# Patient Record
Sex: FEMALE | Race: WHITE | NOT HISPANIC OR LATINO | Employment: FULL TIME | URBAN - METROPOLITAN AREA
[De-identification: names, ages, dates, MRNs, and addresses within clinical notes are randomized per-mention and may not be internally consistent; named-entity substitution may affect disease eponyms.]

---

## 2017-03-01 ENCOUNTER — ALLSCRIPTS OFFICE VISIT (OUTPATIENT)
Dept: OTHER | Facility: OTHER | Age: 47
End: 2017-03-01

## 2017-03-01 DIAGNOSIS — Z12.31 ENCOUNTER FOR SCREENING MAMMOGRAM FOR MALIGNANT NEOPLASM OF BREAST: ICD-10-CM

## 2017-03-08 ENCOUNTER — LAB CONVERSION - ENCOUNTER (OUTPATIENT)
Dept: OTHER | Facility: OTHER | Age: 47
End: 2017-03-08

## 2017-03-08 LAB
ADDITIONAL INFORMATION (HISTORICAL): ABNORMAL
ADEQUACY: (HISTORICAL): ABNORMAL
CYTOTECHNOLOGIST: (HISTORICAL): ABNORMAL
GENERAL CATEGORIZATION (HISTORICAL): ABNORMAL
HPV MRNA E6/E7 (HISTORICAL): DETECTED
INTERPRETATION (HISTORICAL): ABNORMAL
LMP (HISTORICAL): ABNORMAL
PATHOLOGIST (HISTORICAL): ABNORMAL
PREV. BX: (HISTORICAL): ABNORMAL
PREV. PAP (HISTORICAL): ABNORMAL
SOURCE (HISTORICAL): ABNORMAL

## 2017-04-11 ENCOUNTER — ALLSCRIPTS OFFICE VISIT (OUTPATIENT)
Dept: OTHER | Facility: OTHER | Age: 47
End: 2017-04-11

## 2017-04-14 ENCOUNTER — LAB CONVERSION - ENCOUNTER (OUTPATIENT)
Dept: OTHER | Facility: OTHER | Age: 47
End: 2017-04-14

## 2017-04-14 LAB
ADDITIONAL INFORMATION (HISTORICAL): NORMAL
ADDITIONAL INFORMATION (HISTORICAL): NORMAL
DIAGNOSIS (HISTORICAL): NORMAL
GROSS DESCRIPTION (HISTORICAL): NORMAL
GROSS DESCRIPTION (HISTORICAL): NORMAL
PATHOLOGIST (HISTORICAL): NORMAL
PATHOLOGIST (HISTORICAL): NORMAL
PROCEDURE (HISTORICAL): NORMAL
SITE/SOURCE (HISTORICAL): NORMAL
SITE/SOURCE (HISTORICAL): NORMAL
SOURCE (HISTORICAL): NORMAL

## 2017-05-30 DIAGNOSIS — Z01.818 ENCOUNTER FOR OTHER PREPROCEDURAL EXAMINATION: ICD-10-CM

## 2017-06-01 RX ORDER — ZOLPIDEM TARTRATE 6.25 MG/1
6.25 TABLET, FILM COATED, EXTENDED RELEASE ORAL
COMMUNITY
End: 2021-07-06 | Stop reason: ALTCHOICE

## 2017-06-01 RX ORDER — CLONAZEPAM 2 MG/1
2 TABLET ORAL 2 TIMES DAILY PRN
COMMUNITY
End: 2021-10-01 | Stop reason: ALTCHOICE

## 2017-06-12 ENCOUNTER — ALLSCRIPTS OFFICE VISIT (OUTPATIENT)
Dept: OTHER | Facility: OTHER | Age: 47
End: 2017-06-12

## 2017-06-15 ENCOUNTER — ANESTHESIA EVENT (OUTPATIENT)
Dept: PERIOP | Facility: HOSPITAL | Age: 47
End: 2017-06-15
Payer: COMMERCIAL

## 2017-06-16 ENCOUNTER — ANESTHESIA (OUTPATIENT)
Dept: PERIOP | Facility: HOSPITAL | Age: 47
End: 2017-06-16
Payer: COMMERCIAL

## 2017-06-16 ENCOUNTER — HOSPITAL ENCOUNTER (OUTPATIENT)
Facility: HOSPITAL | Age: 47
Setting detail: OUTPATIENT SURGERY
Discharge: HOME | End: 2017-06-16
Attending: OBSTETRICS & GYNECOLOGY | Admitting: OBSTETRICS & GYNECOLOGY
Payer: COMMERCIAL

## 2017-06-16 VITALS
HEIGHT: 66 IN | OXYGEN SATURATION: 99 % | SYSTOLIC BLOOD PRESSURE: 107 MMHG | RESPIRATION RATE: 16 BRPM | BODY MASS INDEX: 27 KG/M2 | DIASTOLIC BLOOD PRESSURE: 66 MMHG | TEMPERATURE: 99 F | WEIGHT: 168 LBS | HEART RATE: 68 BPM

## 2017-06-16 DIAGNOSIS — D06.9 SEVERE CERVICAL DYSPLASIA: ICD-10-CM

## 2017-06-16 LAB
ABO GROUP BLD: NORMAL
APTT PPP: 30 SECONDS (ref 23–35)
BASOPHILS # BLD AUTO: 0.01 THOUSANDS/ΜL (ref 0–0.1)
BASOPHILS NFR BLD AUTO: 0 % (ref 0–1)
BLD GP AB SCN SERPL QL: NEGATIVE
EOSINOPHIL # BLD AUTO: 0.17 THOUSAND/ΜL (ref 0–0.61)
EOSINOPHIL NFR BLD AUTO: 3 % (ref 0–6)
ERYTHROCYTE [DISTWIDTH] IN BLOOD BY AUTOMATED COUNT: 13.5 % (ref 11.6–15.1)
EXT PREGNANCY TEST URINE: NEGATIVE
HCT VFR BLD AUTO: 40 % (ref 34.8–46.1)
HGB BLD-MCNC: 13.6 G/DL (ref 11.5–15.4)
INR PPP: 0.95 (ref 0.86–1.16)
LYMPHOCYTES # BLD AUTO: 1.55 THOUSANDS/ΜL (ref 0.6–4.47)
LYMPHOCYTES NFR BLD AUTO: 26 % (ref 14–44)
MCH RBC QN AUTO: 32 PG (ref 26.8–34.3)
MCHC RBC AUTO-ENTMCNC: 34 G/DL (ref 31.4–37.4)
MCV RBC AUTO: 94 FL (ref 82–98)
MONOCYTES # BLD AUTO: 0.35 THOUSAND/ΜL (ref 0.17–1.22)
MONOCYTES NFR BLD AUTO: 6 % (ref 4–12)
NEUTROPHILS # BLD AUTO: 3.85 THOUSANDS/ΜL (ref 1.85–7.62)
NEUTS SEG NFR BLD AUTO: 65 % (ref 43–75)
NRBC BLD AUTO-RTO: 0 /100 WBCS
PLATELET # BLD AUTO: 275 THOUSANDS/UL (ref 149–390)
PMV BLD AUTO: 8.7 FL (ref 8.9–12.7)
PROTHROMBIN TIME: 12.7 SECONDS (ref 12.1–14.4)
RBC # BLD AUTO: 4.25 MILLION/UL (ref 3.81–5.12)
RH BLD: POSITIVE
SPECIMEN EXPIRATION DATE: NORMAL
WBC # BLD AUTO: 5.94 THOUSAND/UL (ref 4.31–10.16)

## 2017-06-16 PROCEDURE — 88307 TISSUE EXAM BY PATHOLOGIST: CPT | Performed by: OBSTETRICS & GYNECOLOGY

## 2017-06-16 PROCEDURE — 86850 RBC ANTIBODY SCREEN: CPT | Performed by: OBSTETRICS & GYNECOLOGY

## 2017-06-16 PROCEDURE — 86901 BLOOD TYPING SEROLOGIC RH(D): CPT | Performed by: OBSTETRICS & GYNECOLOGY

## 2017-06-16 PROCEDURE — 85730 THROMBOPLASTIN TIME PARTIAL: CPT | Performed by: OBSTETRICS & GYNECOLOGY

## 2017-06-16 PROCEDURE — 81025 URINE PREGNANCY TEST: CPT | Performed by: OBSTETRICS & GYNECOLOGY

## 2017-06-16 PROCEDURE — 88344 IMHCHEM/IMCYTCHM EA MLT ANTB: CPT | Performed by: OBSTETRICS & GYNECOLOGY

## 2017-06-16 PROCEDURE — 85610 PROTHROMBIN TIME: CPT | Performed by: OBSTETRICS & GYNECOLOGY

## 2017-06-16 PROCEDURE — 86900 BLOOD TYPING SEROLOGIC ABO: CPT | Performed by: OBSTETRICS & GYNECOLOGY

## 2017-06-16 PROCEDURE — 85025 COMPLETE CBC W/AUTO DIFF WBC: CPT | Performed by: OBSTETRICS & GYNECOLOGY

## 2017-06-16 RX ORDER — ONDANSETRON 2 MG/ML
INJECTION INTRAMUSCULAR; INTRAVENOUS AS NEEDED
Status: DISCONTINUED | OUTPATIENT
Start: 2017-06-16 | End: 2017-06-16 | Stop reason: SURG

## 2017-06-16 RX ORDER — KETOROLAC TROMETHAMINE 30 MG/ML
INJECTION, SOLUTION INTRAMUSCULAR; INTRAVENOUS AS NEEDED
Status: DISCONTINUED | OUTPATIENT
Start: 2017-06-16 | End: 2017-06-16 | Stop reason: SURG

## 2017-06-16 RX ORDER — METOCLOPRAMIDE HYDROCHLORIDE 5 MG/ML
10 INJECTION INTRAMUSCULAR; INTRAVENOUS ONCE AS NEEDED
Status: DISCONTINUED | OUTPATIENT
Start: 2017-06-16 | End: 2017-06-16 | Stop reason: HOSPADM

## 2017-06-16 RX ORDER — SCOLOPAMINE TRANSDERMAL SYSTEM 1 MG/1
PATCH, EXTENDED RELEASE TRANSDERMAL
Status: COMPLETED
Start: 2017-06-16 | End: 2017-06-16

## 2017-06-16 RX ORDER — ONDANSETRON 2 MG/ML
4 INJECTION INTRAMUSCULAR; INTRAVENOUS ONCE AS NEEDED
Status: DISCONTINUED | OUTPATIENT
Start: 2017-06-16 | End: 2017-06-16 | Stop reason: HOSPADM

## 2017-06-16 RX ORDER — FENTANYL CITRATE/PF 50 MCG/ML
50 SYRINGE (ML) INJECTION
Status: DISCONTINUED | OUTPATIENT
Start: 2017-06-16 | End: 2017-06-16 | Stop reason: HOSPADM

## 2017-06-16 RX ORDER — FENTANYL CITRATE 50 UG/ML
INJECTION, SOLUTION INTRAMUSCULAR; INTRAVENOUS AS NEEDED
Status: DISCONTINUED | OUTPATIENT
Start: 2017-06-16 | End: 2017-06-16 | Stop reason: SURG

## 2017-06-16 RX ORDER — SODIUM CHLORIDE, SODIUM LACTATE, POTASSIUM CHLORIDE, CALCIUM CHLORIDE 600; 310; 30; 20 MG/100ML; MG/100ML; MG/100ML; MG/100ML
125 INJECTION, SOLUTION INTRAVENOUS CONTINUOUS
Status: DISCONTINUED | OUTPATIENT
Start: 2017-06-16 | End: 2017-06-16 | Stop reason: SDUPTHER

## 2017-06-16 RX ORDER — KETOROLAC TROMETHAMINE 30 MG/ML
30 INJECTION, SOLUTION INTRAMUSCULAR; INTRAVENOUS EVERY 6 HOURS PRN
Status: DISCONTINUED | OUTPATIENT
Start: 2017-06-16 | End: 2017-06-16 | Stop reason: HOSPADM

## 2017-06-16 RX ORDER — FERRIC SUBSULFATE 0.21 G/G
LIQUID TOPICAL AS NEEDED
Status: DISCONTINUED | OUTPATIENT
Start: 2017-06-16 | End: 2017-06-16 | Stop reason: HOSPADM

## 2017-06-16 RX ORDER — ONDANSETRON 2 MG/ML
4 INJECTION INTRAMUSCULAR; INTRAVENOUS EVERY 6 HOURS PRN
Status: DISCONTINUED | OUTPATIENT
Start: 2017-06-16 | End: 2017-06-16 | Stop reason: HOSPADM

## 2017-06-16 RX ORDER — SODIUM CHLORIDE, SODIUM LACTATE, POTASSIUM CHLORIDE, CALCIUM CHLORIDE 600; 310; 30; 20 MG/100ML; MG/100ML; MG/100ML; MG/100ML
125 INJECTION, SOLUTION INTRAVENOUS CONTINUOUS
Status: DISCONTINUED | OUTPATIENT
Start: 2017-06-16 | End: 2017-06-16 | Stop reason: HOSPADM

## 2017-06-16 RX ORDER — SCOLOPAMINE TRANSDERMAL SYSTEM 1 MG/1
1 PATCH, EXTENDED RELEASE TRANSDERMAL ONCE
Status: DISCONTINUED | OUTPATIENT
Start: 2017-06-16 | End: 2017-06-16 | Stop reason: HOSPADM

## 2017-06-16 RX ORDER — MIDAZOLAM HYDROCHLORIDE 1 MG/ML
INJECTION INTRAMUSCULAR; INTRAVENOUS AS NEEDED
Status: DISCONTINUED | OUTPATIENT
Start: 2017-06-16 | End: 2017-06-16 | Stop reason: SURG

## 2017-06-16 RX ORDER — IODINE SOLUTION STRONG 5% (LUGOL'S) 5 %
SOLUTION ORAL AS NEEDED
Status: DISCONTINUED | OUTPATIENT
Start: 2017-06-16 | End: 2017-06-16 | Stop reason: HOSPADM

## 2017-06-16 RX ORDER — MEPERIDINE HYDROCHLORIDE 25 MG/ML
12.5 INJECTION INTRAMUSCULAR; INTRAVENOUS; SUBCUTANEOUS ONCE AS NEEDED
Status: DISCONTINUED | OUTPATIENT
Start: 2017-06-16 | End: 2017-06-16 | Stop reason: HOSPADM

## 2017-06-16 RX ORDER — PROPOFOL 10 MG/ML
INJECTION, EMULSION INTRAVENOUS AS NEEDED
Status: DISCONTINUED | OUTPATIENT
Start: 2017-06-16 | End: 2017-06-16 | Stop reason: SURG

## 2017-06-16 RX ORDER — ACETAMINOPHEN 325 MG/1
650 TABLET ORAL EVERY 6 HOURS PRN
Status: DISCONTINUED | OUTPATIENT
Start: 2017-06-16 | End: 2017-06-16 | Stop reason: HOSPADM

## 2017-06-16 RX ORDER — ESTRADIOL 0.5 MG/1
0.5 TABLET ORAL DAILY
COMMUNITY
End: 2019-06-27 | Stop reason: SDUPTHER

## 2017-06-16 RX ORDER — LIDOCAINE HYDROCHLORIDE 10 MG/ML
INJECTION, SOLUTION EPIDURAL; INFILTRATION; INTRACAUDAL; PERINEURAL AS NEEDED
Status: DISCONTINUED | OUTPATIENT
Start: 2017-06-16 | End: 2017-06-16 | Stop reason: SURG

## 2017-06-16 RX ORDER — KETOROLAC TROMETHAMINE 10 MG/1
10 TABLET, FILM COATED ORAL EVERY 6 HOURS PRN
Status: DISCONTINUED | OUTPATIENT
Start: 2017-06-16 | End: 2017-06-16 | Stop reason: HOSPADM

## 2017-06-16 RX ADMIN — FENTANYL CITRATE 50 MCG: 50 INJECTION INTRAMUSCULAR; INTRAVENOUS at 09:18

## 2017-06-16 RX ADMIN — MIDAZOLAM HYDROCHLORIDE 2 MG: 1 INJECTION, SOLUTION INTRAMUSCULAR; INTRAVENOUS at 09:14

## 2017-06-16 RX ADMIN — LIDOCAINE HYDROCHLORIDE 30 MG: 10 INJECTION, SOLUTION EPIDURAL; INFILTRATION; INTRACAUDAL; PERINEURAL at 08:37

## 2017-06-16 RX ADMIN — DEXAMETHASONE SODIUM PHOSPHATE 4 MG: 10 INJECTION INTRAMUSCULAR; INTRAVENOUS at 08:44

## 2017-06-16 RX ADMIN — FENTANYL CITRATE 50 MCG: 50 INJECTION, SOLUTION INTRAMUSCULAR; INTRAVENOUS at 09:08

## 2017-06-16 RX ADMIN — PROPOFOL 200 MG: 10 INJECTION, EMULSION INTRAVENOUS at 08:37

## 2017-06-16 RX ADMIN — KETOROLAC TROMETHAMINE 10 MG: 10 TABLET, FILM COATED ORAL at 10:29

## 2017-06-16 RX ADMIN — FENTANYL CITRATE 50 MCG: 50 INJECTION INTRAMUSCULAR; INTRAVENOUS at 09:27

## 2017-06-16 RX ADMIN — MIDAZOLAM HYDROCHLORIDE 2 MG: 1 INJECTION, SOLUTION INTRAMUSCULAR; INTRAVENOUS at 08:35

## 2017-06-16 RX ADMIN — SCOPOLAMINE 1 PATCH: 1 PATCH, EXTENDED RELEASE TRANSDERMAL at 08:04

## 2017-06-16 RX ADMIN — FENTANYL CITRATE 100 MCG: 50 INJECTION, SOLUTION INTRAMUSCULAR; INTRAVENOUS at 09:12

## 2017-06-16 RX ADMIN — SODIUM CHLORIDE, SODIUM LACTATE, POTASSIUM CHLORIDE, AND CALCIUM CHLORIDE 125 ML/HR: .6; .31; .03; .02 INJECTION, SOLUTION INTRAVENOUS at 07:43

## 2017-06-16 RX ADMIN — KETOROLAC TROMETHAMINE 30 MG: 30 INJECTION, SOLUTION INTRAMUSCULAR at 08:44

## 2017-06-16 RX ADMIN — FENTANYL CITRATE 25 MCG: 50 INJECTION, SOLUTION INTRAMUSCULAR; INTRAVENOUS at 08:45

## 2017-06-16 RX ADMIN — FENTANYL CITRATE 25 MCG: 50 INJECTION, SOLUTION INTRAMUSCULAR; INTRAVENOUS at 08:42

## 2017-06-16 RX ADMIN — SCOPALAMINE 1 PATCH: 1 PATCH, EXTENDED RELEASE TRANSDERMAL at 08:07

## 2017-06-16 RX ADMIN — FENTANYL CITRATE 50 MCG: 50 INJECTION INTRAMUSCULAR; INTRAVENOUS at 09:32

## 2017-06-16 RX ADMIN — SODIUM CHLORIDE, SODIUM LACTATE, POTASSIUM CHLORIDE, AND CALCIUM CHLORIDE 125 ML/HR: .6; .31; .03; .02 INJECTION, SOLUTION INTRAVENOUS at 09:27

## 2017-06-16 RX ADMIN — ONDANSETRON 4 MG: 2 INJECTION INTRAMUSCULAR; INTRAVENOUS at 08:44

## 2017-07-10 ENCOUNTER — ALLSCRIPTS OFFICE VISIT (OUTPATIENT)
Dept: OTHER | Facility: OTHER | Age: 47
End: 2017-07-10

## 2017-07-31 ENCOUNTER — ALLSCRIPTS OFFICE VISIT (OUTPATIENT)
Dept: OTHER | Facility: OTHER | Age: 47
End: 2017-07-31

## 2017-11-07 ENCOUNTER — ALLSCRIPTS OFFICE VISIT (OUTPATIENT)
Dept: OTHER | Facility: OTHER | Age: 47
End: 2017-11-07

## 2017-11-08 NOTE — PROGRESS NOTES
Assessment  1  High grade squamous intraepithelial cervical dysplasia (795 04) (I90 [id number])    Discussion/Summary  Patient's Capacity to Self-Care: Patient is able to Self-Care  Chief Complaint  Chief Complaint Free Text Note Form: Repeat pap smear      History of Present Illness  HPI: This is a 50-year-old white female who had a history of a high-grade squamous intraepithelial lesion  She underwent a colon biopsy in June of this year  She now returns for repeat Pap smear  She is doing with the birth control pill  A Pap smear was performed  She will be informed results of the Pap smear returns  If this Pap smear is normal we will see her back in 6 months for yearly exam and repeat Pap smear  Active Problems  1  Abnormal Pap smear of cervix (795 00) (R87 619)   2  Encounter for gynecological examination (V72 31) (Z01 419)   3  Endocervical polyp (622 7) (N84 1)   4  High grade squamous intraepithelial cervical dysplasia (795 04) (R87 613)   5  Perimenopause (627 2) (N95 1)   6  Post-op pain (338 18) (G89 18)   7  Preoperative testing (V72 84) (Z01 818)   8  Visit for screening mammogram (V76 12) (Z12 31)    Past Medical History  1  History of Anxiety (300 00) (F41 9)    Surgical History  1  History of Exploratory Laparoscopy   2  History of Neck Surgery    Family History  Mother    1  No pertinent family history  Father    2  No pertinent family history    Social History   · Current every day smoker (305 1) (F17 200)    Current Meds   1  Ambien 10 MG Oral Tablet; Therapy: (Recorded:52Qkr3034) to Recorded   2  Ketorolac Tromethamine 10 MG Oral Tablet; 1 tab q 4-6 hrs prn; Therapy: [id number] to (Last Rx:15Jun2017) Ordered   3  KlonoPIN 2 MG Oral Tablet; Therapy: (Recorded:50Jby9814) to Recorded   4  Lo Loestrin Fe 1 MG-10 MCG / 10 MCG Oral Tablet; Take 1 tablet daily  Requested for:   [id number]; Last Rx:76Vsa1653 Ordered    Allergies  1   Morphine Derivatives    Vitals  Vital Signs    Recorded: 39IEV2430 04: 59UG   Systolic 410   Diastolic 80   Height 5 ft 6 in   Weight 175 lb 2 08 oz   BMI Calculated 28 27   BSA Calculated 1 9   LMP 24-Dec-2016     Signatures   Electronically signed by : VESTA Hook ; Nov 7 2017  5:00PM EST                       (Author)

## 2017-11-10 ENCOUNTER — LAB CONVERSION - ENCOUNTER (OUTPATIENT)
Dept: OTHER | Facility: OTHER | Age: 47
End: 2017-11-10

## 2017-11-10 LAB
ADDITIONAL INFORMATION (HISTORICAL): NORMAL
ADEQUACY: (HISTORICAL): NORMAL
COMMENT (HISTORICAL): NORMAL
CYTOTECHNOLOGIST: (HISTORICAL): NORMAL
HPV MRNA E6/E7 (HISTORICAL): NOT DETECTED
INTERPRETATION (HISTORICAL): NORMAL
LMP (HISTORICAL): NORMAL
PREV. BX: (HISTORICAL): NORMAL
PREV. PAP (HISTORICAL): NORMAL
REVIEWED BY (HISTORICAL): NORMAL
SOURCE (HISTORICAL): NORMAL

## 2018-01-12 VITALS
HEIGHT: 66 IN | SYSTOLIC BLOOD PRESSURE: 122 MMHG | DIASTOLIC BLOOD PRESSURE: 80 MMHG | WEIGHT: 175.13 LBS | BODY MASS INDEX: 28.15 KG/M2

## 2018-01-14 VITALS
WEIGHT: 165.5 LBS | BODY MASS INDEX: 26.6 KG/M2 | DIASTOLIC BLOOD PRESSURE: 72 MMHG | SYSTOLIC BLOOD PRESSURE: 104 MMHG | HEIGHT: 66 IN

## 2018-01-14 VITALS
HEIGHT: 66 IN | DIASTOLIC BLOOD PRESSURE: 70 MMHG | BODY MASS INDEX: 26.76 KG/M2 | SYSTOLIC BLOOD PRESSURE: 102 MMHG | WEIGHT: 166.5 LBS

## 2018-01-14 VITALS
BODY MASS INDEX: 27.32 KG/M2 | SYSTOLIC BLOOD PRESSURE: 112 MMHG | WEIGHT: 170 LBS | DIASTOLIC BLOOD PRESSURE: 70 MMHG | HEIGHT: 66 IN

## 2018-01-14 VITALS
SYSTOLIC BLOOD PRESSURE: 118 MMHG | DIASTOLIC BLOOD PRESSURE: 80 MMHG | WEIGHT: 169.13 LBS | HEIGHT: 66 IN | BODY MASS INDEX: 27.18 KG/M2

## 2018-01-14 VITALS
SYSTOLIC BLOOD PRESSURE: 110 MMHG | WEIGHT: 168 LBS | BODY MASS INDEX: 27 KG/M2 | HEIGHT: 66 IN | DIASTOLIC BLOOD PRESSURE: 80 MMHG

## 2018-02-15 DIAGNOSIS — Z30.41 ENCOUNTER FOR SURVEILLANCE OF CONTRACEPTIVE PILLS: Primary | ICD-10-CM

## 2018-02-20 ENCOUNTER — TELEPHONE (OUTPATIENT)
Dept: OBGYN CLINIC | Facility: CLINIC | Age: 48
End: 2018-02-20

## 2018-02-20 NOTE — TELEPHONE ENCOUNTER
Pt informed presc for lo loestrin was prev escribed on 2/15/18    Pt scheduled yearly appt for 5/2018

## 2018-04-05 ENCOUNTER — TRANSCRIBE ORDERS (OUTPATIENT)
Dept: NEUROSURGERY | Facility: CLINIC | Age: 48
End: 2018-04-05

## 2018-04-05 DIAGNOSIS — M54.2 NECK PAIN: Primary | ICD-10-CM

## 2018-04-06 ENCOUNTER — OFFICE VISIT (OUTPATIENT)
Dept: NEUROSURGERY | Facility: CLINIC | Age: 48
End: 2018-04-06
Payer: COMMERCIAL

## 2018-04-06 VITALS
WEIGHT: 170.8 LBS | HEIGHT: 66 IN | RESPIRATION RATE: 20 BRPM | SYSTOLIC BLOOD PRESSURE: 111 MMHG | HEART RATE: 84 BPM | TEMPERATURE: 97.2 F | BODY MASS INDEX: 27.45 KG/M2 | DIASTOLIC BLOOD PRESSURE: 61 MMHG

## 2018-04-06 DIAGNOSIS — M79.601 RIGHT ARM PAIN: ICD-10-CM

## 2018-04-06 DIAGNOSIS — M62.838 TRAPEZIUS MUSCLE SPASM: ICD-10-CM

## 2018-04-06 DIAGNOSIS — M54.2 CERVICALGIA: Primary | ICD-10-CM

## 2018-04-06 DIAGNOSIS — M54.2 NECK PAIN: ICD-10-CM

## 2018-04-06 PROCEDURE — 99243 OFF/OP CNSLTJ NEW/EST LOW 30: CPT | Performed by: PHYSICIAN ASSISTANT

## 2018-04-06 RX ORDER — ALPRAZOLAM 0.5 MG/1
0.5 TABLET ORAL
Qty: 2 TABLET | Refills: 0 | Status: SHIPPED | OUTPATIENT
Start: 2018-04-06 | End: 2021-09-29 | Stop reason: ALTCHOICE

## 2018-04-06 RX ORDER — VARENICLINE TARTRATE 1 MG/1
1 TABLET, FILM COATED ORAL DAILY
Refills: 0 | COMMUNITY
Start: 2018-03-29 | End: 2021-10-01 | Stop reason: ALTCHOICE

## 2018-04-06 NOTE — PROGRESS NOTES
Assessment/Plan:    Very pleasant 60-year-old female, presents with complaint of neck pain, right arm pain, numbness and tingling of both arms  There is no recent imaging available for review  She reports the right arm pain is new in the last 2 weeks  She may specific note that her numbness and tingling symptoms of both her arms were only present when she raises her arms to apply makeup  When she per arms back down her symptoms resolved  She has prior history of C5-C6 fusion  Clinically there is no motor or sensory deficit at this time in the upper lower extremities  Reflexes are intact and symmetrical for the upper extremities, Ree sign is negative  Spurling sign is also negative  She has diffuse tenderness to palpation about the posterior neck, trapezius, and posterior shoulders  At this time she reports she takes no specific medications for the symptoms  MRI cervical spine with contrast is requested to assess for etiology of radicular symptoms particularly her right arm  Course of physical therapy is also advised a consult has been requested  Further follow-up is planned post imaging  These findings, impressions and recommendations are reviewed in great detail with the patient, he expressed understanding and agreement, his questions were answered completely and to his satisfaction  Follow up has been scheduled  Diagnoses and all orders for this visit:    Cervicalgia  -     MRI cervical spine with and without contrast; Future  -     Ambulatory referral to Physical Therapy; Future  -     Ambulatory referral to Pain Management; Future  -     ALPRAZolam (XANAX) 0 5 mg tablet;  Take 1 tablet (0 5 mg total) by mouth 30 min pre-procedure Take 1 tablet 2 hours prior to procedure, may repeat x 1, 30 min prior to procedure      Neck pain  -     Ambulatory referral to Neurosurgery  -     MRI cervical spine with and without contrast; Future  -     Ambulatory referral to Physical Therapy; Future  -     Ambulatory referral to Pain Management; Future  -     ALPRAZolam (XANAX) 0 5 mg tablet; Take 1 tablet (0 5 mg total) by mouth 30 min pre-procedure Take 1 tablet 2 hours prior to procedure, may repeat x 1, 30 min prior to procedure      Right arm pain  -     MRI cervical spine with and without contrast; Future  -     Ambulatory referral to Physical Therapy; Future  -     Ambulatory referral to Pain Management; Future  -     ALPRAZolam (XANAX) 0 5 mg tablet; Take 1 tablet (0 5 mg total) by mouth 30 min pre-procedure Take 1 tablet 2 hours prior to procedure, may repeat x 1, 30 min prior to procedure    Trapezius muscle spasm   -     MRI cervical spine with and without contrast; Future   -   Ambulatory referral to Physical Therapy; Future              Subjective:      Patient ID: Tania Reinoso is a 53 y o  female  Pleasant 52-year-old female, presents with complaints as noted  She has prior history of surgery, anterior C5-C6 diskectomy, partial corpectomy, decompression nerve the spinal canal, anterior C5-C6 allograft bone fusion and anterior C5-C6 plate and screw fixation with Vinod instrument  This was performed by Dr Evans, 8/26/09    The patient reports following this procedure her upper extremity symptoms, numbness, tingling and pain as well as posterior neck pain were completely resolved  She reports she remains essentially see symptom-free at the last 6 months, and in the last 2 weeks she had onset of right arm pain  She reports she had a course of physical therapy following her prior surgery in 2009, and has performed the physical therapy regimen taught at that time on a regular basis until the present  She reports no specific traumatic event is associated with the onset of the symptoms      She make special note that when she applies her makeup in the morning with her arms raised in the air she has numbness and tingling of both her hands upon returning them to the neutral position this resolves  She otherwise denies gait or balance disturbance, motor or sensory difficulty in the lower extremities, bowel or bladder incontinence, urinary urgency or frequency, incomplete emptying  She denies difficulty with knife and fork, she denies difficulty with dropping items, she denies change in handwriting  She is a current smoker, she reports she months approximately 5 cigarettes per day she has prior 1 pack per day history for more than 20 years  The following portions of the patient's history were reviewed and updated as appropriate: allergies, current medications, past family history, past medical history, past social history and past surgical history  Review of Systems   HENT: Negative  Eyes: Negative  Respiratory: Negative  Cardiovascular: Negative  Gastrointestinal: Negative  Endocrine: Negative  Genitourinary: Negative  Musculoskeletal: Positive for back pain (whole, mostly lower), neck pain and neck stiffness  Negative for arthralgias, gait problem, joint swelling and myalgias  Skin: Negative  Allergic/Immunologic: Negative  Neurological: Positive for weakness (right arm) and numbness (whole right arm)  Negative for dizziness, tremors, seizures, syncope, facial asymmetry, speech difficulty, light-headedness and headaches  Hematological: Negative  Psychiatric/Behavioral: Negative  Objective:    Physical Exam   Constitutional: She is oriented to person, place, and time  She appears well-developed and well-nourished  HENT:   Head: Normocephalic and atraumatic  Neck: Normal range of motion  Cardiovascular: Normal rate, regular rhythm and normal heart sounds  Pulmonary/Chest: Effort normal and breath sounds normal    Musculoskeletal: Normal range of motion  Neurological: She is alert and oriented to person, place, and time   Gait normal    Reflex Scores:       Tricep reflexes are 2+ on the right side and 2+ on the left side  Bicep reflexes are 2+ on the right side and 2+ on the left side  Patellar reflexes are 2+ on the right side and 2+ on the left side  Skin: Skin is warm and dry  Psychiatric: She has a normal mood and affect  Neurologic Exam     Mental Status   Oriented to person, place, and time     Level of consciousness: alert    Motor Exam   Muscle bulk: normal  Overall muscle tone: normal    Strength   Right neck flexion: 5/5  Left neck flexion: 5/5  Right neck extension: 5/5  Left neck extension: 5/5  Right biceps: 5/5  Left biceps: 5/5  Right triceps: 5/5  Left triceps: 5/5  Right wrist flexion: 5/5  Left wrist flexion: 5/5  Right wrist extension: 5/5  Left wrist extension: 5/5  Right interossei: 5/5  Left interossei: 5/5    Sensory Exam   Light touch normal      Gait, Coordination, and Reflexes     Gait  Gait: normal    Reflexes   Right biceps: 2+  Left biceps: 2+  Right triceps: 2+  Left triceps: 2+  Right patellar: 2+  Left patellar: 2+  Right Vilchis: absent  Left Vilchis: absent  Right ankle clonus: absent  Left ankle clonus: absent

## 2018-04-06 NOTE — LETTER
April 6, 2018     Roxy Barrios DO  [address] [facility]  [address] [address]    Patient: Dilshad Espinoza   YOB: 1970   Date of Visit: 4/6/2018       Dear Dr Marly Chi: Thank you for referring Dilshad Espinoza to me for evaluation  Below are my notes for this consultation  If you have questions, please do not hesitate to call me  I look forward to following your patient along with you  Sincerely,        Imani Byrnes PA-C        CC: No Recipients  Imani Byrnes PA-C  4/6/2018  3:07 PM  Sign at close encounter  Assessment/Plan:    Very pleasant 79-year-old female, presents with complaint of neck pain, right arm pain, numbness and tingling of both arms  There is no recent imaging available for review  She reports the right arm pain is new in the last 2 weeks  She may specific note that her numbness and tingling symptoms of both her arms were only present when she raises her arms to apply makeup  When she per arms back down her symptoms resolved  She has prior history of C5-C6 fusion  Clinically there is no motor or sensory deficit at this time in the upper lower extremities  Reflexes are intact and symmetrical for the upper extremities, Ree sign is negative  Spurling sign is also negative  She has diffuse tenderness to palpation about the posterior neck, trapezius, and posterior shoulders  At this time she reports she takes no specific medications for the symptoms  MRI cervical spine with contrast is requested to assess for etiology of radicular symptoms particularly her right arm  Course of physical therapy is also advised a consult has been requested  Further follow-up is planned post imaging  These findings, impressions and recommendations are reviewed in great detail with the patient, he expressed understanding and agreement, his questions were answered completely and to his satisfaction  Follow up has been scheduled  Diagnoses and all orders for this visit:    Cervicalgia  -     MRI cervical spine with and without contrast; Future  -     Ambulatory referral to Physical Therapy; Future  -     Ambulatory referral to Pain Management; Future  -     ALPRAZolam (XANAX) 0 5 mg tablet; Take 1 tablet (0 5 mg total) by mouth 30 min pre-procedure Take 1 tablet 2 hours prior to procedure, may repeat x 1, 30 min prior to procedure      Neck pain  -     Ambulatory referral to Neurosurgery  -     MRI cervical spine with and without contrast; Future  -     Ambulatory referral to Physical Therapy; Future  -     Ambulatory referral to Pain Management; Future  -     ALPRAZolam (XANAX) 0 5 mg tablet; Take 1 tablet (0 5 mg total) by mouth 30 min pre-procedure Take 1 tablet 2 hours prior to procedure, may repeat x 1, 30 min prior to procedure      Right arm pain  -     MRI cervical spine with and without contrast; Future  -     Ambulatory referral to Physical Therapy; Future  -     Ambulatory referral to Pain Management; Future  -     ALPRAZolam (XANAX) 0 5 mg tablet; Take 1 tablet (0 5 mg total) by mouth 30 min pre-procedure Take 1 tablet 2 hours prior to procedure, may repeat x 1, 30 min prior to procedure    Trapezius muscle spasm   -     MRI cervical spine with and without contrast; Future   -   Ambulatory referral to Physical Therapy; Future              Subjective:      Patient ID: Arthurine Peabody is a 53 y o  female  Pleasant 71-year-old female, presents with complaints as noted  She has prior history of surgery, anterior C5-C6 diskectomy, partial corpectomy, decompression nerve the spinal canal, anterior C5-C6 allograft bone fusion and anterior C5-C6 plate and screw fixation with Vinod instrument  This was performed by Dr Evans, 8/26/09    The patient reports following this procedure her upper extremity symptoms, numbness, tingling and pain as well as posterior neck pain were completely resolved      She reports she remains essentially see symptom-free at the last 6 months, and in the last 2 weeks she had onset of right arm pain  She reports she had a course of physical therapy following her prior surgery in 2009, and has performed the physical therapy regimen taught at that time on a regular basis until the present  She reports no specific traumatic event is associated with the onset of the symptoms  She make special note that when she applies her makeup in the morning with her arms raised in the air she has numbness and tingling of both her hands upon returning them to the neutral position this resolves  She otherwise denies gait or balance disturbance, motor or sensory difficulty in the lower extremities, bowel or bladder incontinence, urinary urgency or frequency, incomplete emptying  She denies difficulty with knife and fork, she denies difficulty with dropping items, she denies change in handwriting  She is a current smoker, she reports she months approximately 5 cigarettes per day she has prior 1 pack per day history for more than 20 years  The following portions of the patient's history were reviewed and updated as appropriate: allergies, current medications, past family history, past medical history, past social history and past surgical history  Review of Systems   HENT: Negative  Eyes: Negative  Respiratory: Negative  Cardiovascular: Negative  Gastrointestinal: Negative  Endocrine: Negative  Genitourinary: Negative  Musculoskeletal: Positive for back pain (whole, mostly lower), neck pain and neck stiffness  Negative for arthralgias, gait problem, joint swelling and myalgias  Skin: Negative  Allergic/Immunologic: Negative  Neurological: Positive for weakness (right arm) and numbness (whole right arm)  Negative for dizziness, tremors, seizures, syncope, facial asymmetry, speech difficulty, light-headedness and headaches  Hematological: Negative  Psychiatric/Behavioral: Negative  Objective:    Physical Exam   Constitutional: She is oriented to person, place, and time  She appears well-developed and well-nourished  HENT:   Head: Normocephalic and atraumatic  Neck: Normal range of motion  Cardiovascular: Normal rate, regular rhythm and normal heart sounds  Pulmonary/Chest: Effort normal and breath sounds normal    Musculoskeletal: Normal range of motion  Neurological: She is alert and oriented to person, place, and time  Gait normal    Reflex Scores:       Tricep reflexes are 2+ on the right side and 2+ on the left side  Bicep reflexes are 2+ on the right side and 2+ on the left side  Patellar reflexes are 2+ on the right side and 2+ on the left side  Skin: Skin is warm and dry  Psychiatric: She has a normal mood and affect  Neurologic Exam     Mental Status   Oriented to person, place, and time     Level of consciousness: alert    Motor Exam   Muscle bulk: normal  Overall muscle tone: normal    Strength   Right neck flexion: 5/5  Left neck flexion: 5/5  Right neck extension: 5/5  Left neck extension: 5/5  Right biceps: 5/5  Left biceps: 5/5  Right triceps: 5/5  Left triceps: 5/5  Right wrist flexion: 5/5  Left wrist flexion: 5/5  Right wrist extension: 5/5  Left wrist extension: 5/5  Right interossei: 5/5  Left interossei: 5/5    Sensory Exam   Light touch normal      Gait, Coordination, and Reflexes     Gait  Gait: normal    Reflexes   Right biceps: 2+  Left biceps: 2+  Right triceps: 2+  Left triceps: 2+  Right patellar: 2+  Left patellar: 2+  Right Vilchis: absent  Left Vilchis: absent  Right ankle clonus: absent  Left ankle clonus: absent

## 2018-04-06 NOTE — PATIENT INSTRUCTIONS
Continue with current exercise regimen relative to her cervical spine  Proceed for physical therapy per request     Further follow-up is planned post imaging  Proceed for consultation with pain management

## 2018-04-12 ENCOUNTER — TELEPHONE (OUTPATIENT)
Dept: PAIN MEDICINE | Facility: MEDICAL CENTER | Age: 48
End: 2018-04-12

## 2018-04-16 ENCOUNTER — DOCUMENTATION (OUTPATIENT)
Dept: NEUROSURGERY | Facility: CLINIC | Age: 48
End: 2018-04-16

## 2018-04-16 NOTE — PROGRESS NOTES
04/16/2018 Peer to Peer completed on 4/13/2018 pt was advised of this 04/13/2018 , mri was cx pt advised she will be following up with [facility] she had apt with them already and states they will take care of her mri   Ed did peer to peer   With Dr Sandra Sorensen     MRI C spine is Denied,     Pt will need to completer a course of PT without improvement, or Chiropractic regiment without improvement, or a physician directed exercise program, in uninsured patients, and which also may include pain management with exercise regimen without improvement within the last 6 months

## 2018-06-11 ENCOUNTER — ANNUAL EXAM (OUTPATIENT)
Dept: OBGYN CLINIC | Facility: CLINIC | Age: 48
End: 2018-06-11
Payer: COMMERCIAL

## 2018-06-11 VITALS
BODY MASS INDEX: 27.58 KG/M2 | WEIGHT: 171.6 LBS | HEIGHT: 66 IN | DIASTOLIC BLOOD PRESSURE: 74 MMHG | SYSTOLIC BLOOD PRESSURE: 110 MMHG

## 2018-06-11 DIAGNOSIS — Z01.419 WOMEN'S ANNUAL ROUTINE GYNECOLOGICAL EXAMINATION: Primary | ICD-10-CM

## 2018-06-11 PROCEDURE — S0612 ANNUAL GYNECOLOGICAL EXAMINA: HCPCS | Performed by: OBSTETRICS & GYNECOLOGY

## 2018-06-11 RX ORDER — OMEPRAZOLE 40 MG/1
CAPSULE, DELAYED RELEASE ORAL
Refills: 5 | COMMUNITY
Start: 2018-05-21 | End: 2021-10-01 | Stop reason: ALTCHOICE

## 2018-06-11 NOTE — PROGRESS NOTES
This is a 60-year-old white female,  4 para 2 with 2 prior vaginal deliveries  Current method of contraception includes vasectomy  Menstrual cycles are irregular  She has a history of severe dysplasia of the cervix  She underwent a  Cone bx of the cervix last year  With clear margins  She had a normal Pap smear in 2017  She for Pap smear today  She denies any other major gynecological  GI complaint  No problem with intimacy  Medications reviewed  She did have recent neck surgery for degenerative disc disease  She is doing better  The patient is still taking half a birth control pill every day for menopause symptoms  Asked her to stop that in light of her smoking  NC of menopause is truly present  She will use over-the-counter preparations if hot flashes and night sweats with her  She denies any  GI complaint patient with make arrangements for mammogram   There are no new major family illnesses report  She is happy with her weight  Review of systems positive for menopause symptoms      Surgical history significant for spotting surgery and neck surgery x2 also cone biopsy for severe dysplasia of the cervix  ,  bilateral breast augmentation      Medical history reviewed  She does have a history of reflux, insomnia  She is currently taking Chantix to stop smoking  Family history is noncontributory    Social history positive for tobacco    Positive for social alcohol        Physical exam this is a well-developed well-nourished white female acute distress heart lung exam within normal limits breast exam symmetrical no masses within normal limits  Abdominal exam softer no masses positive bowel sounds    , there is no rebound  Pelvic examination the external genitalia normal limits vagina is clean there is evidence of prior cone biopsy  A Pap smear was performed  Uterus is anterior normal size is no cervical motion tenderness    The adnexa clear bilaterally  Impression stable gyn examination  History of severe dysplasia of the cervix  If this Pap smear is normal we will go to once a year screening  She make arrangements for mammogram   A refill of birth control pill will not be authorized at this time because of her smoking a problem with menopause  She will keep me informed of menopause symptoms  She should return my office in 1 year

## 2018-06-11 NOTE — PATIENT INSTRUCTIONS
History of severe dysplasia if this Pap smear is normal we will go to once a year screening  She has stopped all hormone replacement therapy including but control pill and Estrace  Watch for signs symptoms of menopause  Return my office in 1 year for repeat Pap smear yearly exam   Get mammograms directed

## 2018-06-13 LAB
CLINICAL INFO: NORMAL
CYTO CVX: NORMAL
CYTOLOGY CMNT CVX/VAG CYTO-IMP: NORMAL
DATE PREVIOUS BX: NORMAL
HPV E6+E7 MRNA CVX QL NAA+PROBE: NOT DETECTED
LMP START DATE: NORMAL
SL AMB PREV. PAP:: NORMAL
SPECIMEN SOURCE CVX/VAG CYTO: NORMAL

## 2018-11-12 ENCOUNTER — APPOINTMENT (OUTPATIENT)
Dept: LAB | Facility: HOSPITAL | Age: 48
End: 2018-11-12
Attending: INTERNAL MEDICINE
Payer: COMMERCIAL

## 2018-11-12 ENCOUNTER — TRANSCRIBE ORDERS (OUTPATIENT)
Dept: LAB | Facility: HOSPITAL | Age: 48
End: 2018-11-12

## 2018-11-12 DIAGNOSIS — E78.2 MIXED HYPERLIPIDEMIA: ICD-10-CM

## 2018-11-12 DIAGNOSIS — D50.8 OTHER IRON DEFICIENCY ANEMIA: ICD-10-CM

## 2018-11-12 DIAGNOSIS — I10 ESSENTIAL HYPERTENSION, MALIGNANT: ICD-10-CM

## 2018-11-12 DIAGNOSIS — I10 ESSENTIAL HYPERTENSION, MALIGNANT: Primary | ICD-10-CM

## 2018-11-12 DIAGNOSIS — R35.0 URINARY FREQUENCY: ICD-10-CM

## 2018-11-12 LAB
ALBUMIN SERPL BCP-MCNC: 4 G/DL (ref 3.5–5)
ALP SERPL-CCNC: 46 U/L (ref 46–116)
ALT SERPL W P-5'-P-CCNC: 23 U/L (ref 12–78)
ANION GAP SERPL CALCULATED.3IONS-SCNC: 5 MMOL/L (ref 4–13)
AST SERPL W P-5'-P-CCNC: 17 U/L (ref 5–45)
BACTERIA UR QL AUTO: ABNORMAL /HPF
BASOPHILS # BLD AUTO: 0.02 THOUSANDS/ΜL (ref 0–0.1)
BASOPHILS NFR BLD AUTO: 0 % (ref 0–1)
BILIRUB SERPL-MCNC: 0.27 MG/DL (ref 0.2–1)
BILIRUB UR QL STRIP: NEGATIVE
BUN SERPL-MCNC: 16 MG/DL (ref 5–25)
CALCIUM SERPL-MCNC: 8.7 MG/DL (ref 8.3–10.1)
CHLORIDE SERPL-SCNC: 107 MMOL/L (ref 100–108)
CHOLEST SERPL-MCNC: 237 MG/DL (ref 50–200)
CLARITY UR: ABNORMAL
CO2 SERPL-SCNC: 25 MMOL/L (ref 21–32)
COLOR UR: YELLOW
CREAT SERPL-MCNC: 0.9 MG/DL (ref 0.6–1.3)
EOSINOPHIL # BLD AUTO: 0.14 THOUSAND/ΜL (ref 0–0.61)
EOSINOPHIL NFR BLD AUTO: 2 % (ref 0–6)
ERYTHROCYTE [DISTWIDTH] IN BLOOD BY AUTOMATED COUNT: 12.7 % (ref 11.6–15.1)
GFR SERPL CREATININE-BSD FRML MDRD: 76 ML/MIN/1.73SQ M
GLUCOSE P FAST SERPL-MCNC: 84 MG/DL (ref 65–99)
GLUCOSE UR STRIP-MCNC: NEGATIVE MG/DL
HCT VFR BLD AUTO: 43.9 % (ref 34.8–46.1)
HDLC SERPL-MCNC: 77 MG/DL (ref 40–60)
HGB BLD-MCNC: 14 G/DL (ref 11.5–15.4)
HGB UR QL STRIP.AUTO: ABNORMAL
HYALINE CASTS #/AREA URNS LPF: ABNORMAL /LPF
IMM GRANULOCYTES # BLD AUTO: 0.02 THOUSAND/UL (ref 0–0.2)
IMM GRANULOCYTES NFR BLD AUTO: 0 % (ref 0–2)
KETONES UR STRIP-MCNC: NEGATIVE MG/DL
LDLC SERPL CALC-MCNC: 129 MG/DL (ref 0–100)
LEUKOCYTE ESTERASE UR QL STRIP: NEGATIVE
LYMPHOCYTES # BLD AUTO: 1.7 THOUSANDS/ΜL (ref 0.6–4.47)
LYMPHOCYTES NFR BLD AUTO: 28 % (ref 14–44)
MCH RBC QN AUTO: 31.2 PG (ref 26.8–34.3)
MCHC RBC AUTO-ENTMCNC: 31.9 G/DL (ref 31.4–37.4)
MCV RBC AUTO: 98 FL (ref 82–98)
MONOCYTES # BLD AUTO: 0.34 THOUSAND/ΜL (ref 0.17–1.22)
MONOCYTES NFR BLD AUTO: 6 % (ref 4–12)
NEUTROPHILS # BLD AUTO: 3.94 THOUSANDS/ΜL (ref 1.85–7.62)
NEUTS SEG NFR BLD AUTO: 64 % (ref 43–75)
NITRITE UR QL STRIP: NEGATIVE
NON-SQ EPI CELLS URNS QL MICRO: ABNORMAL /HPF
NRBC BLD AUTO-RTO: 0 /100 WBCS
PH UR STRIP.AUTO: 6 [PH] (ref 4.5–8)
PLATELET # BLD AUTO: 387 THOUSANDS/UL (ref 149–390)
PMV BLD AUTO: 8.7 FL (ref 8.9–12.7)
POTASSIUM SERPL-SCNC: 4.3 MMOL/L (ref 3.5–5.3)
PROT SERPL-MCNC: 7.5 G/DL (ref 6.4–8.2)
PROT UR STRIP-MCNC: NEGATIVE MG/DL
RBC # BLD AUTO: 4.49 MILLION/UL (ref 3.81–5.12)
RBC #/AREA URNS AUTO: ABNORMAL /HPF
SODIUM SERPL-SCNC: 137 MMOL/L (ref 136–145)
SP GR UR STRIP.AUTO: 1.03 (ref 1–1.03)
TRIGL SERPL-MCNC: 153 MG/DL
TSH SERPL DL<=0.05 MIU/L-ACNC: 0.49 UIU/ML (ref 0.36–3.74)
UROBILINOGEN UR QL STRIP.AUTO: 0.2 E.U./DL
WBC # BLD AUTO: 6.16 THOUSAND/UL (ref 4.31–10.16)
WBC #/AREA URNS AUTO: ABNORMAL /HPF

## 2018-11-12 PROCEDURE — 84443 ASSAY THYROID STIM HORMONE: CPT

## 2018-11-12 PROCEDURE — 36415 COLL VENOUS BLD VENIPUNCTURE: CPT

## 2018-11-12 PROCEDURE — 80061 LIPID PANEL: CPT

## 2018-11-12 PROCEDURE — 81001 URINALYSIS AUTO W/SCOPE: CPT

## 2018-11-12 PROCEDURE — 85025 COMPLETE CBC W/AUTO DIFF WBC: CPT

## 2018-11-12 PROCEDURE — 80053 COMPREHEN METABOLIC PANEL: CPT

## 2019-06-27 ENCOUNTER — TELEPHONE (OUTPATIENT)
Dept: OBGYN CLINIC | Facility: CLINIC | Age: 49
End: 2019-06-27

## 2019-06-27 DIAGNOSIS — Z30.41 ENCOUNTER FOR SURVEILLANCE OF CONTRACEPTIVE PILLS: ICD-10-CM

## 2019-07-17 ENCOUNTER — ANNUAL EXAM (OUTPATIENT)
Dept: OBGYN CLINIC | Facility: CLINIC | Age: 49
End: 2019-07-17
Payer: COMMERCIAL

## 2019-07-17 VITALS
WEIGHT: 184 LBS | DIASTOLIC BLOOD PRESSURE: 76 MMHG | HEIGHT: 66 IN | SYSTOLIC BLOOD PRESSURE: 118 MMHG | BODY MASS INDEX: 29.57 KG/M2

## 2019-07-17 DIAGNOSIS — Z12.39 ENCOUNTER FOR SCREENING BREAST EXAMINATION: Primary | ICD-10-CM

## 2019-07-17 DIAGNOSIS — Z01.419 ENCOUNTER FOR ANNUAL ROUTINE GYNECOLOGICAL EXAMINATION: ICD-10-CM

## 2019-07-17 DIAGNOSIS — Z30.41 ENCOUNTER FOR SURVEILLANCE OF CONTRACEPTIVE PILLS: ICD-10-CM

## 2019-07-17 PROCEDURE — S0612 ANNUAL GYNECOLOGICAL EXAMINA: HCPCS | Performed by: OBSTETRICS & GYNECOLOGY

## 2019-07-17 NOTE — PROGRESS NOTES
Assessment/Plan:    The patient was informed of a stable gyn examination  A Pap smear was performed because of her history of LILY 3 in the past   She continued to take 1/2 of the birth control pill daily to help with her perimenopausal symptoms  Will stop at age 49  She denies any problem with intimacy  Her mammograms are up-to-date  She should return my office in 1 year  She will continue to try to exercise and lose weight  Subjective:      Patient ID: Renea Hatfield is a 51 y o  female  HPI       This is a 52-year-old white female, she is a  4 para 2 with 2 prior vaginal deliveries  She is now perimenopausal   Her method of contraception includes vasectomy  She is still taking birth control pills for menopause symptoms  SHE IS TAKING HALF A BIRTH CONTROL PILL DAILY  She denies any vaginal bleeding  She had not had a period in over 3 years  She says she needs the birth control pill to help her with sleep  She does have a history of anxiety taking Klonopin  She also has a prescription for Ambien to help with sleep  Since restarting the birth control pill her menopause symptoms are much improved  It should be noted she is stop smoking after discussion last year  We will now continue the birth control pill for up to age 49  She was reminded watch for any signs of headaches blurred vision chest pain shortness of breath  There are no new major family illnesses to report  She is not happy with her weight will try to  exercise  She has a dentist on a regular basis  The following portions of the patient's history were reviewed and updated as appropriate: allergies, current medications, past family history, past medical history, past social history, past surgical history and problem list     Review of Systems   All other systems reviewed and are negative        PAST MEDICAL HISTORY   significant for perimenopausal symptoms, anxiety, severe dysplasia of the cervix,    PAST SURGICAL HISTORY   history of neck and back surgery, cone biopsy, bilateral breast augmentation    FAMILY HISTORY  positive for heart disease    SOCIAL HISTORY   former smoker, positive for social alcohol    Objective:      /76   Ht 5' 6" (1 676 m)   Wt 83 5 kg (184 lb)   BMI 29 70 kg/m²          Physical Exam   Constitutional: She is oriented to person, place, and time  She appears well-developed and well-nourished  HENT:   Head: Normocephalic  Eyes: EOM are normal    Neck: Normal range of motion  Neck supple  Cardiovascular: Normal rate, regular rhythm and normal heart sounds  Pulmonary/Chest: Effort normal and breath sounds normal  Right breast exhibits no inverted nipple, no mass, no nipple discharge, no skin change and no tenderness  Left breast exhibits no inverted nipple, no mass, no nipple discharge, no skin change and no tenderness  No breast swelling, tenderness, discharge or bleeding  Breasts are symmetrical    There is evidence of recent bilateral breast augmentation   Abdominal: Soft  She exhibits no distension and no mass  There is no tenderness  There is no rebound and no guarding  No hernia  Hernia confirmed negative in the right inguinal area and confirmed negative in the left inguinal area  Genitourinary: Rectum normal and uterus normal  No labial fusion  There is no rash, tenderness, lesion or injury on the right labia  There is no rash, tenderness, lesion or injury on the left labia  Uterus is not deviated, not enlarged, not fixed and not tender  Cervix exhibits no motion tenderness, no discharge and no friability  Right adnexum displays no mass, no tenderness and no fullness  Left adnexum displays no mass, no tenderness and no fullness  No erythema, tenderness or bleeding in the vagina  No foreign body in the vagina  No signs of injury around the vagina  No vaginal discharge found  Genitourinary Comments: A Pap smear was performed   Musculoskeletal: Normal range of motion  Lymphadenopathy: No inguinal adenopathy noted on the right or left side  Neurological: She is alert and oriented to person, place, and time  Skin: Skin is warm and dry  Psychiatric: She has a normal mood and affect   Her behavior is normal

## 2019-07-17 NOTE — PATIENT INSTRUCTIONS
The patient was informed of a stable gyn examination  Pap smear was performed because of a history of LILY 3 in the past   She will be allowed to continue taking 1/2 the birth control pill as directed above  She should return my office in 1 year  Mammograms are up-to-date

## 2019-08-24 DIAGNOSIS — Z30.41 ENCOUNTER FOR SURVEILLANCE OF CONTRACEPTIVE PILLS: ICD-10-CM

## 2019-08-26 RX ORDER — NORETHINDRONE ACETATE AND ETHINYL ESTRADIOL, ETHINYL ESTRADIOL AND FERROUS FUMARATE 1MG-10(24)
KIT ORAL
Qty: 28 TABLET | Refills: 0 | Status: SHIPPED | OUTPATIENT
Start: 2019-08-26 | End: 2020-02-24 | Stop reason: SDUPTHER

## 2020-02-24 DIAGNOSIS — Z30.41 ENCOUNTER FOR SURVEILLANCE OF CONTRACEPTIVE PILLS: ICD-10-CM

## 2020-02-24 NOTE — TELEPHONE ENCOUNTER
Pt is taking 1/2 - 1 tab daily Lo Loestrin depending on severity of hot flashes - please sign off on presc to [facility])

## 2020-02-24 NOTE — TELEPHONE ENCOUNTER
Pt here for yearly 2019 - pt taking 1/2 pill Lo Loestrin for perimenopausal sx  Your note says stop @ age 49  She is age 53 ( [birth year])  Do you want her to continue same? she is calling for refill

## 2020-03-18 ENCOUNTER — OFFICE VISIT (OUTPATIENT)
Dept: URGENT CARE | Age: 50
End: 2020-03-18
Payer: COMMERCIAL

## 2020-03-18 VITALS
SYSTOLIC BLOOD PRESSURE: 115 MMHG | BODY MASS INDEX: 25.71 KG/M2 | HEIGHT: 66 IN | RESPIRATION RATE: 18 BRPM | OXYGEN SATURATION: 97 % | DIASTOLIC BLOOD PRESSURE: 70 MMHG | HEART RATE: 85 BPM | TEMPERATURE: 99 F | WEIGHT: 160 LBS

## 2020-03-18 DIAGNOSIS — L08.9 FOOT INFECTION: Primary | ICD-10-CM

## 2020-03-18 PROCEDURE — G0382 LEV 3 HOSP TYPE B ED VISIT: HCPCS | Performed by: PHYSICIAN ASSISTANT

## 2020-03-18 RX ORDER — CEPHALEXIN 500 MG/1
500 CAPSULE ORAL EVERY 8 HOURS SCHEDULED
Qty: 30 CAPSULE | Refills: 0 | Status: SHIPPED | OUTPATIENT
Start: 2020-03-18 | End: 2020-03-28

## 2020-03-18 RX ORDER — IBUPROFEN 800 MG/1
800 TABLET ORAL EVERY 8 HOURS SCHEDULED
Qty: 30 TABLET | Refills: 0 | Status: SHIPPED | OUTPATIENT
Start: 2020-03-18 | End: 2021-10-01 | Stop reason: ALTCHOICE

## 2020-03-18 NOTE — PROGRESS NOTES
[facility]        NAME: Olga Gudino is a 53 y o  female  : 1970    MRN: [id number]  DATE: 2020  TIME: 1:35 PM    Assessment and Plan   Foot infection [L08 9]  1  Foot infection  ibuprofen (MOTRIN) 800 mg tablet    cephalexin (KEFLEX) 500 mg capsule         Patient Instructions       Follow up with PCP in 3-5 days  Proceed to  ER if symptoms worsen  Chief Complaint     Chief Complaint   Patient presents with    Foot Pain     Yesterday in a m  her right foot and right toe are hurting and swollen  The pain feels like she stepped on a nail  She is having a hard time walking, not sure what she did  Only thing she can think of is the day before she was exercising at the gym and added more weight than usual and it's the machine you push the feet forward  Has been taking Ibuprofen, icing and elivating  History of Present Illness       Patient is here for evaluation of pain in her right great toe  She denies any known injury or trauma to the area  She did not bump it, banging, stubbed her toe  She was at the gym yesterday doing some leg presses but does not recall any injury or trauma to the area  She denies any fevers, chills, body aches  Review of Systems   Review of Systems   Constitutional: Negative  Musculoskeletal: Positive for gait problem  Skin: Positive for color change           Current Medications       Current Outpatient Medications:     CHANTIX CONTINUING MONTH GERTRUDIS 1 MG tablet, Take 1 tablet by mouth daily, Disp: , Rfl: 0    Norethin-Eth Estrad-Fe Biphas (Lo Loestrin Fe) 1 MG-10 MCG / 10 MCG TABS, Take 1 tablet by mouth daily, Disp: 28 tablet, Rfl: 3    zolpidem (AMBIEN CR) 6 25 MG CR tablet, Take 6 25 mg by mouth daily at bedtime as needed for sleep, Disp: , Rfl:     ALPRAZolam (XANAX) 0 5 mg tablet, Take 1 tablet (0 5 mg total) by mouth 30 min pre-procedure Take 1 tablet 2 hours prior to procedure, may repeat x 1, 30 min prior to procedure (Patient not taking: Reported on 3/18/2020), Disp: 2 tablet, Rfl: 0    cephalexin (KEFLEX) 500 mg capsule, Take 1 capsule (500 mg total) by mouth every 8 (eight) hours for 10 days, Disp: 30 capsule, Rfl: 0    clonazePAM (KlonoPIN) 2 mg tablet, Take 2 mg by mouth 2 (two) times a day as needed for seizures, Disp: , Rfl:     ibuprofen (MOTRIN) 800 mg tablet, Take 1 tablet (800 mg total) by mouth every 8 (eight) hours for 10 days, Disp: 30 tablet, Rfl: 0    omeprazole (PriLOSEC) 40 MG capsule, TAKE 1 CAPSULE BY MOUTH EVERY DAY BEFORE MEALS, Disp: , Rfl: 5    Current Allergies     Allergies as of 03/18/2020 - Reviewed 03/18/2020   Allergen Reaction Noted    Morphine GI Intolerance 06/01/2017            The following portions of the patient's history were reviewed and updated as appropriate: allergies, current medications, past family history, past medical history, past social history, past surgical history and problem list      Past Medical History:   Diagnosis Date    Anxiety     PONV (postoperative nausea and vomiting)        Past Surgical History:   Procedure Laterality Date    BREAST SURGERY      CERVICAL BIOPSY N/A 6/16/2017    Procedure: CONE BIOPSY;  Surgeon: Young Rowe MD;  [facility];  Service: Gynecology    EXPLORATORY LAPAROTOMY      Age 17, ovarian cysts    NECK SURGERY      Last Assessed 7/14/2016       Family History   Problem Relation Age of Onset    Other Mother         No Pertinent Family History    Other Father         No Pertinent Family History         Medications have been verified  Objective   /70 (BP Location: Left arm, Patient Position: Sitting)   Pulse 85   Temp 99 °F (37 2 °C) (Temporal)   Resp 18   Ht 5' 6" (1 676 m)   Wt 72 6 kg (160 lb)   SpO2 97%   BMI 25 82 kg/m²        Physical Exam     Physical Exam   Constitutional: She is oriented to person, place, and time  She appears well-developed and well-nourished  No distress     HENT:   Head: Normocephalic and atraumatic  Musculoskeletal:   Range of motion of the right great toe intact but limited due to pain  There is some focal soft tissue swelling on the medial aspect of the great toe  There is a small central deep erythema with mild surrounding erythema  Slight warmth to the area     No induration  No purulent discharge  No MTP joint involvement  No tenderness on the dorsal medial or plantar aspect  No ecchymosis  Neurological: She is alert and oriented to person, place, and time  Skin: Skin is warm and dry  No rash noted  She is not diaphoretic  Psychiatric: She has a normal mood and affect  Her behavior is normal  Judgment and thought content normal    Nursing note and vitals reviewed

## 2020-03-18 NOTE — PATIENT INSTRUCTIONS
1   Warm water and Epsom salt soaks frequently    2  Take probiotics [i e  Yogurt, Acidophilus, Florastor (liquid)] daily  3   Over-the-counter medications as needed for symptomatic care  4    Advance activities as tolerated  5    Follow-up with orthopedics in 3-4 days if symptoms persist  [phone]    6  Go to emergency room if symptoms are worsening

## 2020-03-20 ENCOUNTER — TELEPHONE (OUTPATIENT)
Dept: PODIATRY | Facility: CLINIC | Age: 50
End: 2020-03-20

## 2020-03-20 ENCOUNTER — TELEPHONE (OUTPATIENT)
Dept: OBGYN CLINIC | Facility: HOSPITAL | Age: 50
End: 2020-03-20

## 2020-03-20 ENCOUNTER — OFFICE VISIT (OUTPATIENT)
Dept: URGENT CARE | Age: 50
End: 2020-03-20
Payer: COMMERCIAL

## 2020-03-20 ENCOUNTER — APPOINTMENT (OUTPATIENT)
Dept: RADIOLOGY | Age: 50
End: 2020-03-20
Attending: FAMILY MEDICINE
Payer: COMMERCIAL

## 2020-03-20 VITALS
WEIGHT: 160 LBS | HEART RATE: 83 BPM | HEIGHT: 66 IN | SYSTOLIC BLOOD PRESSURE: 139 MMHG | BODY MASS INDEX: 25.71 KG/M2 | OXYGEN SATURATION: 96 % | RESPIRATION RATE: 18 BRPM | DIASTOLIC BLOOD PRESSURE: 68 MMHG | TEMPERATURE: 98.7 F

## 2020-03-20 DIAGNOSIS — M79.671 RIGHT FOOT PAIN: ICD-10-CM

## 2020-03-20 DIAGNOSIS — M79.671 RIGHT FOOT PAIN: Primary | ICD-10-CM

## 2020-03-20 PROCEDURE — G0381 LEV 2 HOSP TYPE B ED VISIT: HCPCS | Performed by: FAMILY MEDICINE

## 2020-03-20 PROCEDURE — 73630 X-RAY EXAM OF FOOT: CPT

## 2020-03-20 NOTE — TELEPHONE ENCOUNTER
Patient called to schedule an appointment with a doctor for her foot  She was screened and answered yes to having been around someone that has been tested for COVID-19  I was advised by the triage nurse to send her over to the hotline to get properly screened

## 2020-03-20 NOTE — TELEPHONE ENCOUNTER
Called pt to let her know that Ortho had called me to let me know that she has been in contact with someone that has been screened for COVID-19 with pending results  When I made the appt, the pt did not mention while I was screening her  She went back to urgent care to get an xray  I called her to let her know that we are going to try and do a video visit for her as to decrease any risk of infection for staff and other patients   She is aware and I will call her when I know something

## 2020-03-20 NOTE — PROGRESS NOTES
[facility]        NAME: Sully Ch is a 53 y o  female  : 1970    MRN: [id number]  DATE: 2020  TIME: 10:06 AM    Assessment and Plan   Right foot pain [M79 671]  1  Right foot pain  XR foot 3+ vw right         Patient Instructions     Patient Instructions   Rest, elevate right foot, limit activity  Continue present care  Recheck/follow-up with the podiatrist Monday (2020) as scheduled  Please go to the hospital emergency department if needed  Follow up with Podiatrist/PCP in 3-5 days  Proceed to  ER if symptoms worsen  Chief Complaint     Chief Complaint   Patient presents with    Foot Pain     Was seen here Wednesday a m  and refused x-ray of foot due to ins  She is not any better and podiatrist can't see her until Monday  They told her to come here           History of Present Illness       Patient with right foot pain; patient thinks she might have injured the right foot at the gym; patient was seen here 2020; patient states she has an appointment with a podiatrist Monday (2020) in the afternoon and needs a right foot x-ray which was offered at the visit 2020      Review of Systems   Review of Systems   Musculoskeletal:        Pain plantar aspect of the right foot under the right 1st MTP joint area         Current Medications       Current Outpatient Medications:     cephalexin (KEFLEX) 500 mg capsule, Take 1 capsule (500 mg total) by mouth every 8 (eight) hours for 10 days, Disp: 30 capsule, Rfl: 0    CHANTIX CONTINUING MONTH GERTRUDIS 1 MG tablet, Take 1 tablet by mouth daily, Disp: , Rfl: 0    clonazePAM (KlonoPIN) 2 mg tablet, Take 2 mg by mouth 2 (two) times a day as needed for seizures, Disp: , Rfl:     ibuprofen (MOTRIN) 800 mg tablet, Take 1 tablet (800 mg total) by mouth every 8 (eight) hours for 10 days, Disp: 30 tablet, Rfl: 0    Norethin-Eth Estrad-Fe Biphas (Lo Loestrin Fe) 1 MG-10 MCG / 10 MCG TABS, Take 1 tablet by mouth daily, Disp: 28 tablet, Rfl: 3    omeprazole (PriLOSEC) 40 MG capsule, TAKE 1 CAPSULE BY MOUTH EVERY DAY BEFORE MEALS, Disp: , Rfl: 5    zolpidem (AMBIEN CR) 6 25 MG CR tablet, Take 6 25 mg by mouth daily at bedtime as needed for sleep, Disp: , Rfl:     ALPRAZolam (XANAX) 0 5 mg tablet, Take 1 tablet (0 5 mg total) by mouth 30 min pre-procedure Take 1 tablet 2 hours prior to procedure, may repeat x 1, 30 min prior to procedure (Patient not taking: Reported on 3/18/2020), Disp: 2 tablet, Rfl: 0    Current Allergies     Allergies as of 03/20/2020 - Reviewed 03/20/2020   Allergen Reaction Noted    Morphine GI Intolerance 06/01/2017            The following portions of the patient's history were reviewed and updated as appropriate: allergies, current medications, past family history, past medical history, past social history, past surgical history and problem list      Past Medical History:   Diagnosis Date    Anxiety     PONV (postoperative nausea and vomiting)        Past Surgical History:   Procedure Laterality Date    BREAST SURGERY      CERVICAL BIOPSY N/A 6/16/2017    Procedure: CONE BIOPSY;  Surgeon: Jaycee Easton MD;  [facility];  Service: Gynecology    EXPLORATORY LAPAROTOMY      Age 17, ovarian cysts    NECK SURGERY      Last Assessed 7/14/2016       Family History   Problem Relation Age of Onset    Other Mother         No Pertinent Family History    Other Father         No Pertinent Family History         Medications have been verified  Objective   /68 (BP Location: Left arm, Patient Position: Sitting)   Pulse 83   Temp 98 7 °F (37 1 °C) (Temporal)   Resp 18   Ht 5' 6" (1 676 m)   Wt 72 6 kg (160 lb)   SpO2 96%   BMI 25 82 kg/m²        Physical Exam     Physical Exam   Constitutional: She is oriented to person, place, and time  She appears well-developed and well-nourished     Musculoskeletal:   Tenderness and slight swelling present plantar aspect under the right 1st MTP joint area; intact pulses, capillary refill, and sensation right lower extremity; patient able to dorsiflex and plantar flex her right foot   Neurological: She is alert and oriented to person, place, and time  Psychiatric: She has a normal mood and affect  Her behavior is normal    Nursing note and vitals reviewed            Right foot x-rays - questionable fracture of the sesamoid bone (patient showed the right foot x-rays)

## 2020-03-20 NOTE — TELEPHONE ENCOUNTER
Patient is calling again, which is shortly after calling before  I asked patient if she was screened & she said yeah, they said I'm good to schedule  I called Jinny Joseph to review the information & Dr Ozzy David said that the patient should call podiatry  I gave the patient the phone number for podiatry

## 2020-03-23 ENCOUNTER — APPOINTMENT (OUTPATIENT)
Dept: RADIOLOGY | Age: 50
End: 2020-03-23

## 2020-03-23 ENCOUNTER — TRANSCRIBE ORDERS (OUTPATIENT)
Dept: ADMINISTRATIVE | Age: 50
End: 2020-03-23

## 2020-03-23 ENCOUNTER — TELEMEDICINE (OUTPATIENT)
Dept: PODIATRY | Facility: CLINIC | Age: 50
End: 2020-03-23

## 2020-03-23 DIAGNOSIS — M79.673 PAIN OF FOOT, UNSPECIFIED LATERALITY: ICD-10-CM

## 2020-03-23 DIAGNOSIS — S92.811D: Primary | ICD-10-CM

## 2020-03-23 PROBLEM — L08.9 FOOT INFECTION: Status: RESOLVED | Noted: 2020-03-18 | Resolved: 2020-03-23

## 2020-03-23 PROCEDURE — 73630 X-RAY EXAM OF FOOT: CPT

## 2020-03-23 PROCEDURE — G2012 BRIEF CHECK IN BY MD/QHP: HCPCS | Performed by: PODIATRIST

## 2020-03-23 NOTE — PROGRESS NOTES
This patient was seen on 3/23/20  Assessment:    Problem List Items Addressed This Visit        Musculoskeletal and Integument    Closed fracture of sesamoid bone of right foot with routine healing - Primary          Plan:    Verbal informed consent regarding proceeding with a virtual visit and HIPAA obtained from patient before starting the visit  This was a non-video virtual visit via phone  1  Xrays of contralateral foot ordered to help discriminate between bipartite sesamoids vs  Sesamoid fractures  2  Patient instructed to continue R  I I C E    3  Patient to continue OTC NSAIDS  4  Patient to use full length camwalker boot for all ambulation  5  We will set up a follow-up virtual visit on Thursday to review xray results and make decisions about next steps     Diagnoses and all orders for this visit:    Closed fracture of sesamoid bone of right foot with routine healing          Subjective:      Patient ID: Madelaine Hinds is a 53 y o  female  Vargas Furnish was evaluated via a virtual visit today due to Covid-19  Her cc is not related to Covid - but rather due to acute foot pain Right  She states she has severe 8/10 pain under the ball of her foot Right that started a week ago  She is an avid gym workout attendee but denies significant change in workouts  She developed pain which rapidly worsened to the point she now limps and struggles going up or down steps  She notes redness, swelling, and warmth surrounding the ball of the Right foot  She sought care at [facility] urgent [facility] site and there was some thought she had an infection though she denies any break in the skin  She was given antibiotics which she took without relief  She went back on Friday as she had no relief and they obtained xrays and told her she had a sesamoid fracture and she was given ACE wraps and a cane and she notes these have not helped her       Her mom is recovering from recent foot surgery and has a full length camboot at home  The following portions of the patient's history were reviewed and updated as appropriate: allergies, current medications, past family history, past medical history, past social history, past surgical history and problem list     Review of Systems   Constitutional: Positive for activity change  Negative for appetite change, chills, diaphoresis, fatigue, fever and unexpected weight change  Respiratory: Negative  Cardiovascular: Negative  Gastrointestinal: Negative  Musculoskeletal: Positive for arthralgias, gait problem and joint swelling  Negative for back pain, myalgias, neck pain and neck stiffness  Skin: Positive for color change  Negative for wound  Neurological: Negative for numbness  Hematological: Negative  Psychiatric/Behavioral: Negative  Objective:    Xrays dated 3/20/20 reviewed personally including images - noting both the tibial and fibular sesamoid are bipartite  I can not a general increase in soft tissue density and swelling as well on the medial forefoot  There were no vitals taken for this visit           Physical Exam      Virtual Visit

## 2020-03-25 NOTE — PROGRESS NOTES
Virtual Regular Visit    Problem List Items Addressed This Visit        Musculoskeletal and Integument    Closed fracture of sesamoid bone of right foot with routine healing - Primary      Other Visit Diagnoses     Pain of foot, unspecified laterality        Relevant Orders    X-ray foot left 3+ views (Completed)               Reason for visit is ***    Encounter provider Michelle Holliday DPM    Provider located at [address] [place] [address] E  [address] [address] [phone]      Recent Visits  No visits were found meeting these conditions  Showing recent visits within past 7 days and meeting all other requirements     Future Appointments  No visits were found meeting these conditions  Showing future appointments within next 150 days and meeting all other requirements        After connecting through [employer], the patient was identified by name and date of birth  Nuria Mora was informed that this is a telemedicine visit and that the visit is being conducted through  which may not be secure and therefore, might not be HIPAA-compliant  She acknowledged consent and understanding of privacy and security of the video platform  The patient has agreed to participate and understands they can discontinue the visit at any time  Subjective  Nuria Mora is a 53 y o  female ***        Past Medical History:   Diagnosis Date    Anxiety     PONV (postoperative nausea and vomiting)        Past Surgical History:   Procedure Laterality Date    BREAST SURGERY      CERVICAL BIOPSY N/A 6/16/2017    Procedure: CONE BIOPSY;  Surgeon: Ivan Stern MD;  [facility];  Service: Gynecology    EXPLORATORY LAPAROTOMY      Age 17, ovarian cysts    NECK SURGERY      Last Assessed 7/14/2016       Current Outpatient Medications   Medication Sig Dispense Refill    ALPRAZolam (XANAX) 0 5 mg tablet Take 1 tablet (0 5 mg total) by mouth 30 min pre-procedure Take 1 tablet 2 hours prior to procedure, may repeat x 1, 30 min prior to procedure (Patient not taking: Reported on 3/18/2020) 2 tablet 0    cephalexin (KEFLEX) 500 mg capsule Take 1 capsule (500 mg total) by mouth every 8 (eight) hours for 10 days 30 capsule 0    CHANTIX CONTINUING MONTH GERTRUDIS 1 MG tablet Take 1 tablet by mouth daily  0    clonazePAM (KlonoPIN) 2 mg tablet Take 2 mg by mouth 2 (two) times a day as needed for seizures      ibuprofen (MOTRIN) 800 mg tablet Take 1 tablet (800 mg total) by mouth every 8 (eight) hours for 10 days 30 tablet 0    Norethin-Eth Estrad-Fe Biphas (Lo Loestrin Fe) 1 MG-10 MCG / 10 MCG TABS Take 1 tablet by mouth daily 28 tablet 3    omeprazole (PriLOSEC) 40 MG capsule TAKE 1 CAPSULE BY MOUTH EVERY DAY BEFORE MEALS  5    zolpidem (AMBIEN CR) 6 25 MG CR tablet Take 6 25 mg by mouth daily at bedtime as needed for sleep       No current facility-administered medications for this visit  Allergies   Allergen Reactions    Morphine GI Intolerance       Review of Systems      I spent *** minutes with the patient during this visit

## 2020-03-30 ENCOUNTER — TELEMEDICINE (OUTPATIENT)
Dept: PODIATRY | Facility: CLINIC | Age: 50
End: 2020-03-30
Payer: OTHER GOVERNMENT

## 2020-03-30 DIAGNOSIS — M25.80 SESAMOIDITIS: Primary | ICD-10-CM

## 2020-03-30 PROBLEM — S92.811D: Status: RESOLVED | Noted: 2020-03-23 | Resolved: 2020-03-30

## 2020-03-30 PROCEDURE — 99212 OFFICE O/P EST SF 10 MIN: CPT | Performed by: PODIATRIST

## 2020-03-30 NOTE — PATIENT INSTRUCTIONS
Metatarsalgia   WHAT YOU NEED TO KNOW:   What is metatarsalgia? Metatarsalgia is pain in the ball of your foot, near your second, third, and fourth toes  What causes or increases my risk for metatarsalgia? · Playing weight-bearing sports, such as tennis or running    · Wearing high heels, or narrow or tight shoes    · Being overweight    · Rheumatoid arthritis, osteoarthritis, or gout    · Foot deformities such as hammer toe or Achilles tendon problems    · Trauma such as a tear in the area where you have pain, or a stress fracture in your foot  What are the signs and symptoms of metatarsalgia? Symptoms usually develop over time, but you may have sudden pain from an injury  You may have any of the following:  · Pain at the ball of your foot or near your toes that gets worse when you walk or stand, especially on hard surfaces    · Pain during exercises such as running    · Sharp or shooting pain in your toes that may get worse when you flex your toes    · Tingling or numbness in your toes    · Feeling like you are walking over rocks, or that you have a bruise on your toe    · A change in the way you walk because you try to avoid putting pressure on the ball of your foot  How is metatarsalgia diagnosed and treated? Your healthcare provider will examine your feet and legs as you stand and walk  X-ray, CT, or ultrasound pictures may show a problem with your foot, such as a fracture  You may be given contrast liquid to help foot problems show up better in the pictures  Tell the healthcare provider if you have ever had an allergic reaction to contrast liquid  The cause of your metatarsalgia will be treated, if possible  You may also need any of the following:  · NSAIDs , such as ibuprofen, help decrease swelling, pain, and fever  This medicine is available with or without a doctor's order  NSAIDs can cause stomach bleeding or kidney problems in certain people   If you take blood thinner medicine, always ask if NSAIDs are safe for you  Always read the medicine label and follow directions  Do not give these medicines to children under 10months of age without direction from your child's healthcare provider  · Ultrasound  may be used to relieve your pain  Sound waves from the ultrasound can help send heat deeper into your tissues  · A steroid injection  may help decrease inflammation  · Surgery  may be needed if other treatments do not work  Surgery may be used to align the bones near your toes  You may also need surgery to fix a problem such as hammertoe  What can I do to manage or prevent metatarsalgia? · Rest your foot  If you play sports, you may not be able to do weight-bearing exercises  Examples include swimming and bike riding  Ask your healthcare provider which exercises are safe for you  · Apply ice as directed  Ice helps reduce pain and swelling  Use an ice pack, or put crushed ice in a plastic bag  Cover the pack or bag with a towel before you apply it to your foot  Apply ice for 15 to 20 minutes every hour, or as directed  · Use a cane or crutch if directed  These devices may help take pressure off your foot while it heals  · Wear proper shoes  Do not wear shoes that are narrow or tight  You may need to wear shoes that are wider than you usually wear  Choose shoes that do not have a raised heel  Shock-absorbing shoes can help prevent injury  These shoes will have extra support under your feet and toes  You can also add shoe cushions inside your shoes or to the bottoms of your feet, near your toes  The cushions may provide more support and make walking or standing more comfortable  Arch supports may help take pressure off your toes  · Reach or maintain a healthy weight  Extra weight can put pressure on your feet  Talk to your healthcare provider about a healthy weight for you  Your provider can help you create a safe weight loss plan if you are overweight      · Go to physical therapy if directed  A physical therapist can help improve your strength and range of motion  The therapist can also help you improve the way you walk to prevent metatarsalgia from happening again  Your therapist can also teach you exercises to help relieve your pain  When should I contact my healthcare provider? · You develop knee, back, or hip pain  · You have more pain or redness in the foot  · You have questions or concerns about your condition or care  CARE AGREEMENT:   You have the right to help plan your care  Learn about your health condition and how it may be treated  Discuss treatment options with your caregivers to decide what care you want to receive  You always have the right to refuse treatment  The above information is an  only  It is not intended as medical advice for individual conditions or treatments  Talk to your doctor, nurse or pharmacist before following any medical regimen to see if it is safe and effective for you  © 2017 [address] Information is for End User's use only and may not be sold, redistributed or otherwise used for commercial purposes  All illustrations and images included in CareNotes® are the copyrighted property of A D A M , Inc  or Deven Childs

## 2020-03-30 NOTE — PROGRESS NOTES
Virtual Regular Visit    Problem List Items Addressed This Visit        Musculoskeletal and Integument    Sesamoiditis - Primary               Reason for visit is 3/30/20    Encounter provider Iron Lloyd DPM    Provider located at [address] [place] [id number] [phone]      Recent Visits  Date Type Provider Dept   03/23/20 Telemedicine Iron Lloyd DPM [facility]   Showing recent visits within past 7 days and meeting all other requirements     Today's Visits  Date Type Provider Dept   03/30/20 Telemedicine Iron Lloyd DPM [facility]   Showing today's visits and meeting all other requirements     Future Appointments  No visits were found meeting these conditions  Showing future appointments within next 150 days and meeting all other requirements        The patient was identified by name and date of birth  Noman Hill was informed that this is a telemedicine visit and that the visit is being conducted through [employer]  My office door was closed  No one else was in the room  She acknowledged consent and understanding of privacy and security of the video platform  The patient has agreed to participate and understands they can discontinue the visit at any time  Patient is aware this is a billable service  Subjective  Noman Hill is a 53 y o  female with a cc of pain in the ball of her Right foot  This is her second visit with me  Since her last visit, she completed xrays that I ordered of her contralateral foot, used a full length camboot x 4 days, iced, rested the foot, and took OTC NSAID PRN  She notes an 80% reduction in the pain in her foot         Past Medical History:   Diagnosis Date    Anxiety     PONV (postoperative nausea and vomiting)        Past Surgical History:   Procedure Laterality Date    BREAST SURGERY      CERVICAL BIOPSY N/A 6/16/2017    Procedure: CONE BIOPSY; Surgeon: Claudine Dai MD;  [facility];  Service: Gynecology    EXPLORATORY LAPAROTOMY      Age 17, ovarian cysts    NECK SURGERY      Last Assessed 7/14/2016       Current Outpatient Medications   Medication Sig Dispense Refill    ALPRAZolam (XANAX) 0 5 mg tablet Take 1 tablet (0 5 mg total) by mouth 30 min pre-procedure Take 1 tablet 2 hours prior to procedure, may repeat x 1, 30 min prior to procedure (Patient not taking: Reported on 3/18/2020) 2 tablet 0    CHANTIX CONTINUING MONTH GERTRUDIS 1 MG tablet Take 1 tablet by mouth daily  0    clonazePAM (KlonoPIN) 2 mg tablet Take 2 mg by mouth 2 (two) times a day as needed for seizures      ibuprofen (MOTRIN) 800 mg tablet Take 1 tablet (800 mg total) by mouth every 8 (eight) hours for 10 days 30 tablet 0    Norethin-Eth Estrad-Fe Biphas (Lo Loestrin Fe) 1 MG-10 MCG / 10 MCG TABS Take 1 tablet by mouth daily 28 tablet 3    omeprazole (PriLOSEC) 40 MG capsule TAKE 1 CAPSULE BY MOUTH EVERY DAY BEFORE MEALS  5    zolpidem (AMBIEN CR) 6 25 MG CR tablet Take 6 25 mg by mouth daily at bedtime as needed for sleep       No current facility-administered medications for this visit  Allergies   Allergen Reactions    Morphine GI Intolerance       Review of Systems   Constitutional: Positive for activity change  Negative for appetite change, chills, diaphoresis, fatigue, fever and unexpected weight change  Respiratory: Negative  Cardiovascular: Negative  Gastrointestinal: Negative  Musculoskeletal: Negative for arthralgias, back pain, gait problem, joint swelling, myalgias, neck pain and neck stiffness  Skin: Positive for color change  Negative for wound  Neurological: Negative for numbness  Hematological: Negative  Psychiatric/Behavioral: Negative  Overall, she notes the redness, warmth, and swelling as well as pain has mostly resolved       Physical Exam     I did personally review the plain radiographs of the Left foot dated 3/23/20 [id number] with the Right foot xrays from 3/20/20; noting that there is a bipartite tibial and fibular sesamoid on the Right and bipartite tibial sesamoid on the Left  I spent 10 minutes with the patient during this visit  Impression:    Bipartite sesamoid Right with resolving sesamoiditis  I effectively ruled out sesamoid fracture  Plan:    Educated patient on nature of bipartite sesamoids, sesamoiditis using digital graphics and anatomy photos  Recommended she continue R  I I C E  Until all pain is resolved  She may resume normal activities as tolerated being careful to avoid "old shoes", barefoot on hard surfaces, or treadmill / stepper until she's transitioned through less impact-type machines such as [employer] first      She will reschedule with me PRN recurrence for consideration of corticosteroid injection and / or orthotics

## 2020-05-13 DIAGNOSIS — Z30.41 ENCOUNTER FOR SURVEILLANCE OF CONTRACEPTIVE PILLS: ICD-10-CM

## 2020-05-13 RX ORDER — NORETHINDRONE ACETATE AND ETHINYL ESTRADIOL, ETHINYL ESTRADIOL AND FERROUS FUMARATE 1MG-10(24)
KIT ORAL
Qty: 84 TABLET | Refills: 1 | Status: SHIPPED | OUTPATIENT
Start: 2020-05-13 | End: 2020-12-21 | Stop reason: SDUPTHER

## 2020-12-19 DIAGNOSIS — Z30.41 ENCOUNTER FOR SURVEILLANCE OF CONTRACEPTIVE PILLS: ICD-10-CM

## 2020-12-19 RX ORDER — NORETHINDRONE ACETATE AND ETHINYL ESTRADIOL, ETHINYL ESTRADIOL AND FERROUS FUMARATE 1MG-10(24)
KIT ORAL
Qty: 28 TABLET | Refills: 5 | OUTPATIENT
Start: 2020-12-19

## 2020-12-21 RX ORDER — NORETHINDRONE ACETATE AND ETHINYL ESTRADIOL, ETHINYL ESTRADIOL AND FERROUS FUMARATE 1MG-10(24)
1 KIT ORAL DAILY
Qty: 84 TABLET | Refills: 0 | Status: SHIPPED | OUTPATIENT
Start: 2020-12-21 | End: 2021-03-30

## 2021-03-09 ENCOUNTER — ANNUAL EXAM (OUTPATIENT)
Dept: OBGYN CLINIC | Facility: CLINIC | Age: 51
End: 2021-03-09
Payer: COMMERCIAL

## 2021-03-09 VITALS
DIASTOLIC BLOOD PRESSURE: 80 MMHG | HEIGHT: 66 IN | WEIGHT: 188.2 LBS | SYSTOLIC BLOOD PRESSURE: 138 MMHG | BODY MASS INDEX: 30.25 KG/M2

## 2021-03-09 DIAGNOSIS — Z12.39 ENCOUNTER FOR SCREENING BREAST EXAMINATION: ICD-10-CM

## 2021-03-09 DIAGNOSIS — Z12.31 ENCOUNTER FOR SCREENING MAMMOGRAM FOR MALIGNANT NEOPLASM OF BREAST: ICD-10-CM

## 2021-03-09 DIAGNOSIS — Z01.419 WOMEN'S ANNUAL ROUTINE GYNECOLOGICAL EXAMINATION: Primary | ICD-10-CM

## 2021-03-09 PROCEDURE — S0612 ANNUAL GYNECOLOGICAL EXAMINA: HCPCS | Performed by: OBSTETRICS & GYNECOLOGY

## 2021-03-09 NOTE — PROGRESS NOTES
Assessment/Plan:      The patient was informed of a stable menopausal gyn examination  The patient does have a history of a cone biopsy for severe dysplasia the past   She has a past year today  We had a discussion about colorectal screening  She will discuss her primary physician about possibly using the outpatient manner of colon screening  She will continue to get yearly mammograms  She had a Pap smear done today  She is not happy with her weight  Menopause symptoms are under control  There is no problem with intimacy  She feels safe at home  She is dealing well with COVID  She will try to exercise and lose more weight  Subjective:      Patient ID: Nuria Mora is a 49 y o  female  HPI    This is a 26-year-old white female, she is a  4 para 2 with 2 prior vaginal deliveries  She is now menopausal   She has not had a period in over 2 years  She does have a history of severe dysplasia in the past he with cone biopsy  Her last Pap smears have been normal   She had a Pap smear today  She is not happy with her weight gain  Denies any problem depression or anxiety  She has a dentist on a regular basis  she feels safe at home  She is dealing well with COVID situation  We had a discussion about colorectal screening  The following portions of the patient's history were reviewed and updated as appropriate: allergies, current medications, past family history, past medical history, past social history, past surgical history and problem list     Review of Systems   All other systems reviewed and are negative  Objective:      /80   Ht 5' 6" (1 676 m)   Wt 85 4 kg (188 lb 3 2 oz)   BMI 30 38 kg/m²          Physical Exam  Exam conducted with a chaperone present  Constitutional:       Appearance: Normal appearance  HENT:      Head: Normocephalic and atraumatic  Eyes:      Extraocular Movements: Extraocular movements intact     Neck:      Musculoskeletal: Normal range of motion and neck supple  Cardiovascular:      Rate and Rhythm: Normal rate and regular rhythm  Pulses: Normal pulses  Heart sounds: Normal heart sounds  Pulmonary:      Effort: Pulmonary effort is normal       Breath sounds: Normal breath sounds  Chest:      Breasts: Breasts are symmetrical          Right: Normal  No swelling, bleeding, inverted nipple, mass, nipple discharge or skin change  Left: Normal  No swelling, bleeding, inverted nipple, mass, nipple discharge or skin change  Abdominal:      General: Abdomen is flat  Bowel sounds are normal  There is no distension  Palpations: Abdomen is soft  There is no hepatomegaly, splenomegaly or mass  Tenderness: There is no abdominal tenderness  Hernia: No hernia is present  There is no hernia in the umbilical area, ventral area, left inguinal area, right femoral area, left femoral area or right inguinal area  Genitourinary:     General: Normal vulva  Pubic Area: No rash or pubic lice  Labia:         Right: No rash, tenderness, lesion or injury  Left: No rash, tenderness, lesion or injury  Urethra: No prolapse, urethral pain, urethral swelling or urethral lesion  Vagina: Normal  No signs of injury and foreign body  No vaginal discharge, erythema, tenderness, bleeding, lesions or prolapsed vaginal walls  Cervix: Normal       Uterus: Normal  Not deviated, not enlarged, not fixed, not tender and no uterine prolapse  Adnexa: Right adnexa normal and left adnexa normal         Right: No mass, tenderness or fullness  Left: No mass, tenderness or fullness  Rectum: Normal       Comments: The external genitalia normal limits the vagina is clean  The cervix is closed  There was evidence of prior cone biopsy which is well-healed  A Pap smear was performed  Uterus is anterior normal size there is no cervical motion tenderness  The adnexa clear bilaterally    There is no prolapse of the urethra of the bladder  Musculoskeletal: Normal range of motion  Lymphadenopathy:      Lower Body: No right inguinal adenopathy  No left inguinal adenopathy  Skin:     General: Skin is warm and dry  Neurological:      General: No focal deficit present  Mental Status: She is alert and oriented to person, place, and time     Psychiatric:         Mood and Affect: Mood normal          Behavior: Behavior normal

## 2021-03-09 NOTE — PATIENT INSTRUCTIONS
The patient was instructed to make arrangement with the primary physician to consider colorectal screening either with colonoscopy or an outpatient basis  She will continue tried exercise and lose weight  She will continue to get yearly mammograms  She had a Pap smear today    She should return my office

## 2021-03-15 LAB
CLINICAL INFO: NORMAL
CYTO CVX: NORMAL
CYTOLOGY CMNT CVX/VAG CYTO-IMP: NORMAL
DATE PREVIOUS BX: NORMAL
HPV E6+E7 MRNA CVX QL NAA+PROBE: NOT DETECTED
LMP START DATE: NORMAL
MICROORGANISM CVX/VAG CYTO: NORMAL
SL AMB PREV. PAP:: NORMAL
SPECIMEN SOURCE CVX/VAG CYTO: NORMAL

## 2021-03-16 ENCOUNTER — TELEPHONE (OUTPATIENT)
Dept: OBGYN CLINIC | Facility: CLINIC | Age: 51
End: 2021-03-16

## 2021-03-16 DIAGNOSIS — B37.3 YEAST VAGINITIS: Primary | ICD-10-CM

## 2021-03-16 RX ORDER — FLUCONAZOLE 150 MG/1
150 TABLET ORAL DAILY
Qty: 2 TABLET | Refills: 0 | Status: SHIPPED | OUTPATIENT
Start: 2021-03-16 | End: 2021-03-18

## 2021-03-16 NOTE — TELEPHONE ENCOUNTER
----- Message from Judy Sanders MD sent at 3/16/2021 11:23 AM EDT -----  Please inform this patient of positive yeast infection on Pap smear may treat with Diflucan x2 days thank you

## 2021-03-30 DIAGNOSIS — Z30.41 ENCOUNTER FOR SURVEILLANCE OF CONTRACEPTIVE PILLS: ICD-10-CM

## 2021-03-30 RX ORDER — NORETHINDRONE ACETATE AND ETHINYL ESTRADIOL, ETHINYL ESTRADIOL AND FERROUS FUMARATE 1MG-10(24)
KIT ORAL
Qty: 84 TABLET | Refills: 2 | Status: SHIPPED | OUTPATIENT
Start: 2021-03-30 | End: 2021-11-16 | Stop reason: SDUPTHER

## 2021-06-04 DIAGNOSIS — G47.00 INSOMNIA, UNSPECIFIED TYPE: Primary | ICD-10-CM

## 2021-06-04 RX ORDER — ZOLPIDEM TARTRATE 12.5 MG/1
12.5 TABLET, FILM COATED, EXTENDED RELEASE ORAL
COMMUNITY
End: 2021-06-04 | Stop reason: SDUPTHER

## 2021-06-04 NOTE — TELEPHONE ENCOUNTER
LOV:  5/12/2021 with Dr Rolando Genao office  NOV:  Not Scheduled Yet    Please do PDMP and forward to [address] for review

## 2021-06-05 RX ORDER — ZOLPIDEM TARTRATE 12.5 MG/1
12.5 TABLET, FILM COATED, EXTENDED RELEASE ORAL
Qty: 30 TABLET | Refills: 0 | Status: SHIPPED | OUTPATIENT
Start: 2021-06-05 | End: 2021-07-05 | Stop reason: SDUPTHER

## 2021-07-05 DIAGNOSIS — G47.00 INSOMNIA, UNSPECIFIED TYPE: ICD-10-CM

## 2021-07-06 RX ORDER — ZOLPIDEM TARTRATE 12.5 MG/1
12.5 TABLET, FILM COATED, EXTENDED RELEASE ORAL
Qty: 30 TABLET | Refills: 0 | Status: SHIPPED | OUTPATIENT
Start: 2021-07-06 | End: 2021-08-04 | Stop reason: SDUPTHER

## 2021-08-04 DIAGNOSIS — G47.00 INSOMNIA, UNSPECIFIED TYPE: ICD-10-CM

## 2021-08-04 NOTE — TELEPHONE ENCOUNTER
Patient called in to check the status of her refill request for Ambien  Please advise when this will be called in

## 2021-08-05 RX ORDER — ZOLPIDEM TARTRATE 12.5 MG/1
12.5 TABLET, FILM COATED, EXTENDED RELEASE ORAL
Qty: 30 TABLET | Refills: 0 | Status: SHIPPED | OUTPATIENT
Start: 2021-08-05 | End: 2021-08-31 | Stop reason: SDUPTHER

## 2021-08-31 DIAGNOSIS — G47.00 INSOMNIA, UNSPECIFIED TYPE: ICD-10-CM

## 2021-09-02 RX ORDER — ZOLPIDEM TARTRATE 12.5 MG/1
12.5 TABLET, FILM COATED, EXTENDED RELEASE ORAL
Qty: 30 TABLET | Refills: 0 | Status: SHIPPED | OUTPATIENT
Start: 2021-09-02 | End: 2021-10-01 | Stop reason: SDUPTHER

## 2021-09-24 ENCOUNTER — RA CDI HCC (OUTPATIENT)
Dept: OTHER | Facility: HOSPITAL | Age: 51
End: 2021-09-24

## 2021-09-24 NOTE — PROGRESS NOTES
[facility]  coding opportunities       Chart reviewed, no opportunity found: CHART REVIEWED, NO OPPORTUNITY FOUND                        Patients insurance company: [employer] (Medicare Advantage and Commercial)

## 2021-09-29 PROBLEM — F51.01 PRIMARY INSOMNIA: Status: ACTIVE | Noted: 2021-09-29

## 2021-09-29 PROBLEM — F41.9 ANXIETY: Status: ACTIVE | Noted: 2021-09-29

## 2021-10-01 ENCOUNTER — OFFICE VISIT (OUTPATIENT)
Dept: INTERNAL MEDICINE CLINIC | Facility: CLINIC | Age: 51
End: 2021-10-01
Payer: COMMERCIAL

## 2021-10-01 VITALS
OXYGEN SATURATION: 98 % | DIASTOLIC BLOOD PRESSURE: 80 MMHG | HEIGHT: 66 IN | HEART RATE: 86 BPM | BODY MASS INDEX: 29.67 KG/M2 | SYSTOLIC BLOOD PRESSURE: 120 MMHG | WEIGHT: 184.6 LBS | TEMPERATURE: 98.1 F

## 2021-10-01 DIAGNOSIS — G47.00 INSOMNIA, UNSPECIFIED TYPE: ICD-10-CM

## 2021-10-01 DIAGNOSIS — R73.01 IMPAIRED FASTING GLUCOSE: ICD-10-CM

## 2021-10-01 DIAGNOSIS — Z12.12 ENCOUNTER FOR COLORECTAL CANCER SCREENING: Primary | ICD-10-CM

## 2021-10-01 DIAGNOSIS — Z12.11 ENCOUNTER FOR COLORECTAL CANCER SCREENING: Primary | ICD-10-CM

## 2021-10-01 DIAGNOSIS — Z00.00 ANNUAL PHYSICAL EXAM: ICD-10-CM

## 2021-10-01 DIAGNOSIS — Z12.31 ENCOUNTER FOR SCREENING MAMMOGRAM FOR BREAST CANCER: ICD-10-CM

## 2021-10-01 PROBLEM — E66.3 OVERWEIGHT (BMI 25.0-29.9): Status: ACTIVE | Noted: 2021-10-01

## 2021-10-01 PROBLEM — D06.9 SEVERE CERVICAL DYSPLASIA: Status: RESOLVED | Noted: 2017-06-16 | Resolved: 2021-10-01

## 2021-10-01 PROBLEM — M25.80 SESAMOIDITIS: Status: RESOLVED | Noted: 2020-03-30 | Resolved: 2021-10-01

## 2021-10-01 PROCEDURE — 3725F SCREEN DEPRESSION PERFORMED: CPT | Performed by: NURSE PRACTITIONER

## 2021-10-01 PROCEDURE — 99214 OFFICE O/P EST MOD 30 MIN: CPT | Performed by: NURSE PRACTITIONER

## 2021-10-01 PROCEDURE — 3008F BODY MASS INDEX DOCD: CPT | Performed by: NURSE PRACTITIONER

## 2021-10-01 RX ORDER — ZOLPIDEM TARTRATE 12.5 MG/1
12.5 TABLET, FILM COATED, EXTENDED RELEASE ORAL
Qty: 30 TABLET | Refills: 0 | Status: SHIPPED | OUTPATIENT
Start: 2021-10-01 | End: 2021-11-02 | Stop reason: SDUPTHER

## 2021-11-02 DIAGNOSIS — G47.00 INSOMNIA, UNSPECIFIED TYPE: ICD-10-CM

## 2021-11-02 RX ORDER — ZOLPIDEM TARTRATE 12.5 MG/1
12.5 TABLET, FILM COATED, EXTENDED RELEASE ORAL
Qty: 30 TABLET | Refills: 0 | Status: SHIPPED | OUTPATIENT
Start: 2021-11-02 | End: 2021-11-30 | Stop reason: SDUPTHER

## 2021-11-16 DIAGNOSIS — Z30.41 ENCOUNTER FOR SURVEILLANCE OF CONTRACEPTIVE PILLS: ICD-10-CM

## 2021-11-16 RX ORDER — NORETHINDRONE ACETATE AND ETHINYL ESTRADIOL, ETHINYL ESTRADIOL AND FERROUS FUMARATE 1MG-10(24)
1 KIT ORAL DAILY
Qty: 84 TABLET | Refills: 2 | Status: SHIPPED | OUTPATIENT
Start: 2021-11-16 | End: 2022-08-08

## 2021-11-30 DIAGNOSIS — G47.00 INSOMNIA, UNSPECIFIED TYPE: ICD-10-CM

## 2021-11-30 RX ORDER — ZOLPIDEM TARTRATE 12.5 MG/1
12.5 TABLET, FILM COATED, EXTENDED RELEASE ORAL
Qty: 30 TABLET | Refills: 0 | Status: SHIPPED | OUTPATIENT
Start: 2021-11-30 | End: 2022-01-03 | Stop reason: SDUPTHER

## 2022-01-03 DIAGNOSIS — G47.00 INSOMNIA, UNSPECIFIED TYPE: ICD-10-CM

## 2022-01-04 RX ORDER — ZOLPIDEM TARTRATE 12.5 MG/1
12.5 TABLET, FILM COATED, EXTENDED RELEASE ORAL
Qty: 30 TABLET | Refills: 0 | Status: SHIPPED | OUTPATIENT
Start: 2022-01-04 | End: 2022-01-28 | Stop reason: SDUPTHER

## 2022-01-04 NOTE — TELEPHONE ENCOUNTER
Will send script please advise patient that she should not take tramadol and Ambien together I see she was given a script on 12/30

## 2022-01-28 DIAGNOSIS — G47.00 INSOMNIA, UNSPECIFIED TYPE: ICD-10-CM

## 2022-02-02 RX ORDER — ZOLPIDEM TARTRATE 12.5 MG/1
12.5 TABLET, FILM COATED, EXTENDED RELEASE ORAL
Qty: 30 TABLET | Refills: 0 | Status: SHIPPED | OUTPATIENT
Start: 2022-02-02 | End: 2022-03-02 | Stop reason: SDUPTHER

## 2022-02-02 NOTE — TELEPHONE ENCOUNTER
Patient called today  She has 1 Zolpidem for tonight and needs to  refill tomorrow  Please send to [facility] today

## 2022-02-28 DIAGNOSIS — G47.00 INSOMNIA, UNSPECIFIED TYPE: ICD-10-CM

## 2022-03-02 DIAGNOSIS — G47.00 INSOMNIA, UNSPECIFIED TYPE: ICD-10-CM

## 2022-03-02 RX ORDER — ZOLPIDEM TARTRATE 12.5 MG/1
12.5 TABLET, FILM COATED, EXTENDED RELEASE ORAL
Qty: 30 TABLET | Refills: 0 | Status: SHIPPED | OUTPATIENT
Start: 2022-03-02 | End: 2022-03-29 | Stop reason: SDUPTHER

## 2022-03-02 RX ORDER — ZOLPIDEM TARTRATE 12.5 MG/1
12.5 TABLET, FILM COATED, EXTENDED RELEASE ORAL
Qty: 30 TABLET | Refills: 0 | Status: CANCELLED | OUTPATIENT
Start: 2022-03-02

## 2022-03-02 NOTE — TELEPHONE ENCOUNTER
lmom for pt need to know what [facility] [place] she would like her [facility]pidem called into  Spoke with pt   Nov appt with deepika navarro [facility]

## 2022-03-29 DIAGNOSIS — G47.00 INSOMNIA, UNSPECIFIED TYPE: ICD-10-CM

## 2022-03-29 NOTE — TELEPHONE ENCOUNTER
PER PHARMACIST, LAST FILLED AT [facility] 03/02/2022 QTY 30           LOV 10/01/2021  NOV 10/03/2022    Hold until 04/01/2022    BONNIEOM

## 2022-04-01 RX ORDER — ZOLPIDEM TARTRATE 12.5 MG/1
12.5 TABLET, FILM COATED, EXTENDED RELEASE ORAL
Qty: 30 TABLET | Refills: 0 | Status: SHIPPED | OUTPATIENT
Start: 2022-04-01 | End: 2022-04-26 | Stop reason: SDUPTHER

## 2022-04-26 DIAGNOSIS — G47.00 INSOMNIA, UNSPECIFIED TYPE: ICD-10-CM

## 2022-04-26 NOTE — TELEPHONE ENCOUNTER
Patient is Requesting Rx refill for zolpidem (AMBIEN CR) 12 5 MG CR tablet       Please send to [address] [place] [address]- 10 1 21  NOV- 10 3 22

## 2022-04-29 RX ORDER — ZOLPIDEM TARTRATE 12.5 MG/1
12.5 TABLET, FILM COATED, EXTENDED RELEASE ORAL
Qty: 30 TABLET | Refills: 0 | Status: SHIPPED | OUTPATIENT
Start: 2022-04-29 | End: 2022-05-31 | Stop reason: SDUPTHER

## 2022-05-31 DIAGNOSIS — G47.00 INSOMNIA, UNSPECIFIED TYPE: ICD-10-CM

## 2022-05-31 RX ORDER — ZOLPIDEM TARTRATE 12.5 MG/1
12.5 TABLET, FILM COATED, EXTENDED RELEASE ORAL
Qty: 30 TABLET | Refills: 0 | Status: SHIPPED | OUTPATIENT
Start: 2022-05-31 | End: 2022-06-27 | Stop reason: SDUPTHER

## 2022-06-27 DIAGNOSIS — G47.00 INSOMNIA, UNSPECIFIED TYPE: ICD-10-CM

## 2022-06-27 RX ORDER — ZOLPIDEM TARTRATE 12.5 MG/1
12.5 TABLET, FILM COATED, EXTENDED RELEASE ORAL
Qty: 30 TABLET | Refills: 0 | Status: SHIPPED | OUTPATIENT
Start: 2022-06-27 | End: 2022-07-28 | Stop reason: SDUPTHER

## 2022-06-30 ENCOUNTER — TELEPHONE (OUTPATIENT)
Dept: INTERNAL MEDICINE CLINIC | Facility: CLINIC | Age: 52
End: 2022-06-30

## 2022-06-30 NOTE — TELEPHONE ENCOUNTER
Rx refill zolpidem (AMBIEN CR) 12 5 MG CR tablet  Send to [address]- 10/1/21  NOV- 10/3/22    Patient stated she has runned out however she is not due for a refill yet, she is okay with paying for an extra pill  Please advise and give her a call when you can

## 2022-07-28 DIAGNOSIS — G47.00 INSOMNIA, UNSPECIFIED TYPE: ICD-10-CM

## 2022-07-28 RX ORDER — ZOLPIDEM TARTRATE 12.5 MG/1
12.5 TABLET, FILM COATED, EXTENDED RELEASE ORAL
Qty: 30 TABLET | Refills: 0 | Status: SHIPPED | OUTPATIENT
Start: 2022-07-28 | End: 2022-08-29 | Stop reason: SDUPTHER

## 2022-07-28 NOTE — TELEPHONE ENCOUNTER
I will refill this medication 1 more time however for controlled substances patient typically needs to be seen every 4-6 months patient has not been seen since October please set up follow-up  Also patient needs controlled substance agreement on file  I have not seen this patient before

## 2022-08-08 DIAGNOSIS — Z30.41 ENCOUNTER FOR SURVEILLANCE OF CONTRACEPTIVE PILLS: ICD-10-CM

## 2022-08-08 RX ORDER — NORETHINDRONE ACETATE AND ETHINYL ESTRADIOL, ETHINYL ESTRADIOL AND FERROUS FUMARATE 1MG-10(24)
KIT ORAL
Qty: 84 TABLET | Refills: 2 | Status: SHIPPED | OUTPATIENT
Start: 2022-08-08

## 2022-08-17 ENCOUNTER — TELEMEDICINE (OUTPATIENT)
Dept: INTERNAL MEDICINE CLINIC | Facility: OTHER | Age: 52
End: 2022-08-17
Payer: COMMERCIAL

## 2022-08-17 ENCOUNTER — TELEPHONE (OUTPATIENT)
Dept: OTHER | Facility: OTHER | Age: 52
End: 2022-08-17

## 2022-08-17 VITALS — WEIGHT: 184 LBS | BODY MASS INDEX: 29.57 KG/M2 | HEIGHT: 66 IN | TEMPERATURE: 99 F

## 2022-08-17 DIAGNOSIS — U07.1 COVID-19: Primary | ICD-10-CM

## 2022-08-17 PROCEDURE — 3725F SCREEN DEPRESSION PERFORMED: CPT | Performed by: NURSE PRACTITIONER

## 2022-08-17 PROCEDURE — 99213 OFFICE O/P EST LOW 20 MIN: CPT | Performed by: NURSE PRACTITIONER

## 2022-08-17 RX ORDER — DICLOFENAC SODIUM 75 MG/1
TABLET, DELAYED RELEASE ORAL
COMMUNITY
Start: 2022-08-05

## 2022-08-17 NOTE — LETTER
[facility] [address] [address]  Dept: [phone]    August 17, 2022    Patient: Rober Mercedes  YOB: 1970    Rober Mercedes was seen and evaluated at our [facility]  She tested positive for Covid, she may return to work on 8/22/21, as this is 5 days from the onset of symptoms  Upon return, they must then adhere to strict masking for an additional 5 days      Sincerely,    THOMAS Laughlin

## 2022-08-17 NOTE — TELEPHONE ENCOUNTER
Pt took a home Covid test and is Pos  She has a headache, chills, fever , runny nose and would like a VV to discuss Paxlovid

## 2022-08-17 NOTE — PROGRESS NOTES
COVID-19 Outpatient Progress Note    Assessment/Plan:    Problem List Items Addressed This Visit        Other    COVID-19 - Primary         Disposition:     Patient has COVID-19 infection  Based off CDC guidelines, they were recommended to isolate for 5 days from the date of the positive test  If they remain asymptomatic, isolation may be ended followed by 5 days of wearing a mask when around othes to minimize risk of infecting others  If they have a fever, continue to stay home until fever resolves for at least 24 hours  Discussed symptom directed medication options with patient  Discussed vitamin D, vitamin C, and/or zinc supplementation with patient  Illness appears to be viral in nature  Rest and fluids advised  Educated that the course of this illness could be 2-4 weeks  Discussed symptomatic relief, such as warm steam inhalations, tylenol/ibuprofen for fevers and body aches, rest, and drink plenty of fluids  Warm salt gargles for sore throat  Discussed red flag signs to go to the ER, such as chest pain or shortness of breath  Return to the office for reevaluation if symptoms worsen or do not improve in 1-2 weeks       I have spent 15 minutes directly with the patient  Encounter provider THOMAS Weir    Provider located at [address] [place] [address]    Recent Visits  No visits were found meeting these conditions  Showing recent visits within past 7 days and meeting all other requirements  Today's Visits  Date Type Provider Dept   08/17/22 [place], CRNP [facility]   Showing today's visits and meeting all other requirements  Future Appointments  No visits were found meeting these conditions    Showing future appointments within next 150 days and meeting all other requirements       The patient was identified by name and date of birth  Suman Lewis was informed that this is a telemedicine visit and that the visit is being conducted through [address] [place] [address] Road [employer] and patient was informed that this is a secure, HIPAA-compliant platform  She agrees to proceed     My office door was closed  No one else was in the room  She acknowledged consent and understanding of privacy and security of the video platform  The patient has agreed to participate and understands they can discontinue the visit at any time  Patient is aware this is a billable service  This virtual check-in was done via [employer] and patient was informed that this is a secure, HIPAA-compliant platform  She agrees to proceed  Patient agrees to participate in a virtual check in via telephone or video visit instead of presenting to the office to address urgent/immediate medical needs  Patient is aware this is a billable service  After connecting through [facility], the patient was identified by name and date of birth  Suman Lewis was informed that this was a telemedicine visit and that the exam was being conducted confidentially over secure lines  My office door was closed  No one else was in the room  Suman Lewis acknowledged consent and understanding of privacy and security of the telemedicine visit  I informed the patient that I have reviewed her record in Epic and presented the opportunity for her to ask any questions regarding the visit today  The patient agreed to participate  Verification of patient location:  Patient is located in the following state in which I hold an active license: [place]    Subjective:   Suman Lewis is a 47 y o  female who has been screened for COVID-19  Symptom change since last report: unchanged  Patient's symptoms include fever, chills, fatigue, nasal congestion, cough, diarrhea, myalgias and headache   Patient denies malaise, rhinorrhea, sore throat, anosmia, loss of taste, shortness of breath, chest tightness, abdominal pain, nausea and vomiting      - Date of symptom onset: 8/16/2022  - Date of exposure: 8/14/2022  - Date of positive COVID-19 test: 8/17/2022  Type of test: Home antigen  COVID-19 vaccination status: Fully vaccinated (primary series)    Nery Contreras has been staying home and has isolated themselves in her home  She is taking care to not share personal items and is cleaning all surfaces that are touched often, like counters, tabletops, and doorknobs using household cleaning sprays or wipes  She is wearing a mask when she leaves her room  Tylenol    No results found for: Windy Randolph, SARSCORONAVI, CORONAVIRUSR, SARSCOVAG, [address]  Past Medical History:   Diagnosis Date    Adjustment disorder     Anxiety     Diabetes mellitus ([address] )     History of cocaine use     History of tobacco use     HLD (hyperlipidemia)     Lipoma of shoulder     PONV (postoperative nausea and vomiting)     Sesamoiditis 3/30/2020    Severe cervical dysplasia 6/16/2017    Tobacco dependence syndrome      Past Surgical History:   Procedure Laterality Date    BREAST SURGERY      Augment   CERVICAL BIOPSY N/A 6/16/2017    Procedure: CONE BIOPSY;  Surgeon: Hattie Elias MD;  [facility];  Service: Gynecology    EXPLORATORY LAPAROTOMY      Age 17, ovarian cysts    MAMMO (HISTORICAL)      NECK SURGERY      Last Assessed 7/14/2016    SCAPULAR MASS EXCISION Left     lipoma     Current Outpatient Medications   Medication Sig Dispense Refill    diclofenac (VOLTAREN) 75 mg EC tablet TAKE 1 TABLET BY MOUTH 2 TIMES A DAY WITH MEALS   Lo Loestrin Fe 1 MG-10 MCG / 10 MCG TABS TAKE 1 TABLET BY MOUTH EVERY DAY 84 tablet 2    zolpidem (AMBIEN CR) 12 5 MG CR tablet Take 1 tablet (12 5 mg total) by mouth daily at bedtime as needed for sleep 30 tablet 0     No current facility-administered medications for this visit       Allergies   Allergen Reactions    Morphine GI Intolerance and Other (See Comments)     vomiting       Review of Systems   Constitutional: Positive for chills, fatigue and fever  Negative for activity change, appetite change and diaphoresis  HENT: Positive for congestion  Negative for ear discharge, ear pain, postnasal drip, rhinorrhea, sinus pressure, sinus pain and sore throat  Eyes: Negative for pain, discharge, itching and visual disturbance  Respiratory: Positive for cough  Negative for chest tightness, shortness of breath and wheezing  Cardiovascular: Negative for chest pain, palpitations and leg swelling  Gastrointestinal: Positive for diarrhea  Negative for abdominal pain, constipation, nausea and vomiting  Endocrine: Negative for polydipsia, polyphagia and polyuria  Genitourinary: Negative for difficulty urinating, dysuria and urgency  Musculoskeletal: Positive for myalgias  Negative for arthralgias, back pain and neck pain  Skin: Negative for rash and wound  Neurological: Positive for headaches  Negative for dizziness, weakness and numbness  Objective:    Vitals:    08/17/22 1659   Temp: 99 °F (37 2 °C)   TempSrc: Oral   Weight: 83 5 kg (184 lb)   Height: 5' 6" (1 676 m)       Physical Exam  Constitutional:       Appearance: Normal appearance  She is not ill-appearing  Eyes:      General: No scleral icterus  Pulmonary:      Effort: No respiratory distress  Neurological:      Mental Status: She is alert     Psychiatric:         Mood and Affect: Mood normal          Behavior: Behavior normal

## 2022-08-19 ENCOUNTER — TELEMEDICINE (OUTPATIENT)
Dept: INTERNAL MEDICINE CLINIC | Facility: CLINIC | Age: 52
End: 2022-08-19
Payer: COMMERCIAL

## 2022-08-19 VITALS — TEMPERATURE: 98.4 F

## 2022-08-19 DIAGNOSIS — Z12.11 SCREENING FOR COLON CANCER: ICD-10-CM

## 2022-08-19 DIAGNOSIS — E78.49 OTHER HYPERLIPIDEMIA: ICD-10-CM

## 2022-08-19 DIAGNOSIS — Z12.12 ENCOUNTER FOR COLORECTAL CANCER SCREENING: Primary | ICD-10-CM

## 2022-08-19 DIAGNOSIS — Z12.31 BREAST CANCER SCREENING BY MAMMOGRAM: ICD-10-CM

## 2022-08-19 DIAGNOSIS — F51.01 PRIMARY INSOMNIA: ICD-10-CM

## 2022-08-19 DIAGNOSIS — U07.1 COVID-19: ICD-10-CM

## 2022-08-19 DIAGNOSIS — Z12.11 ENCOUNTER FOR COLORECTAL CANCER SCREENING: Primary | ICD-10-CM

## 2022-08-19 DIAGNOSIS — F41.9 ANXIETY: ICD-10-CM

## 2022-08-19 DIAGNOSIS — R73.01 IMPAIRED FASTING GLUCOSE: ICD-10-CM

## 2022-08-19 PROCEDURE — 99214 OFFICE O/P EST MOD 30 MIN: CPT | Performed by: NURSE PRACTITIONER

## 2022-08-19 RX ORDER — PREDNISONE 20 MG/1
40 TABLET ORAL DAILY
Qty: 10 TABLET | Refills: 0 | Status: SHIPPED | OUTPATIENT
Start: 2022-08-19 | End: 2022-08-24

## 2022-08-19 NOTE — ASSESSMENT & PLAN NOTE
Have tried multiple agents in the past  Continue zolpidem as ordered  Needs controlled substance agreement on file

## 2022-08-19 NOTE — ASSESSMENT & PLAN NOTE
Recommend healthy lifestyle choices for your cholesterol  Low fat/low cholesterol diet  Limit/avoid red meat  Eat more lean meat - chicken breast, ground turkey, fish  Exercise 30 mins at least 5 times a week as tolerated  Due for updated lipid panel

## 2022-08-19 NOTE — PROGRESS NOTES
Assessment/Plan:    Impaired fasting glucose  Elevated at last check  Recheck  Cont ADA diet  Attempts at weight loss, exercise    Anxiety  Overall improved with change in jobs  No longer takes klonopin     Primary insomnia  Have tried multiple agents in the past  Continue zolpidem as ordered  Needs controlled substance agreement on file    COVID-19  Advised patient to start Mucinex and start prednisone  Illness appears to be viral in nature  Rest and fluids advised  Educated that the course of this illness could be 2-4 weeks  Discussed symptomatic relief, such as warm steam inhalations, tylenol/ibuprofen for fevers and body aches, rest, and drink plenty of fluids  Warm salt gargles for sore throat  Discussed red flag signs to go to the ER, such as chest pain or shortness of breath  Return to the office for reevaluation if symptoms worsen or do not improve in 1-2 weeks      Other hyperlipidemia  Recommend healthy lifestyle choices for your cholesterol  Low fat/low cholesterol diet  Limit/avoid red meat  Eat more lean meat - chicken breast, ground turkey, fish  Exercise 30 mins at least 5 times a week as tolerated  Due for updated lipid panel  BMI Counseling: There is no height or weight on file to calculate BMI  The BMI is above normal  Nutrition recommendations include decreasing portion sizes, encouraging healthy choices of fruits and vegetables, decreasing fast food intake, consuming healthier snacks, limiting drinks that contain sugar, moderation in carbohydrate intake, increasing intake of lean protein, reducing intake of saturated and trans fat and reducing intake of cholesterol  Exercise recommendations include moderate physical activity 150 minutes/week and exercising 3-5 times per week  Rationale for BMI follow-up plan is due to patient being overweight or obese  Tobacco Cessation Counseling: Tobacco cessation counseling was provided   The patient is sincerely urged to quit consumption of tobacco  She is not ready to quit tobacco  Medication options and side effects of medication discussed  Patient refused medication  Diagnoses and all orders for this visit:    Encounter for colorectal cancer screening    COVID-19  -     predniSONE 20 mg tablet; Take 2 tablets (40 mg total) by mouth daily for 5 days    Screening for colon cancer  -     Cologuard    Breast cancer screening by mammogram  -     Mammo screening bilateral w 3d & cad; Future    Other hyperlipidemia    Impaired fasting glucose    Anxiety    Primary insomnia          Subjective:      Patient ID: Mike Swanson is a 47 y o  female  Patient presents today to follow-up on chronic conditions  She currently has COVID and reports that her symptoms have worsened slightly since Wednesday, she reports sinus congestion and productive cough, denies shortness of breath mentions today  Denies fevers and chills  However does report body aches  Anxiety- currently controlled off medication       Primary insomnia-controlled with Ambien, have tried different sleep aids in the past with no relief    Hyperlipidemia-due for lipid panel, currently not on medication          The following portions of the patient's history were reviewed and updated as appropriate: allergies, current medications, past family history, past medical history, past social history, past surgical history and problem list     Review of Systems   Constitutional: Negative for activity change, appetite change, chills, diaphoresis and fever  HENT: Positive for congestion  Negative for ear discharge, ear pain, postnasal drip, rhinorrhea, sinus pressure, sinus pain and sore throat  Eyes: Negative for pain, discharge, itching and visual disturbance  Respiratory: Negative for cough, chest tightness, shortness of breath and wheezing  Cardiovascular: Negative for chest pain, palpitations and leg swelling     Gastrointestinal: Negative for abdominal pain, constipation, diarrhea, nausea and vomiting  Endocrine: Negative for polydipsia, polyphagia and polyuria  Genitourinary: Negative for difficulty urinating, dysuria and urgency  Musculoskeletal: Positive for myalgias  Negative for arthralgias, back pain and neck pain  Skin: Negative for rash and wound  Neurological: Negative for dizziness, weakness, numbness and headaches  Past Medical History:   Diagnosis Date    Adjustment disorder     Anxiety     Diabetes mellitus ([address] )     History of cocaine use     History of tobacco use     HLD (hyperlipidemia)     Lipoma of shoulder     PONV (postoperative nausea and vomiting)     Sesamoiditis 3/30/2020    Severe cervical dysplasia 6/16/2017    Tobacco dependence syndrome          Current Outpatient Medications:     diclofenac (VOLTAREN) 75 mg EC tablet, TAKE 1 TABLET BY MOUTH 2 TIMES A DAY WITH MEALS , Disp: , Rfl:     Lo Loestrin Fe 1 MG-10 MCG / 10 MCG TABS, TAKE 1 TABLET BY MOUTH EVERY DAY, Disp: 84 tablet, Rfl: 2    predniSONE 20 mg tablet, Take 2 tablets (40 mg total) by mouth daily for 5 days, Disp: 10 tablet, Rfl: 0    zolpidem (AMBIEN CR) 12 5 MG CR tablet, Take 1 tablet (12 5 mg total) by mouth daily at bedtime as needed for sleep, Disp: 30 tablet, Rfl: 0    Allergies   Allergen Reactions    Morphine GI Intolerance and Other (See Comments)     vomiting       Social History   Past Surgical History:   Procedure Laterality Date    BREAST SURGERY      Augment       CERVICAL BIOPSY N/A 6/16/2017    Procedure: CONE BIOPSY;  Surgeon: Sarah Arce MD;  [facility];  Service: Gynecology    EXPLORATORY LAPAROTOMY      Age 17, ovarian cysts    MAMMO (HISTORICAL)      NECK SURGERY      Last Assessed 7/14/2016    SCAPULAR MASS EXCISION Left     lipoma     Family History   Problem Relation Age of Onset    Other Mother         No Pertinent Family History    Other Father         No Pertinent Family History Objective:  Temp 98 4 °F (36 9 °C)     No results found for this or any previous visit (from the past 1344 hour(s))  Physical Exam  Constitutional:       General: She is not in acute distress  HENT:      Mouth/Throat:      Pharynx: No oropharyngeal exudate  Eyes:      General: No scleral icterus  Pulmonary:      Effort: No respiratory distress  Neurological:      Mental Status: She is alert and oriented to person, place, and time  Psychiatric:         Behavior: Behavior normal          Thought Content:  Thought content normal          Judgment: Judgment normal

## 2022-08-19 NOTE — ASSESSMENT & PLAN NOTE
Advised patient to start Mucinex and start prednisone  Illness appears to be viral in nature  Rest and fluids advised  Educated that the course of this illness could be 2-4 weeks  Discussed symptomatic relief, such as warm steam inhalations, tylenol/ibuprofen for fevers and body aches, rest, and drink plenty of fluids  Warm salt gargles for sore throat  Discussed red flag signs to go to the ER, such as chest pain or shortness of breath     Return to the office for reevaluation if symptoms worsen or do not improve in 1-2 weeks

## 2022-08-29 DIAGNOSIS — G47.00 INSOMNIA, UNSPECIFIED TYPE: ICD-10-CM

## 2022-08-29 RX ORDER — ZOLPIDEM TARTRATE 12.5 MG/1
12.5 TABLET, FILM COATED, EXTENDED RELEASE ORAL
Qty: 30 TABLET | Refills: 0 | Status: SHIPPED | OUTPATIENT
Start: 2022-08-29 | End: 2022-09-26 | Stop reason: SDUPTHER

## 2022-09-19 ENCOUNTER — TELEPHONE (OUTPATIENT)
Dept: INTERNAL MEDICINE CLINIC | Age: 52
End: 2022-09-19

## 2022-09-26 DIAGNOSIS — G47.00 INSOMNIA, UNSPECIFIED TYPE: ICD-10-CM

## 2022-09-26 NOTE — TELEPHONE ENCOUNTER
Received notification patient's Vitamin D lab was denied by insurance for a non-covered diagnosis  Diagnosis Used:  Z00 00-Encounter for General Adult Medical Examination without Abnormal findings  Please advise if there is another diagnosis code that can be used for coverage/resubmission

## 2022-09-26 NOTE — TELEPHONE ENCOUNTER
Rx refill for zolpidem (AMBIEN CR) 12 5 MG CR tablet      LOV- 8/19/22  NOV- 10/3/22    Send to [facility]/pharmacy #[id number] [address] [place] [address]

## 2022-09-27 RX ORDER — ZOLPIDEM TARTRATE 12.5 MG/1
12.5 TABLET, FILM COATED, EXTENDED RELEASE ORAL
Qty: 30 TABLET | Refills: 0 | Status: SHIPPED | OUTPATIENT
Start: 2022-09-27 | End: 2022-10-26 | Stop reason: SDUPTHER

## 2022-10-03 ENCOUNTER — TELEPHONE (OUTPATIENT)
Dept: INTERNAL MEDICINE CLINIC | Age: 52
End: 2022-10-03

## 2022-10-03 ENCOUNTER — OFFICE VISIT (OUTPATIENT)
Dept: INTERNAL MEDICINE CLINIC | Age: 52
End: 2022-10-03
Payer: COMMERCIAL

## 2022-10-03 VITALS
HEART RATE: 97 BPM | BODY MASS INDEX: 30.05 KG/M2 | TEMPERATURE: 99.1 F | WEIGHT: 187 LBS | OXYGEN SATURATION: 95 % | HEIGHT: 66 IN | DIASTOLIC BLOOD PRESSURE: 78 MMHG | SYSTOLIC BLOOD PRESSURE: 120 MMHG

## 2022-10-03 DIAGNOSIS — F17.210 CIGARETTE NICOTINE DEPENDENCE WITHOUT COMPLICATION: ICD-10-CM

## 2022-10-03 DIAGNOSIS — F51.01 PRIMARY INSOMNIA: Primary | ICD-10-CM

## 2022-10-03 PROCEDURE — 99214 OFFICE O/P EST MOD 30 MIN: CPT | Performed by: INTERNAL MEDICINE

## 2022-10-03 RX ORDER — VARENICLINE TARTRATE 1 MG/1
TABLET, FILM COATED ORAL
Qty: 158 TABLET | Refills: 0 | Status: SHIPPED | OUTPATIENT
Start: 2022-10-03

## 2022-10-03 NOTE — PROGRESS NOTES
Assessment/Plan:    Primary insomnia  -I explained to patient that Ambien CR is meant for short-term treatment of insomnia and that she should be on a lower dose  -she needs to be tapered off Ambien and she needs to be trialed on other sleep medications  I will hold off on doing this and have her follow-up with her PCP to complete this transition   -the opioid agreement from was signed today  -she does not need a refill of Ambien today and will follow-up with her PCP in 3 months    Nicotine dependence  -patient was counseled to quit smoking  -she is willing to stop and we will start her on Chantix   -she will take half tablet daily for 3 days, followed by half a tablet twice daily for 4 days and then 1 tablet twice daily for 11 weeks  She was counseled to set a quit date and to quit smoking around week 5  Diagnoses and all orders for this visit:    Primary insomnia    Cigarette nicotine dependence without complication  -     varenicline (CHANTIX) 1 mg tablet; Take 1/2  Tab daily for 3 days, then 1/2 tab twice daily for 4 days and then 1 tab twice daily for 11 weeks           Subjective:      Patient ID: Sonal Prakash is a 47 y o  female  HPI  Pt presents to complete the opioid agreement  She follows with Araceli Rosado and states that she sees her once a year  She is on Ambien CR 12 5 mg and states that she has been taking it for several years  She takes it every single day night before bed  She does not know when her appointment is with her pcp  She was on a lower dose of ambien but states that she would wake up and not be able to go back to sleep and they increased the dose and kept increasing it  She states that now she has an issue with waking up to urinate and cannot fall back asleep even on the 12 5 mg and wakes up very tired  She denies sleepwalking or any complex sleep related behaviors    She finds it difficult to fall asleep and to stay asleep  She only tried otc meds in the past  She denies any side effects to Ambien  Had 2 vag deliveries  She eats last at 6 pm  Goes to bed about 10 pm  Drinks a lot of water through out the day  Drinks 2 cups of caffeine daily, but drinks them in the morning  Has been smoking for about 14-15 years and quit for three years with chantix and max of a pack a day  Would like to quit and would like a prescription for Chantix again  The following portions of the patient's history were reviewed and updated as appropriate:   She  has a past medical history of Adjustment disorder, Anxiety, Diabetes mellitus ([address] ), History of cocaine use, History of tobacco use, HLD (hyperlipidemia), Lipoma of shoulder, PONV (postoperative nausea and vomiting), Sesamoiditis (3/30/2020), Severe cervical dysplasia (6/16/2017), and Tobacco dependence syndrome  She   Patient Active Problem List    Diagnosis Date Noted    Cigarette nicotine dependence without complication 08/67/0453    Other hyperlipidemia 08/19/2022    COVID-19 08/17/2022    Impaired fasting glucose 10/01/2021    Overweight (BMI 25 0-29 9) 10/01/2021    Anxiety 09/29/2021    Primary insomnia 09/29/2021    Neck pain 04/06/2018     She  has a past surgical history that includes Cervical biopsy (N/A, 6/16/2017); Exploratory laparotomy; Neck surgery; Mammo (historical); Scapular mass excision (Left); and Breast surgery  Her family history includes Other in her father and mother  She  reports that she has been smoking cigarettes  She has a 5 00 pack-year smoking history  She has never used smokeless tobacco  She reports current alcohol use  She reports that she does not use drugs  Current Outpatient Medications   Medication Sig Dispense Refill    diclofenac (VOLTAREN) 75 mg EC tablet TAKE 1 TABLET BY MOUTH 2 TIMES A DAY WITH MEALS        Lo Loestrin Fe 1 MG-10 MCG / 10 MCG TABS TAKE 1 TABLET BY MOUTH EVERY DAY 84 tablet 2    varenicline (CHANTIX) 1 mg tablet Take 1/2  Tab daily for 3 days, then 1/2 tab twice daily for 4 days and then 1 tab twice daily for 11 weeks 158 tablet 0    zolpidem (AMBIEN CR) 12 5 MG CR tablet Take 1 tablet (12 5 mg total) by mouth daily at bedtime as needed for sleep 30 tablet 0     No current facility-administered medications for this visit  Current Outpatient Medications on File Prior to Visit   Medication Sig    diclofenac (VOLTAREN) 75 mg EC tablet TAKE 1 TABLET BY MOUTH 2 TIMES A DAY WITH MEALS   Lo Loestrin Fe 1 MG-10 MCG / 10 MCG TABS TAKE 1 TABLET BY MOUTH EVERY DAY    zolpidem (AMBIEN CR) 12 5 MG CR tablet Take 1 tablet (12 5 mg total) by mouth daily at bedtime as needed for sleep     No current facility-administered medications on file prior to visit  She is allergic to morphine       Review of Systems   Constitutional: Negative for activity change, chills, fatigue, fever and unexpected weight change  HENT: Negative for ear pain, postnasal drip, rhinorrhea, sinus pressure and sore throat  Eyes: Negative for pain  Respiratory: Negative for cough, choking, chest tightness, shortness of breath and wheezing  Cardiovascular: Negative for chest pain, palpitations and leg swelling  Gastrointestinal: Negative for abdominal pain, constipation, diarrhea, nausea and vomiting  Genitourinary: Positive for frequency (nocturia x 2 )  Negative for dysuria and hematuria  Musculoskeletal: Negative for arthralgias, back pain, gait problem, joint swelling, myalgias and neck stiffness  Skin: Negative for pallor and rash  Neurological: Negative for dizziness, tremors, seizures, syncope, light-headedness and headaches  Hematological: Negative for adenopathy  Psychiatric/Behavioral: Positive for sleep disturbance  Negative for behavioral problems           Objective:      /78 (BP Location: Left arm, Patient Position: Sitting, Cuff Size: Standard)   Pulse 97   Temp 99 1 °F (37 3 °C) (Temporal)   Ht 5' 6" (1 676 m)   Wt 84 8 kg (187 lb)   SpO2 95% BMI 30 18 kg/m²          Physical Exam  Constitutional:       General: She is not in acute distress  Appearance: She is well-developed  She is not diaphoretic  HENT:      Head: Normocephalic and atraumatic  Right Ear: External ear normal       Left Ear: External ear normal       Nose: Nose normal       Mouth/Throat:      Mouth: Mucous membranes are dry  Pharynx: Posterior oropharyngeal erythema present  No oropharyngeal exudate  Eyes:      General: No scleral icterus  Right eye: No discharge  Left eye: No discharge  Conjunctiva/sclera: Conjunctivae normal       Pupils: Pupils are equal, round, and reactive to light  Neck:      Thyroid: No thyromegaly  Vascular: No JVD  Trachea: No tracheal deviation  Cardiovascular:      Rate and Rhythm: Normal rate and regular rhythm  Heart sounds: Normal heart sounds  No murmur heard  No friction rub  No gallop  Pulmonary:      Effort: Pulmonary effort is normal  No respiratory distress  Breath sounds: Normal breath sounds  No wheezing or rales  Chest:      Chest wall: No tenderness  Abdominal:      General: Bowel sounds are normal  There is no distension  Palpations: Abdomen is soft  There is no mass  Tenderness: There is no abdominal tenderness  There is no guarding or rebound  Musculoskeletal:         General: No tenderness or deformity  Normal range of motion  Cervical back: Normal range of motion and neck supple  Lymphadenopathy:      Cervical: No cervical adenopathy  Skin:     General: Skin is warm and dry  Coloration: Skin is not pale  Findings: No erythema or rash  Neurological:      Mental Status: She is alert and oriented to person, place, and time  Cranial Nerves: No cranial nerve deficit  Motor: No abnormal muscle tone  Coordination: Coordination normal       Deep Tendon Reflexes: Reflexes are normal and symmetric     Psychiatric:         Behavior: Behavior normal            No visits with results within 12 Month(s) from this visit  Latest known visit with results is:   Annual Exam on 03/09/2021   Component Date Value Ref Range Status    SL AMB CLINICAL INFORMATION 03/09/2021 None given   Final    LMP 03/09/2021 NONE GIVEN   Final    Prev  PAP 03/09/2021 NONE GIVEN   Final    Prev  BX 03/09/2021 NONE GIVEN   Final    Source 03/09/2021 Cervix   Final    Statement of Adequacy 03/09/2021    Final    Comment: Satisfactory for evaluation  Endocervical/transformation zone component  present  Age and/or menstrual status not provided      Interpretation/Result 03/09/2021 Negative for intraepithelial lesion or malignancy  Final    Infection 03/09/2021 Fungal organisms morphologically consistent with Candida spp  Final    Comment 03/09/2021 This Pap test has been evaluated with computer assisted technology  Final    SL AMB CYTOTECHNOLOGIST: 03/09/2021    Final    Comment: Noy Check CT (ASCP)  CT Screening Location: [place] [address]      Comment 03/09/2021    Final    Comment: EXPLANATORY NOTE:      The Pap is a screening test for cervical cancer  It is   not a diagnostic test and is subject to false negative   and false positive results  It is most reliable when a   satisfactory sample, regularly obtained, is submitted   with relevant clinical findings and history, and when   the Pap result is evaluated along with historic and   current clinical information   HPV mRNA E6/E7 03/09/2021 Not Detected  Not Detected Final    Comment: Methodology: Transcription-Mediated Amplification  This assay detects E6/E7 viral messenger RNA (mRNA) from 14  high-risk HPV types (16,18,31,33,35,39,45,51,52,56,58,59,66,68)  The analytical performance characteristics of this  assay have been determined by [facility]  The modifications have not been cleared or approved  by the FDA   This assay has been validated pursuant  to the CLIA regulations and is used for  clinical purposes  For additional information, please refer to  http://[facility]/faq/[email or web address]  (This link if provided for information/  educational purposes only )

## 2022-10-03 NOTE — TELEPHONE ENCOUNTER
Patient will call to make her 3 month appointment with Niki Perkins  She didn't want to schedule one when leaving    She was waiting so long for Dr Geno Julian that she had to get home

## 2022-10-19 NOTE — TELEPHONE ENCOUNTER
Per Rock Muller, try code [id number]  Updated form with new code and faxed to [facility] @ [phone]  Gave completed form to  for scanning

## 2022-10-26 DIAGNOSIS — G47.00 INSOMNIA, UNSPECIFIED TYPE: ICD-10-CM

## 2022-10-27 ENCOUNTER — TELEMEDICINE (OUTPATIENT)
Dept: INTERNAL MEDICINE CLINIC | Facility: CLINIC | Age: 52
End: 2022-10-27

## 2022-10-27 DIAGNOSIS — J01.00 ACUTE MAXILLARY SINUSITIS, RECURRENCE NOT SPECIFIED: Primary | ICD-10-CM

## 2022-10-27 RX ORDER — ZOLPIDEM TARTRATE 12.5 MG/1
12.5 TABLET, FILM COATED, EXTENDED RELEASE ORAL
Qty: 30 TABLET | Refills: 0 | Status: SHIPPED | OUTPATIENT
Start: 2022-10-27

## 2022-10-27 RX ORDER — MELOXICAM 15 MG/1
15 TABLET ORAL DAILY
COMMUNITY
Start: 2022-10-21

## 2022-10-27 RX ORDER — AMOXICILLIN AND CLAVULANATE POTASSIUM 875; 125 MG/1; MG/1
1 TABLET, FILM COATED ORAL EVERY 12 HOURS SCHEDULED
Qty: 20 TABLET | Refills: 0 | Status: SHIPPED | OUTPATIENT
Start: 2022-10-27 | End: 2022-11-06

## 2022-10-27 NOTE — ASSESSMENT & PLAN NOTE
Will start Augementin  Advised patient to get retested tomorrow  Rest and fluids advised  Educated that the course of this illness could be 2-4 weeks  Discussed symptomatic relief, such as warm steam inhalations, tylenol/ibuprofen for fevers and body aches, rest, and drink plenty of fluids  Warm salt gargles for sore throat  Discussed red flag signs to go to the ER, such as chest pain or shortness of breath     Return to the office for reevaluation if symptoms worsen or do not improve in 1-2 weeks

## 2022-10-27 NOTE — PROGRESS NOTES
COVID-19 Outpatient Progress Note    Assessment/Plan:    Problem List Items Addressed This Visit        Respiratory    Acute maxillary sinusitis - Primary     Will start Augementin  Advised patient to get retested tomorrow  Rest and fluids advised  Educated that the course of this illness could be 2-4 weeks  Discussed symptomatic relief, such as warm steam inhalations, tylenol/ibuprofen for fevers and body aches, rest, and drink plenty of fluids  Warm salt gargles for sore throat  Discussed red flag signs to go to the ER, such as chest pain or shortness of breath  Return to the office for reevaluation if symptoms worsen or do not improve in 1-2 weeks            Relevant Medications    amoxicillin-clavulanate (AUGMENTIN) 875-125 mg per tablet         Disposition:           I have spent 15 minutes directly with the patient  Encounter provider: THOMAS Andersen     Provider located at: [address] [address]     Recent Visits  No visits were found meeting these conditions  Showing recent visits within past 7 days and meeting all other requirements  Today's Visits  Date Type Provider Dept   10/27/22 THOMAS Wren [facility]   Showing today's visits and meeting all other requirements  Future Appointments  No visits were found meeting these conditions  Showing future appointments within next 150 days and meeting all other requirements       Patient agrees to participate in a virtual check in via telephone or video visit instead of presenting to the office to address urgent/immediate medical needs  Patient is aware this is a billable service  She acknowledged consent and understanding of privacy and security of the video platform   The patient has agreed to participate and understands they can discontinue the visit at any time     After connecting through [facility], the patient was identified by name and date of birth  Naila Camara was informed that this was a telemedicine visit and that the exam was being conducted confidentially over secure lines  My office door was closed  No one else was in the room  Naila Camara acknowledged consent and understanding of privacy and security of the telemedicine visit  I informed the patient that I have reviewed her record in Epic and presented the opportunity for her to ask any questions regarding the visit today  The patient agreed to participate  Verification of patient location:  Patient is located in the following state in which I hold an active license: [place]    Subjective:   Naila Camara is a 47 y o  female who is concerned about COVID-19  Patient's symptoms include fatigue, nasal congestion and cough  Patient denies fever, chills, malaise, rhinorrhea, sore throat, anosmia, loss of taste, shortness of breath, chest tightness, abdominal pain, nausea, vomiting, diarrhea, myalgias and headaches       - Date of symptom onset: 10/25/2022      COVID-19 vaccination status: Fully vaccinated (primary series)    Exposure:   Contact with a person who is under investigation (PUI) for or who is positive for COVID-19 within the last 14 days?: No    Hospitalized recently for fever and/or lower respiratory symptoms?: No      Currently a healthcare worker that is involved in direct patient care?: No      Works in a special setting where the risk of COVID-19 transmission may be high? (this may include long-term care, correctional and snf facilities; homeless shelters; assisted-living facilities and group homes ): No      Resident in a special setting where the risk of COVID-19 transmission may be high? (this may include long-term care, correctional and snf facilities; homeless shelters; assisted-living facilities and group homes ): No        Denies sick contacts  At home covid test negative yesterday    No results found for: Elliott Catrinajasmin, SARSCORONAVI, CORONAVIRUSR, SARSCOVAG, [facility]    Review of Systems   Constitutional: Positive for fatigue  Negative for activity change, appetite change, chills, diaphoresis and fever  HENT: Positive for congestion  Negative for ear discharge, ear pain, postnasal drip, rhinorrhea, sinus pressure, sinus pain and sore throat  Eyes: Negative for pain, discharge, itching and visual disturbance  Respiratory: Positive for cough  Negative for chest tightness, shortness of breath and wheezing  Cardiovascular: Negative for chest pain, palpitations and leg swelling  Gastrointestinal: Negative for abdominal pain, constipation, diarrhea, nausea and vomiting  Endocrine: Negative for polydipsia, polyphagia and polyuria  Genitourinary: Negative for difficulty urinating, dysuria and urgency  Musculoskeletal: Negative for arthralgias, back pain, myalgias and neck pain  Skin: Negative for rash and wound  Neurological: Negative for dizziness, weakness, numbness and headaches  Current Outpatient Medications on File Prior to Visit   Medication Sig   • Lo Loestrin Fe 1 MG-10 MCG / 10 MCG TABS TAKE 1 TABLET BY MOUTH EVERY DAY   • varenicline (CHANTIX) 1 mg tablet Take 1/2  Tab daily for 3 days, then 1/2 tab twice daily for 4 days and then 1 tab twice daily for 11 weeks   • diclofenac (VOLTAREN) 75 mg EC tablet TAKE 1 TABLET BY MOUTH 2 TIMES A DAY WITH MEALS  (Patient not taking: Reported on 10/27/2022)   • meloxicam (MOBIC) 15 mg tablet Take 15 mg by mouth daily   • zolpidem (AMBIEN CR) 12 5 MG CR tablet Take 1 tablet (12 5 mg total) by mouth daily at bedtime as needed for sleep       Objective: There were no vitals taken for this visit  Physical Exam  Neurological:      Mental Status: She is alert and oriented to person, place, and time         THOMAS Horta

## 2022-11-23 DIAGNOSIS — G47.00 INSOMNIA, UNSPECIFIED TYPE: ICD-10-CM

## 2022-11-23 NOTE — TELEPHONE ENCOUNTER
LOV 10/27  TITI 10/3  NOV not scheduled   Sent message in [place] to scheduled follow up appointment in January,

## 2022-11-25 RX ORDER — ZOLPIDEM TARTRATE 12.5 MG/1
12.5 TABLET, FILM COATED, EXTENDED RELEASE ORAL
Qty: 30 TABLET | Refills: 0 | Status: SHIPPED | OUTPATIENT
Start: 2022-11-25

## 2022-12-23 DIAGNOSIS — G47.00 INSOMNIA, UNSPECIFIED TYPE: ICD-10-CM

## 2022-12-23 RX ORDER — ZOLPIDEM TARTRATE 12.5 MG/1
12.5 TABLET, FILM COATED, EXTENDED RELEASE ORAL
Qty: 30 TABLET | Refills: 0 | Status: SHIPPED | OUTPATIENT
Start: 2022-12-23

## 2022-12-26 PROBLEM — J01.00 ACUTE MAXILLARY SINUSITIS: Status: RESOLVED | Noted: 2022-10-27 | Resolved: 2022-12-26

## 2023-01-09 ENCOUNTER — TELEPHONE (OUTPATIENT)
Dept: INTERNAL MEDICINE CLINIC | Facility: CLINIC | Age: 53
End: 2023-01-09

## 2023-01-09 ENCOUNTER — TELEMEDICINE (OUTPATIENT)
Dept: INTERNAL MEDICINE CLINIC | Facility: CLINIC | Age: 53
End: 2023-01-09

## 2023-01-09 DIAGNOSIS — G56.03 BILATERAL CARPAL TUNNEL SYNDROME: ICD-10-CM

## 2023-01-09 DIAGNOSIS — F41.9 ANXIETY: ICD-10-CM

## 2023-01-09 DIAGNOSIS — F51.01 PRIMARY INSOMNIA: ICD-10-CM

## 2023-01-09 DIAGNOSIS — R73.03 PRE-DIABETES: Primary | ICD-10-CM

## 2023-01-09 DIAGNOSIS — G47.00 INSOMNIA, UNSPECIFIED TYPE: ICD-10-CM

## 2023-01-09 DIAGNOSIS — E78.49 OTHER HYPERLIPIDEMIA: ICD-10-CM

## 2023-01-09 RX ORDER — GABAPENTIN 300 MG/1
300 CAPSULE ORAL
Qty: 90 CAPSULE | Refills: 0 | Status: SHIPPED | OUTPATIENT
Start: 2023-01-09

## 2023-01-09 RX ORDER — GABAPENTIN 300 MG/1
300 CAPSULE ORAL
Qty: 90 CAPSULE | Refills: 0 | Status: SHIPPED | OUTPATIENT
Start: 2023-01-09 | End: 2023-01-09 | Stop reason: SDUPTHER

## 2023-01-09 NOTE — PROGRESS NOTES
Virtual Regular Visit    Verification of patient location:    Patient is located in the following state in which I hold an active license [place]      Assessment/Plan:    Problem List Items Addressed This Visit        Nervous and Auditory    Bilateral carpal tunnel syndrome     Patient has scheduled surgery  Will start gabapentin, continue mobic  Relevant Medications    gabapentin (Neurontin) 300 mg capsule       Other    Anxiety     Controlled          Primary insomnia     Have tried multiple agents in the past  Continue zolpidem as ordered  controlled substance agreement on file         Pre-diabetes - Primary     HBA1C 5 9  Patient is to continue to work on diet and exercise  Limit sugars and carbohydrate intake  Avoid soda, juice, sweets, cookies, desserts, pasta, bread    Eat more whole grains, exercised 30 min of cardio at least 3 times a week  Also recommended daily foot exams to check for sores, and recommended yearly eye exams  Relevant Orders    CBC and differential    Comprehensive metabolic panel    Hemoglobin A1C    Other hyperlipidemia     Recommend OTC red yeast rice  Advised to increase fiber in diet  Recommend healthy lifestyle choices for your cholesterol  Low fat/low cholesterol diet  Limit/avoid red meat  Eat more lean meat - chicken breast, ground turkey, fish  Exercise 30 mins at least 5 times a week as tolerated               Relevant Orders    Lipid panel   Other Visit Diagnoses     Insomnia, unspecified type                   Reason for visit is   Chief Complaint   Patient presents with   • Virtual Brief Visit     Medication refill   • Health Screening     Mammogram patient will call to schedule colonscopy pt will call to schedule        Encounter provider THOMAS Poon    Provider located at [address] [address]      Recent Visits  No visits were found meeting these conditions  Showing recent visits within past 7 days and meeting all other requirements  Today's Visits  Date Type Provider Dept   01/09/23 THOMAS Wren [facility]   Showing today's visits and meeting all other requirements  Future Appointments  No visits were found meeting these conditions  Showing future appointments within next 150 days and meeting all other requirements       The patient was identified by name and date of birth  Vince Martel was informed that this is a telemedicine visit and that the visit is being conducted through the [address] [employer] platform  She agrees to proceed     My office door was closed  No one else was in the room  She acknowledged consent and understanding of privacy and security of the video platform  The patient has agreed to participate and understands they can discontinue the visit at any time  Patient is aware this is a billable service  Subjective  Vince Martel is a 47 y o  female       Patient presents today to follow-up on chronic conditions    Reviewed blood work    Anxiety- currently controlled off medication       Primary insomnia-uncontrolled with Ambien, have tried different sleep aids in the past with no relief, has been waking up a night due to pain in neck and B/L carpal tunnel (which she will be having surgery for, currently taking mobic for neck pain     Hyperlipidemia-Cholesterol 248, , currently not on medication    Pre-diabetes- HBA1C 5 8           Past Medical History:   Diagnosis Date   • Adjustment disorder    • Anxiety    • Diabetes mellitus ([address] )    • History of cocaine use    • History of tobacco use    • HLD (hyperlipidemia)    • Lipoma of shoulder    • PONV (postoperative nausea and vomiting)    • Sesamoiditis 3/30/2020   • Severe cervical dysplasia 6/16/2017   • Tobacco dependence syndrome        Past Surgical History:   Procedure Laterality Date   • BREAST SURGERY      Augment  • CERVICAL BIOPSY N/A 6/16/2017    Procedure: CONE BIOPSY;  Surgeon: Marco Cueva MD;  Location: [facility];  Service: Gynecology   • EXPLORATORY LAPAROTOMY      Age 17, ovarian cysts   • MAMMO (HISTORICAL)     • NECK SURGERY      Last Assessed 7/14/2016   • SCAPULAR MASS EXCISION Left     lipoma       Current Outpatient Medications   Medication Sig Dispense Refill   • gabapentin (Neurontin) 300 mg capsule Take 1 capsule (300 mg total) by mouth daily at bedtime 90 capsule 0   • Lo Loestrin Fe 1 MG-10 MCG / 10 MCG TABS TAKE 1 TABLET BY MOUTH EVERY DAY 84 tablet 2   • meloxicam (MOBIC) 15 mg tablet Take 15 mg by mouth daily     • zolpidem (AMBIEN CR) 12 5 MG CR tablet Take 1 tablet (12 5 mg total) by mouth daily at bedtime as needed for sleep 30 tablet 0     No current facility-administered medications for this visit  Allergies   Allergen Reactions   • Morphine GI Intolerance and Other (See Comments)     vomiting       Review of Systems   Constitutional: Negative for activity change, appetite change, chills, diaphoresis and fever  HENT: Negative for congestion, ear discharge, ear pain, postnasal drip, rhinorrhea, sinus pressure, sinus pain and sore throat  Eyes: Negative for pain, discharge, itching and visual disturbance  Respiratory: Negative for cough, chest tightness, shortness of breath and wheezing  Cardiovascular: Negative for chest pain, palpitations and leg swelling  Gastrointestinal: Negative for abdominal pain, constipation, diarrhea, nausea and vomiting  Endocrine: Negative for polydipsia, polyphagia and polyuria  Genitourinary: Negative for difficulty urinating, dysuria and urgency  Musculoskeletal: Positive for arthralgias and neck pain  Negative for back pain  Skin: Negative for rash and wound  Neurological: Negative for dizziness, weakness, numbness and headaches  Video Exam    There were no vitals filed for this visit      Physical Exam  Constitutional:       General: She is not in acute distress  Appearance: She is well-developed  She is not diaphoretic  HENT:      Head: Normocephalic and atraumatic  Right Ear: External ear normal       Left Ear: External ear normal       Nose: Nose normal       Mouth/Throat:      Pharynx: No oropharyngeal exudate  Eyes:      General:         Right eye: No discharge  Left eye: No discharge  Conjunctiva/sclera: Conjunctivae normal       Pupils: Pupils are equal, round, and reactive to light  Neck:      Thyroid: No thyromegaly  Cardiovascular:      Rate and Rhythm: Normal rate and regular rhythm  Heart sounds: Normal heart sounds  No murmur heard  No friction rub  No gallop  Pulmonary:      Effort: Pulmonary effort is normal  No respiratory distress  Breath sounds: Normal breath sounds  No stridor  No wheezing or rales  Abdominal:      General: Bowel sounds are normal  There is no distension  Palpations: Abdomen is soft  Tenderness: There is no abdominal tenderness  Musculoskeletal:      Cervical back: Normal range of motion and neck supple  Lymphadenopathy:      Cervical: No cervical adenopathy  Skin:     General: Skin is warm and dry  Findings: No erythema or rash  Neurological:      Mental Status: She is alert and oriented to person, place, and time  Psychiatric:         Behavior: Behavior normal          Thought Content:  Thought content normal          Judgment: Judgment normal           I spent 20 minutes directly with the patient during this visit

## 2023-01-09 NOTE — ASSESSMENT & PLAN NOTE
Have tried multiple agents in the past  Continue zolpidem as ordered  controlled substance agreement on file

## 2023-01-09 NOTE — TELEPHONE ENCOUNTER
Patient would like rx gabapentin (Neurontin) 300 mg capsule to be sent to [facility] [id number] 
None

## 2023-01-09 NOTE — ASSESSMENT & PLAN NOTE
HBA1C 5 9  Patient is to continue to work on diet and exercise  Limit sugars and carbohydrate intake  Avoid soda, juice, sweets, cookies, desserts, pasta, bread    Eat more whole grains, exercised 30 min of cardio at least 3 times a week  Also recommended daily foot exams to check for sores, and recommended yearly eye exams

## 2023-01-09 NOTE — ASSESSMENT & PLAN NOTE
Patient Education     Pneumonia (Adult)  Pneumonia is an infection deep within the lungs. It is in the small air sacs (alveoli). Pneumonia may be caused by a virus or bacteria. Pneumonia caused by bacteria is usually treated with an antibiotic. Severe cases may need to be treated in the hospital. Milder cases can be treated at home. Symptoms usually start to get better during the first 2 days of treatment.    Home care  Follow these guidelines when caring for yourself at home:  · Rest at home for the first 2 to 3 days, or until you feel stronger. Don’t let yourself get overly tired when you go back to your activities.  · Stay away from cigarette smoke - yours or other people’s.  · You may use acetaminophen or ibuprofen to control fever or pain, unless another medicine was prescribed. If you have chronic liver or kidney disease, talk with your health care provider before using these medicines. Also talk with your provider if you’ve had a stomach ulcer or GI bleeding. Don’t give aspirin to anyone younger than 18 years of age who is ill with a fever. It may cause severe liver damage.  · Your appetite may be poor, so a light diet is fine.  · Drink 6 to 8 glasses of fluids every day to make sure you are getting enough fluids. Beverages can include water, sport drinks, sodas without caffeine, juices, tea, or soup. Fluids will help loosen secretions in the lung. This will make it easier for you to cough up the phlegm (sputum). If you also have heart or kidney disease, check with your health care provider before you drink extra fluids.  · Take antibiotic medicine prescribed until it is all gone, even if you are feeling better after a few days.  Follow-up care  Follow up with your health care provider in the next 2 to 3 days, or as advised. This is to be sure the medicine is helping you get better.  If you are 65 or older, you should get a pneumococcal vaccine and a yearly flu (influenza) shot. You should also get these  Recommend OTC red yeast rice  Advised to increase fiber in diet  Recommend healthy lifestyle choices for your cholesterol  Low fat/low cholesterol diet  Limit/avoid red meat  Eat more lean meat - chicken breast, ground turkey, fish  Exercise 30 mins at least 5 times a week as tolerated  vaccines if you have chronic lung disease like asthma, emphysema, or COPD. Ask your provider about this.  When to seek medical advice  Call your health care provider right away if any of these occur:  · You don’t get better within the first 48 hours of treatment  · Shortness of breath gets worse  · Rapid breathing (more than 25 breaths per minute)  · Coughing up blood  · Chest pain gets worse with breathing  · Fever of 102°F (38°C) or higher that doesn’t get better with fever medicine  · Weakness, dizziness, or fainting that gets worse  · Thirst or dry mouth that gets worse  · Sinus pain, headache, or a stiff neck  · Chest pain not caused by coughing  © 8378-3569 [employer]. [address] [place] [address], [place] [address]. All rights reserved. This information is not intended as a substitute for professional medical care. Always follow your healthcare professional's instructions.

## 2023-01-18 ENCOUNTER — TELEPHONE (OUTPATIENT)
Dept: INTERNAL MEDICINE CLINIC | Facility: CLINIC | Age: 53
End: 2023-01-18

## 2023-01-18 NOTE — TELEPHONE ENCOUNTER
Patient stated she has received a bill from [facility] for b/w she had gotten done on 8/30/22  She spoke with [facility] and they stated the diagnoses code needs to be changed for those orders  Patient would like for office to give [facility] a call as she has threw away her EOB

## 2023-01-19 ENCOUNTER — TELEPHONE (OUTPATIENT)
Dept: INTERNAL MEDICINE CLINIC | Facility: CLINIC | Age: 53
End: 2023-01-19

## 2023-01-19 NOTE — TELEPHONE ENCOUNTER
Notified patient she needs to contact insurance company to get explanation of benefits and a copy of the bill so office can correct

## 2023-01-19 NOTE — TELEPHONE ENCOUNTER
Shahnaz Cooper called from [facility] lab for dx code for lab Hemoglobin a1c and cbc with diff  Codes R73 03 and g47 00 was provided

## 2023-01-20 DIAGNOSIS — Z30.41 ENCOUNTER FOR SURVEILLANCE OF CONTRACEPTIVE PILLS: ICD-10-CM

## 2023-01-20 RX ORDER — NORETHINDRONE ACETATE AND ETHINYL ESTRADIOL, ETHINYL ESTRADIOL AND FERROUS FUMARATE 1MG-10(24)
KIT ORAL
Qty: 84 TABLET | Refills: 2 | OUTPATIENT
Start: 2023-01-20

## 2023-01-20 NOTE — TELEPHONE ENCOUNTER
pt not seen here since 3/2021 - rec'd notification that LoLoestrin requires prior auth  Do you want her to continue this?  Last note says menopausal

## 2023-01-24 DIAGNOSIS — G47.00 INSOMNIA, UNSPECIFIED TYPE: ICD-10-CM

## 2023-01-24 RX ORDER — ZOLPIDEM TARTRATE 12.5 MG/1
12.5 TABLET, FILM COATED, EXTENDED RELEASE ORAL
Qty: 30 TABLET | Refills: 0 | Status: SHIPPED | OUTPATIENT
Start: 2023-01-24

## 2023-02-21 ENCOUNTER — OFFICE VISIT (OUTPATIENT)
Dept: INTERNAL MEDICINE CLINIC | Facility: OTHER | Age: 53
End: 2023-02-21

## 2023-02-21 VITALS
OXYGEN SATURATION: 98 % | TEMPERATURE: 96.6 F | HEIGHT: 66 IN | WEIGHT: 179 LBS | SYSTOLIC BLOOD PRESSURE: 118 MMHG | BODY MASS INDEX: 28.77 KG/M2 | HEART RATE: 90 BPM | DIASTOLIC BLOOD PRESSURE: 72 MMHG

## 2023-02-21 DIAGNOSIS — F32.2 CURRENT SEVERE EPISODE OF MAJOR DEPRESSIVE DISORDER WITHOUT PSYCHOTIC FEATURES, UNSPECIFIED WHETHER RECURRENT (HCC): ICD-10-CM

## 2023-02-21 DIAGNOSIS — R05.1 ACUTE COUGH: Primary | ICD-10-CM

## 2023-02-21 DIAGNOSIS — F33.0 MILD EPISODE OF RECURRENT MAJOR DEPRESSIVE DISORDER (HCC): ICD-10-CM

## 2023-02-21 DIAGNOSIS — G47.00 INSOMNIA, UNSPECIFIED TYPE: ICD-10-CM

## 2023-02-21 DIAGNOSIS — J06.9 URI WITH COUGH AND CONGESTION: ICD-10-CM

## 2023-02-21 RX ORDER — AZITHROMYCIN 250 MG/1
TABLET, FILM COATED ORAL
Qty: 6 TABLET | Refills: 0 | Status: SHIPPED | OUTPATIENT
Start: 2023-02-21 | End: 2023-02-26

## 2023-02-21 RX ORDER — ZOLPIDEM TARTRATE 12.5 MG/1
12.5 TABLET, FILM COATED, EXTENDED RELEASE ORAL
Qty: 30 TABLET | Refills: 0 | Status: CANCELLED | OUTPATIENT
Start: 2023-02-21

## 2023-02-21 RX ORDER — SERTRALINE HYDROCHLORIDE 25 MG/1
12.5 TABLET, FILM COATED ORAL DAILY
Qty: 45 TABLET | Refills: 3 | Status: SHIPPED | OUTPATIENT
Start: 2023-02-21

## 2023-02-21 RX ORDER — ZOLPIDEM TARTRATE 12.5 MG/1
12.5 TABLET, FILM COATED, EXTENDED RELEASE ORAL
Qty: 30 TABLET | Refills: 0 | Status: SHIPPED | OUTPATIENT
Start: 2023-02-21

## 2023-02-21 NOTE — ASSESSMENT & PLAN NOTE
-Patient currently on Ambien 12 5 mg daily at bedtime   -Patient to continue on current medication regimen

## 2023-02-21 NOTE — ASSESSMENT & PLAN NOTE
-given patients persistent symptoms will send z-pack   -discussed generalized care of a cold by getting plenty of rest, keeping well hydrated, using moist steam from either the shower or humidifier  -she can continue to take OTC medications as needed for any generalized body aches and congestion   -can continue to use Flonase for nasal congestion

## 2023-02-21 NOTE — LETTER
February 21, 2023     Patient: Tenzin Calderon  YOB: 1970  Date of Visit: 2/21/2023      To Whom it May Concern:    Tenzin Calderon is under my professional care  Flaquita Pierce was seen in my office on 2/21/2023  Flaquita Stan may return to work on 2/23/2023  If you have any questions or concerns, please don't hesitate to call           Sincerely,          THOMAS Lewis        CC: No Recipients

## 2023-02-21 NOTE — ASSESSMENT & PLAN NOTE
-Patient reports feeling very few emotions for the past few months  -PHQ-9 completed- positive score of 10  -patient willing to try a low dose SSRI- will start patient on Sertraline 12 5mg daily  Educated patient that this could take 4-8 weeks to notice a difference in mood    -patient denies any suicidal thoughts and denies any harm to self or others  -therapist offered but patient declines at this time  -follow-up in 3 months to re-evaluate

## 2023-02-21 NOTE — ASSESSMENT & PLAN NOTE
-given patients persistent symptoms will send z-pack   -discussed generalized care of a cold by getting plenty of rest, keeping well hydrated, using moist steam from either the shower or humidifier

## 2023-02-21 NOTE — PROGRESS NOTES
Assessment/Plan:    URI with cough and congestion  -given patients persistent symptoms will send z-pack   -discussed generalized care of a cold by getting plenty of rest, keeping well hydrated, using moist steam from either the shower or humidifier  -she can continue to take OTC medications as needed for any generalized body aches and congestion   -can continue to use Flonase for nasal congestion       Insomnia  -Patient currently on Ambien 12 5 mg daily at bedtime   -Patient to continue on current medication regimen      Acute cough  -given patients persistent symptoms will send z-pack   -discussed generalized care of a cold by getting plenty of rest, keeping well hydrated, using moist steam from either the shower or humidifier        Current severe episode of major depressive disorder without psychotic features ([facility] [address] )  -Patient reports feeling very few emotions for the past few months  -PHQ-9 completed- positive score of 10  -patient willing to try a low dose SSRI- will start patient on Sertraline 12 5mg daily  Educated patient that this could take 4-8 weeks to notice a difference in mood  -patient denies any suicidal thoughts and denies any harm to self or others  -therapist offered but patient declines at this time  -follow-up in 3 months to re-evaluate               Diagnoses and all orders for this visit:    Acute cough  -     azithromycin (Zithromax) 250 mg tablet; Take 2 tablets (500 mg total) by mouth daily for 1 day, THEN 1 tablet (250 mg total) daily for 4 days  Insomnia, unspecified type    Current severe episode of major depressive disorder without psychotic features, unspecified whether recurrent (HCC)  -     sertraline (Zoloft) 25 mg tablet; Take 0 5 tablets (12 5 mg total) by mouth daily    URI with cough and congestion          Subjective:      Patient ID: Sonal Prakash is a 47 y o  female  HPI       48 yo female presents to the office today for a sick visit and concerns for depression  She reports her cough and head congestion started Friday (2/17/2023)  Patient reports fatigue, generalized body aches and frontal/ maxillary sinus pressure that has caused a headache  Patient denies any known fever but does reports chills  Patient reports there are several people at her work that have recently been out sick  Patient reports a productive cough with clear mucus  Patient has tried OTC decongestants and cough medicine without relief  Patient reports she does experience occasional wheezing  Patient is a heavy smoker where she smokes 1/2 PPD  Patient tested negative for COVID two days ago (2/19/2023)  Patient reports she did get the flu shot this year  Patient denies any nausea, vomiting and diarrhea  Depression- patient reports that she has been experiencing very few emotions for the past few months  She states "I am neither happy or sad"  Denies any recent significant life event changes  The PHQ-9 was completed and given a positive score (10)  Patient reports feeling very "mute" like nothing matters to her anymore and things don't bother her as they once have  She reports she does not stress over things like she has done in the past because she just does not care  Patient is looking to start a lose dose anti-depressant to see if that would help her mood  Patient offered to talk with a therapist but patient declines at this time  Patient denies any suicidal thoughts, self inflicted harm or harm to others  The following portions of the patient's history were reviewed and updated as appropriate: allergies, current medications, past family history, past medical history, past social history, past surgical history and problem list     Review of Systems   Constitutional: Positive for chills and fatigue  Negative for appetite change and fever     HENT: Positive for congestion ( head congestion), postnasal drip ( pt has been trying flonase ), rhinorrhea, sinus pressure ( frontal and maxillary sinus pressure) and sneezing  Negative for ear pain and sore throat  Respiratory: Positive for cough ( wet cough with clear productive mucus) and wheezing  Negative for shortness of breath  Gastrointestinal: Negative for diarrhea, nausea and vomiting  Musculoskeletal: Positive for myalgias ( generalized )  Neurological: Positive for headaches ( )  Negative for dizziness and light-headedness  Psychiatric/Behavioral: Negative for self-injury and suicidal ideas  The patient is not nervous/anxious  Past Medical History:   Diagnosis Date   • Adjustment disorder    • Anxiety    • Diabetes mellitus ([address] )    • History of cocaine use    • History of tobacco use    • HLD (hyperlipidemia)    • Lipoma of shoulder    • PONV (postoperative nausea and vomiting)    • Sesamoiditis 3/30/2020   • Severe cervical dysplasia 6/16/2017   • Tobacco dependence syndrome          Current Outpatient Medications:   •  azithromycin (Zithromax) 250 mg tablet, Take 2 tablets (500 mg total) by mouth daily for 1 day, THEN 1 tablet (250 mg total) daily for 4 days  , Disp: 6 tablet, Rfl: 0  •  gabapentin (Neurontin) 300 mg capsule, Take 1 capsule (300 mg total) by mouth daily at bedtime, Disp: 90 capsule, Rfl: 0  •  Lo Loestrin Fe 1 MG-10 MCG / 10 MCG TABS, TAKE 1 TABLET BY MOUTH EVERY DAY, Disp: 84 tablet, Rfl: 2  •  meloxicam (MOBIC) 15 mg tablet, Take 15 mg by mouth daily, Disp: , Rfl:   •  sertraline (Zoloft) 25 mg tablet, Take 0 5 tablets (12 5 mg total) by mouth daily, Disp: 45 tablet, Rfl: 3  •  zolpidem (AMBIEN CR) 12 5 MG CR tablet, Take 1 tablet (12 5 mg total) by mouth daily at bedtime as needed for sleep, Disp: 30 tablet, Rfl: 0    Allergies   Allergen Reactions   • Morphine GI Intolerance and Other (See Comments)     vomiting       Social History   Past Surgical History:   Procedure Laterality Date   • BREAST SURGERY      Augment      • CERVICAL BIOPSY N/A 6/16/2017    Procedure: CONE BIOPSY;  Surgeon: Edgard Marrufo MD; Location: [facility];  Service: Gynecology   • EXPLORATORY LAPAROTOMY      Age 17, ovarian cysts   • MAMMO (HISTORICAL)     • NECK SURGERY      Last Assessed 7/14/2016   • SCAPULAR MASS EXCISION Left     lipoma     Family History   Problem Relation Age of Onset   • Other Mother         No Pertinent Family History   • Other Father         No Pertinent Family History       Objective:  /72 (BP Location: Left arm, Patient Position: Sitting, Cuff Size: Adult)   Pulse 90   Temp (!) 96 6 °F (35 9 °C) (Temporal)   Ht 5' 6" (1 676 m)   Wt 81 2 kg (179 lb)   SpO2 98%   BMI 28 89 kg/m²     No results found for this or any previous visit (from the past 1344 hour(s))  Physical Exam  Vitals and nursing note reviewed  Constitutional:       Appearance: She is ill-appearing  HENT:      Head: Normocephalic and atraumatic  Right Ear: Tympanic membrane, ear canal and external ear normal       Left Ear: Tympanic membrane, ear canal and external ear normal       Nose: Congestion and rhinorrhea present  Mouth/Throat:      Mouth: Mucous membranes are moist    Eyes:      Extraocular Movements: Extraocular movements intact  Conjunctiva/sclera: Conjunctivae normal       Pupils: Pupils are equal, round, and reactive to light  Cardiovascular:      Rate and Rhythm: Normal rate and regular rhythm  Pulses: Normal pulses  Heart sounds: Normal heart sounds  Pulmonary:      Effort: Pulmonary effort is normal  No respiratory distress  Breath sounds: Normal breath sounds  No stridor  No wheezing, rhonchi or rales  Abdominal:      General: Bowel sounds are normal    Skin:     General: Skin is warm and dry  Capillary Refill: Capillary refill takes less than 2 seconds  Neurological:      Mental Status: She is alert and oriented to person, place, and time  Psychiatric:         Attention and Perception: Attention normal          Mood and Affect: Mood is depressed  Affect is flat  Behavior: Behavior is cooperative  Thought Content:  Thought content normal          Judgment: Judgment normal

## 2023-03-23 DIAGNOSIS — G47.00 INSOMNIA, UNSPECIFIED TYPE: ICD-10-CM

## 2023-03-23 RX ORDER — ZOLPIDEM TARTRATE 12.5 MG/1
12.5 TABLET, FILM COATED, EXTENDED RELEASE ORAL
Qty: 30 TABLET | Refills: 0 | Status: SHIPPED | OUTPATIENT
Start: 2023-03-23

## 2023-04-22 PROBLEM — J06.9 URI WITH COUGH AND CONGESTION: Status: RESOLVED | Noted: 2023-02-21 | Resolved: 2023-04-22

## 2023-04-22 PROBLEM — R05.1 ACUTE COUGH: Status: RESOLVED | Noted: 2023-02-21 | Resolved: 2023-04-22

## 2023-04-24 DIAGNOSIS — G47.00 INSOMNIA, UNSPECIFIED TYPE: ICD-10-CM

## 2023-04-24 DIAGNOSIS — Z30.41 ENCOUNTER FOR SURVEILLANCE OF CONTRACEPTIVE PILLS: ICD-10-CM

## 2023-04-24 RX ORDER — NORETHINDRONE ACETATE AND ETHINYL ESTRADIOL, ETHINYL ESTRADIOL AND FERROUS FUMARATE 1MG-10(24)
1 KIT ORAL DAILY
Qty: 28 TABLET | Refills: 0 | Status: SHIPPED | OUTPATIENT
Start: 2023-04-24

## 2023-04-24 RX ORDER — ZOLPIDEM TARTRATE 12.5 MG/1
12.5 TABLET, FILM COATED, EXTENDED RELEASE ORAL
Qty: 30 TABLET | Refills: 0 | Status: SHIPPED | OUTPATIENT
Start: 2023-04-24

## 2023-04-24 NOTE — TELEPHONE ENCOUNTER
Pt has annual apt scheduled 5/9/2023  Will need Lo Loestrin refill to [facility] on file

## 2023-04-25 ENCOUNTER — TELEPHONE (OUTPATIENT)
Dept: OBGYN CLINIC | Facility: CLINIC | Age: 53
End: 2023-04-25

## 2023-04-25 NOTE — TELEPHONE ENCOUNTER
Pt is calling because she wants to know if there is any other birth control medication that is cheaper instead of the lo loestrin

## 2023-04-26 NOTE — TELEPHONE ENCOUNTER
Pt informed can f/u with LoLoestrin savings card for next presc & f/u with JSW @ appt re: continuing ocps

## 2023-05-07 NOTE — ADDENDUM NOTE
Addended by: Brandon Mary on: 2/21/2023 12:12 PM     Modules accepted: Orders, Level of Service
Pt to ED c/o worsening facial pain "in waves" over last week. Pt has hx of trigeminal neuralgia. Per pt's daughter "He has been acting more slowly over the last 3 months". Denies dizziness, slurred speech, headache. Ambulates with a steady gait.

## 2023-05-09 ENCOUNTER — ANNUAL EXAM (OUTPATIENT)
Dept: OBGYN CLINIC | Facility: CLINIC | Age: 53
End: 2023-05-09

## 2023-05-09 VITALS
SYSTOLIC BLOOD PRESSURE: 130 MMHG | WEIGHT: 181 LBS | DIASTOLIC BLOOD PRESSURE: 82 MMHG | HEIGHT: 66 IN | BODY MASS INDEX: 29.09 KG/M2

## 2023-05-09 DIAGNOSIS — Z01.419 WOMEN'S ANNUAL ROUTINE GYNECOLOGICAL EXAMINATION: Primary | ICD-10-CM

## 2023-05-09 NOTE — PROGRESS NOTES
Assessment/Plan:    The patient was informed of a stable menopausal GYN examination  She will be allowed to continue the birth control taken a half a pill 3 times a week  I am concerned about being on HRT, not being seen for over 2 years, is still smoking a pack a day  I cautioned her about the risk of a stroke  I talked her about she needs to see me in 3 months for reevaluation  I did also advise her to make arrangements for a colonoscopy  We made an agreement that if she does not get scheduled for colonoscopy we will stop the birth control pills immediately  She is content with her weight she feels safe at home  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  Subjective:      Patient ID: Jeremy Beverly is a 47 y o  female  HPI     this is a 81-year-old white female, she is a  2 para 2 with 2 prior vaginal deliveries  She is menopausal   She has a history of depression currently on Zoloft  She has a history of insomnia currently on Ambien  She is also on hormone replacement therapy taking half a tablet of birth control pills to help with her menopausal symptoms  She has not been seen here in 2 years  She has not had a colorectal screening  She will continue to get yearly mammograms  She is content with her weight  She feels safe at home  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  Her  recently had open heart surgery, he is doing well  [place] symptoms are under control  There is no activity because of has not recent surgery  There are no new major family illnesses to report  She still has not gotten any colorectal screening done  She is a Cologuard test on her car  She will come back and see me in 3 months for HRT follow-up  If she has not gotten the colonoscopy scheduled we will not prescribe any more birth control pills for her  Patient is still smoking a pack a day    I told her about my concerns about tobacco use HRT and colorectal screening she promises to "do better all through these things  The following portions of the patient's history were reviewed and updated as appropriate: allergies, current medications, past family history, past medical history, past social history, past surgical history and problem list   Review of Systems   HENT: Negative for rhinorrhea  Genitourinary:        Hot flashes night sweats menopausal symptoms  Currently taken I have tablet birth control pills Monday through Friday  All other systems reviewed and are negative  Objective:      /82   Ht 5' 6\" (1 676 m)   Wt 82 1 kg (181 lb)   BMI 29 21 kg/m²          Physical Exam  Vitals reviewed  Exam conducted with a chaperone present  Constitutional:       Appearance: Normal appearance  HENT:      Head: Normocephalic and atraumatic  Nose: No congestion  Mouth/Throat:      Mouth: Mucous membranes are moist    Eyes:      Extraocular Movements: Extraocular movements intact  Pupils: Pupils are equal, round, and reactive to light  Cardiovascular:      Rate and Rhythm: Normal rate and regular rhythm  Pulses: Normal pulses  Heart sounds: Normal heart sounds  Pulmonary:      Effort: Pulmonary effort is normal       Breath sounds: Normal breath sounds  Chest:   Breasts:     Right: Normal  No swelling, bleeding, inverted nipple, mass, nipple discharge or skin change  Left: Normal  No swelling, bleeding, inverted nipple, mass, nipple discharge or skin change  Abdominal:      General: Abdomen is flat  Bowel sounds are normal  There is no distension  Palpations: Abdomen is soft  There is no hepatomegaly, splenomegaly or mass  Tenderness: There is no abdominal tenderness  Hernia: No hernia is present  There is no hernia in the left inguinal area or right inguinal area  Genitourinary:     General: Normal vulva  Pubic Area: No rash or pubic lice  Labia:         Right: No rash, tenderness, lesion or injury           " Left: No rash, tenderness, lesion or injury  Urethra: No prolapse or urethral pain  Vagina: Normal  No signs of injury and foreign body  No vaginal discharge, erythema, tenderness, bleeding, lesions or prolapsed vaginal walls  Cervix: Normal       Uterus: Normal  Not deviated, not enlarged, not fixed, not tender and no uterine prolapse  Adnexa: Right adnexa normal and left adnexa normal         Right: No mass, tenderness or fullness  Left: No mass, tenderness or fullness  Rectum: Normal       Comments: The external genitalia normal limits the vagina is clean the cervix is parous but closed a Pap smear was performed  Uterus is midposition normal size adnexa clear bilaterally  There is no evidence of prolapse  The urethra and bladder normal working relationship a Pap smear was performed  Musculoskeletal:         General: Normal range of motion  Cervical back: Normal range of motion and neck supple  Lymphadenopathy:      Upper Body:      Right upper body: No supraclavicular or axillary adenopathy  Left upper body: No supraclavicular or axillary adenopathy  Lower Body: No right inguinal adenopathy  No left inguinal adenopathy  Skin:     General: Skin is warm  Neurological:      General: No focal deficit present  Mental Status: She is alert and oriented to person, place, and time     Psychiatric:         Mood and Affect: Mood normal          Behavior: Behavior normal

## 2023-05-09 NOTE — PATIENT INSTRUCTIONS
The patient was informed of a stable menopausal GYN examination  She has not been seen in this office for over 2 years  She is taking a birth control pill to help with menopause symptoms  She is aware of not happy the way she is doing that  We decided to take I half a tablet Monday Wednesdays and Fridays  She was counseled also to stop smoking is much as possible  She should return to my office in 3 months  If at that time she has bleeding or spotting or any like that we will stop the pills  If at that time she has not gotten a colonoscopy we will also stop the pills  I strongly encouraged her to try to stop smoking as soon as possible  Talked her about smoking cessation program she is not ready

## 2023-05-11 LAB
HPV HR 12 DNA CVX QL NAA+PROBE: NEGATIVE
HPV16 DNA CVX QL NAA+PROBE: NEGATIVE
HPV18 DNA CVX QL NAA+PROBE: NEGATIVE

## 2023-05-16 LAB
LAB AP GYN PRIMARY INTERPRETATION: NORMAL
Lab: NORMAL

## 2023-05-24 DIAGNOSIS — G47.00 INSOMNIA, UNSPECIFIED TYPE: ICD-10-CM

## 2023-05-24 RX ORDER — ZOLPIDEM TARTRATE 12.5 MG/1
12.5 TABLET, FILM COATED, EXTENDED RELEASE ORAL
Qty: 30 TABLET | Refills: 0 | Status: SHIPPED | OUTPATIENT
Start: 2023-05-24

## 2023-06-12 ENCOUNTER — OFFICE VISIT (OUTPATIENT)
Dept: INTERNAL MEDICINE CLINIC | Facility: CLINIC | Age: 53
End: 2023-06-12
Payer: COMMERCIAL

## 2023-06-12 VITALS
OXYGEN SATURATION: 98 % | TEMPERATURE: 98.6 F | WEIGHT: 183.4 LBS | BODY MASS INDEX: 29.47 KG/M2 | HEART RATE: 91 BPM | SYSTOLIC BLOOD PRESSURE: 140 MMHG | DIASTOLIC BLOOD PRESSURE: 80 MMHG | HEIGHT: 66 IN

## 2023-06-12 DIAGNOSIS — G47.00 INSOMNIA, UNSPECIFIED TYPE: ICD-10-CM

## 2023-06-12 DIAGNOSIS — R73.03 PRE-DIABETES: ICD-10-CM

## 2023-06-12 DIAGNOSIS — M51.36 DDD (DEGENERATIVE DISC DISEASE), LUMBAR: Primary | ICD-10-CM

## 2023-06-12 DIAGNOSIS — R79.89 ABNORMAL TSH: ICD-10-CM

## 2023-06-12 DIAGNOSIS — M54.2 NECK PAIN: ICD-10-CM

## 2023-06-12 DIAGNOSIS — E78.49 OTHER HYPERLIPIDEMIA: ICD-10-CM

## 2023-06-12 DIAGNOSIS — Z86.39 HISTORY OF THYROID NODULE: ICD-10-CM

## 2023-06-12 DIAGNOSIS — F41.9 ANXIETY: ICD-10-CM

## 2023-06-12 DIAGNOSIS — E55.9 VITAMIN D DEFICIENCY: ICD-10-CM

## 2023-06-12 PROBLEM — M51.369 DDD (DEGENERATIVE DISC DISEASE), LUMBAR: Status: ACTIVE | Noted: 2023-06-12

## 2023-06-12 PROCEDURE — 99214 OFFICE O/P EST MOD 30 MIN: CPT | Performed by: NURSE PRACTITIONER

## 2023-06-12 RX ORDER — METHYLPREDNISOLONE 4 MG/1
TABLET ORAL
Qty: 21 EACH | Refills: 0 | Status: SHIPPED | OUTPATIENT
Start: 2023-06-12

## 2023-06-12 NOTE — PROGRESS NOTES
Assessment/Plan:    DDD (degenerative disc disease), lumbar  Will prescribe medrol dose pack   Follow up with [facility]     Anxiety  Well controlled    Insomnia  -Patient currently on Ambien 12 5 mg daily at bedtime   -Patient to continue on current medication regimen    Will get home sleep study    Pre-diabetes  HBA1C 5 9  Patient is to continue to work on diet and exercise  Limit sugars and carbohydrate intake  Avoid soda, juice, sweets, cookies, desserts, pasta, bread    Eat more whole grains, exercised 30 min of cardio at least 3 times a week  Also recommended daily foot exams to check for sores, and recommended yearly eye exams  Other hyperlipidemia  Recommend OTC red yeast rice  Advised to increase fiber in diet  Recommend healthy lifestyle choices for your cholesterol  Low fat/low cholesterol diet  Limit/avoid red meat  Eat more lean meat - chicken breast, ground turkey, fish  Exercise 30 mins at least 5 times a week as tolerated  BMI Counseling: Body mass index is 29 23 kg/m²  The BMI is above normal  Nutrition recommendations include decreasing portion sizes, encouraging healthy choices of fruits and vegetables, decreasing fast food intake, consuming healthier snacks, limiting drinks that contain sugar, moderation in carbohydrate intake, increasing intake of lean protein, reducing intake of saturated and trans fat and reducing intake of cholesterol  Exercise recommendations include moderate physical activity 150 minutes/week and exercising 3-5 times per week  Rationale for BMI follow-up plan is due to patient being overweight or obese  Diagnoses and all orders for this visit:    DDD (degenerative disc disease), lumbar  -     methylPREDNISolone 4 MG tablet therapy pack; Use as directed on package    Other hyperlipidemia  -     Lipid panel    Pre-diabetes  -     Comprehensive metabolic panel;  Future  -     CBC and differential  -     Hemoglobin A1C    Vitamin D deficiency  -     Vitamin D 25 hydroxy    Abnormal TSH  -     TSH, 3rd generation with Free T4 reflex    Insomnia, unspecified type  -     Home Study; Future    History of thyroid nodule  -     US thyroid; Future    Neck pain    Anxiety          Subjective:      Patient ID: Ileana Garcia is a 47 y o  female  Patient presents today to follow-up on chronic conditions  Reviewed blood work    Anxiety- currently controlled off medication       Primary insomnia-uncontrolled with Ambien, have tried different sleep aids in the past with no relief, has been waking up a night   She reports that she has to nap for one hour every day    Hyperlipidemia-Cholesterol 248, , currently not on medication    Pre-diabetes- HBA1C 5 8    Chronic lumbar back pain- was seen previously by pain management      The following portions of the patient's history were reviewed and updated as appropriate: allergies, current medications, past family history, past medical history, past social history, past surgical history and problem list     Review of Systems   Constitutional: Negative for activity change, appetite change, chills, diaphoresis and fever  HENT: Negative for congestion, ear discharge, ear pain, postnasal drip, rhinorrhea, sinus pressure, sinus pain and sore throat  Eyes: Negative for pain, discharge, itching and visual disturbance  Respiratory: Negative for cough, chest tightness, shortness of breath and wheezing  Cardiovascular: Negative for chest pain, palpitations and leg swelling  Gastrointestinal: Negative for abdominal pain, constipation, diarrhea, nausea and vomiting  Endocrine: Negative for polydipsia, polyphagia and polyuria  Genitourinary: Negative for difficulty urinating, dysuria and urgency  Musculoskeletal: Negative for arthralgias, back pain and neck pain  Skin: Negative for rash and wound  Neurological: Negative for dizziness, weakness, numbness and headaches  "  Psychiatric/Behavioral: Positive for sleep disturbance  Past Medical History:   Diagnosis Date   • Adjustment disorder    • Anxiety    • Diabetes mellitus ([facility] [address] )    • History of cocaine use    • History of tobacco use    • HLD (hyperlipidemia)    • Lipoma of shoulder    • PONV (postoperative nausea and vomiting)    • Sesamoiditis 3/30/2020   • Severe cervical dysplasia 6/16/2017   • Tobacco dependence syndrome          Current Outpatient Medications:   •  meloxicam (MOBIC) 15 mg tablet, Take 15 mg by mouth daily, Disp: , Rfl:   •  methylPREDNISolone 4 MG tablet therapy pack, Use as directed on package, Disp: 21 each, Rfl: 0  •  Norethin-Eth Estrad-Fe Biphas (Lo Loestrin Fe) 1 MG-10 MCG / 10 MCG TABS, Take 1 tablet by mouth daily, Disp: 28 tablet, Rfl: 0  •  zolpidem (AMBIEN CR) 12 5 MG CR tablet, Take 1 tablet (12 5 mg total) by mouth daily at bedtime as needed for sleep, Disp: 30 tablet, Rfl: 0    Allergies   Allergen Reactions   • Morphine GI Intolerance and Other (See Comments)     vomiting       Social History   Past Surgical History:   Procedure Laterality Date   • BREAST SURGERY      Augment      • CERVICAL BIOPSY N/A 6/16/2017    Procedure: CONE BIOPSY;  Surgeon: Jessica Duke MD;  [facility]: [facility];  Service: Gynecology   • EXPLORATORY LAPAROTOMY      Age 17, ovarian cysts   • MAMMO (HISTORICAL)     • NECK SURGERY      Last Assessed 7/14/2016   • SCAPULAR MASS EXCISION Left     lipoma     Family History   Problem Relation Age of Onset   • Other Mother         No Pertinent Family History   • Other Father         No Pertinent Family History       Objective:  /80 (BP Location: Left arm, Patient Position: Sitting, Cuff Size: Standard)   Pulse 91   Temp 98 6 °F (37 °C) (Temporal)   Ht 5' 6 42\" (1 687 m)   Wt 83 2 kg (183 lb 6 4 oz)   SpO2 98%   BMI 29 23 kg/m²     Recent Results (from the past 1344 hour(s))   Liquid-based pap, screening    Collection Time: 05/09/23  4:41 PM   Result Value " Ref Range    Case Report       Gynecologic Cytology Report                       Case: [id number]                                  Authorizing Provider:  Kurtis Tatum MD         Collected:           05/09/2023 1641              Ordering Location:     [facility]      Received:            05/09/2023 1641              First Screen:          Radha Livingstno                                                                  Specimen:    LIQUID-BASED PAP, SCREENING, Cervix                                                        Primary Interpretation Negative for intraepithelial lesion or malignancy     Specimen Adequacy       Satisfactory for evaluation  Absence of endocervical/transformation zone component  Additional Information       [employer]'s FDA approved ,  and ThinPrep Imaging Duo System are utilized with strict adherence to the 's instruction manual to prepare gynecologic and non-gynecologic cytology specimens for the production of ThinPrep slides as well as for gynecologic ThinPrep imaging  These processes have been validated by our laboratory and/or by the   The Pap test is not a diagnostic procedure and should not be used as the sole means to detect cervical cancer  It is only a screening procedure to aid in the detection of cervical cancer and its precursors  Both false-negative and false-positive results have been experienced  Your patient's test result should be interpreted in this context together with the history and clinical findings  HPV High Risk    Collection Time: 05/09/23  4:41 PM    Specimen: Cervix; Thin-Prep Vial   Result Value Ref Range    HPV Other HR Negative Negative    HPV16 Negative Negative    HPV18 Negative Negative            Physical Exam  Constitutional:       General: She is not in acute distress  Appearance: She is well-developed  She is not diaphoretic  HENT:      Head: Normocephalic and atraumatic        Right Ear: External ear normal       Left Ear: External ear normal       Nose: Nose normal       Mouth/Throat:      Pharynx: No oropharyngeal exudate  Eyes:      General:         Right eye: No discharge  Left eye: No discharge  Conjunctiva/sclera: Conjunctivae normal       Pupils: Pupils are equal, round, and reactive to light  Neck:      Thyroid: No thyromegaly  Cardiovascular:      Rate and Rhythm: Normal rate and regular rhythm  Heart sounds: Normal heart sounds  No murmur heard  No friction rub  No gallop  Pulmonary:      Effort: Pulmonary effort is normal  No respiratory distress  Breath sounds: Normal breath sounds  No stridor  No wheezing or rales  Abdominal:      General: Bowel sounds are normal  There is no distension  Palpations: Abdomen is soft  Tenderness: There is no abdominal tenderness  Musculoskeletal:      Cervical back: Normal range of motion and neck supple  Lymphadenopathy:      Cervical: No cervical adenopathy  Skin:     General: Skin is warm and dry  Findings: No erythema or rash  Neurological:      Mental Status: She is alert and oriented to person, place, and time  Psychiatric:         Behavior: Behavior normal          Thought Content:  Thought content normal          Judgment: Judgment normal

## 2023-06-13 NOTE — ASSESSMENT & PLAN NOTE
Recommend OTC red yeast rice  Advised to increase fiber in diet  Recommend healthy lifestyle choices for your cholesterol  Low fat/low cholesterol diet  Limit/avoid red meat  Eat more lean meat - chicken breast, ground turkey, fish  Exercise 30 mins at least 5 times a week as tolerated

## 2023-06-13 NOTE — ASSESSMENT & PLAN NOTE
-Patient currently on Ambien 12 5 mg daily at bedtime   -Patient to continue on current medication regimen    Will get home sleep study

## 2023-06-19 ENCOUNTER — TELEPHONE (OUTPATIENT)
Dept: SLEEP CENTER | Facility: CLINIC | Age: 53
End: 2023-06-19

## 2023-06-19 NOTE — TELEPHONE ENCOUNTER
----- Message from Darnell Flynn MD sent at 6/18/2023  5:07 PM EDT -----  approved  ----- Message -----  From: Jack De Souza  Sent: 6/16/2023   8:44 AM EDT  To: Sleep Medicine [facility] Provider    This Home sleep study needs approval      If approved please sign and return to clerical pool  If denied please include reasons why  Also provide alternative testing if warranted  Please sign and return to clerical pool

## 2023-06-22 DIAGNOSIS — G47.00 INSOMNIA, UNSPECIFIED TYPE: ICD-10-CM

## 2023-06-22 RX ORDER — ZOLPIDEM TARTRATE 12.5 MG/1
12.5 TABLET, FILM COATED, EXTENDED RELEASE ORAL
Qty: 30 TABLET | Refills: 0 | Status: SHIPPED | OUTPATIENT
Start: 2023-06-22

## 2023-06-24 ENCOUNTER — HOSPITAL ENCOUNTER (OUTPATIENT)
Dept: RADIOLOGY | Facility: HOSPITAL | Age: 53
Discharge: HOME | End: 2023-06-24
Payer: COMMERCIAL

## 2023-06-24 DIAGNOSIS — Z86.39 HISTORY OF THYROID NODULE: ICD-10-CM

## 2023-06-24 PROCEDURE — 76536 US EXAM OF HEAD AND NECK: CPT

## 2023-07-03 DIAGNOSIS — E04.1 THYROID NODULE: Primary | ICD-10-CM

## 2023-07-06 ENCOUNTER — TELEPHONE (OUTPATIENT)
Dept: INTERNAL MEDICINE CLINIC | Facility: OTHER | Age: 53
End: 2023-07-06

## 2023-07-06 NOTE — TELEPHONE ENCOUNTER
[facility] called for clarification on exactly which nodule needed biopsy. Please advise & send to clinical pool.

## 2023-07-12 DIAGNOSIS — E04.1 THYROID NODULE: Primary | ICD-10-CM

## 2023-07-17 ENCOUNTER — PREP FOR PROCEDURE (OUTPATIENT)
Dept: INTERVENTIONAL RADIOLOGY/VASCULAR | Facility: CLINIC | Age: 53
End: 2023-07-17

## 2023-07-17 DIAGNOSIS — E04.1 THYROID NODULE: Primary | ICD-10-CM

## 2023-07-24 DIAGNOSIS — G47.00 INSOMNIA, UNSPECIFIED TYPE: ICD-10-CM

## 2023-07-24 RX ORDER — ZOLPIDEM TARTRATE 12.5 MG/1
12.5 TABLET, FILM COATED, EXTENDED RELEASE ORAL
Qty: 30 TABLET | Refills: 0 | Status: SHIPPED | OUTPATIENT
Start: 2023-07-24

## 2023-08-14 ENCOUNTER — TELEPHONE (OUTPATIENT)
Dept: OBGYN CLINIC | Facility: CLINIC | Age: 53
End: 2023-08-14

## 2023-08-14 NOTE — TELEPHONE ENCOUNTER
Patient cx'd apt for 8-16 for 3 month  med f/u. She has decided to stop taking the Lo Loestrin. She states it was working for her but she doesn't want to do the medication follow up appointment's that would be needed to be on the medication.

## 2023-08-14 NOTE — TELEPHONE ENCOUNTER
Spoke to patient this morning. She is now decided to stop the low-dose birth control pill she is feeling fine no complaints. If there is a problem she will call me later for follow-up.

## 2023-08-16 ENCOUNTER — TELEPHONE (OUTPATIENT)
Dept: RADIOLOGY | Facility: HOSPITAL | Age: 53
End: 2023-08-16

## 2023-08-16 RX ORDER — SODIUM CHLORIDE 9 MG/ML
75 INJECTION, SOLUTION INTRAVENOUS CONTINUOUS
Status: CANCELLED | OUTPATIENT
Start: 2023-08-16

## 2023-08-17 ENCOUNTER — TELEPHONE (OUTPATIENT)
Dept: RADIOLOGY | Facility: HOSPITAL | Age: 53
End: 2023-08-17

## 2023-08-17 NOTE — PRE-PROCEDURE INSTRUCTIONS
Pre-procedure Instructions for Interventional Radiology  [address]  [address] [address] [place]  [phone]    You are scheduled for a/an Thyroid Mass Biopsy. On Thursday 8/24/23. Your tentative arrival time is 0900. Short stay will notify you the day before your procedure with the exact arrival time and the location to arrive. To prepare for your procedure:  1. Please arrange for someone to drive you home after the procedure and stay with you until the next morning if you are instructed to do so. This is typically for patients receiving some type of sedative or anesthetic for the procedure. 2. DO NOT EAT OR DRINK ANYTHING after midnight on the evening before your procedure including candy & gum.  3. ONLY SIPS OF WATER with your medications are allowed on the morning of your procedure. 4. TAKE ALL OF YOUR REGULAR MEDICATIONS THE MORNING OF YOUR PROCEDURE with sips of water! We may call you to stop some of your blood sugar, blood pressure and blood thinning medications depending on the procedure. Please take all of these medications unless we instruct you to stop them. 5. If you have an allergy to x-ray dye, please contact Interventional Radiology for an x-ray dye preparation which usually consists of an oral steroid and Benadryl. The day of your procedure:  1. Bring a list of the medications you take at home. 2. Bring medications you take for breathing problems (such as inhalers), medications for chest pain, or both. 3. Bring a case for your glasses or contacts. 4. Bring your insurance card and a form of photo ID.  5. Please leave all valuables such as credit cards and jewelry at home. 6. Report to the registration desk in the main lobby at the [facility], Entrance B. Ask to be directed to [facility]. 7. While your procedure is being performed, your family may wait in the Radiology Waiting Room on the 1st floor in Radiology. if they need to leave, they may provide a number to be called following the procedure. 8. Be prepared to stay overnight just in case. Sometimes procedures will indicate the need for further observation or treatment. 9. If you are scheduled for a follow-up visit with the Interventional Radiologist after your procedure, you will be called with a date and time.     Special Instructions (Medications to stop taking before your procedure etc.):

## 2023-08-22 DIAGNOSIS — G47.00 INSOMNIA, UNSPECIFIED TYPE: ICD-10-CM

## 2023-08-22 RX ORDER — ZOLPIDEM TARTRATE 12.5 MG/1
12.5 TABLET, FILM COATED, EXTENDED RELEASE ORAL
Qty: 30 TABLET | Refills: 0 | Status: SHIPPED | OUTPATIENT
Start: 2023-08-22

## 2023-08-24 ENCOUNTER — HOSPITAL ENCOUNTER (OUTPATIENT)
Dept: RADIOLOGY | Facility: HOSPITAL | Age: 53
Discharge: HOME | End: 2023-08-24
Attending: RADIOLOGY
Payer: COMMERCIAL

## 2023-08-24 VITALS
SYSTOLIC BLOOD PRESSURE: 139 MMHG | TEMPERATURE: 98.1 F | WEIGHT: 185 LBS | HEIGHT: 66 IN | BODY MASS INDEX: 29.73 KG/M2 | OXYGEN SATURATION: 95 % | DIASTOLIC BLOOD PRESSURE: 75 MMHG | RESPIRATION RATE: 16 BRPM | HEART RATE: 86 BPM

## 2023-08-24 DIAGNOSIS — E04.1 THYROID NODULE: ICD-10-CM

## 2023-08-24 PROCEDURE — 76942 ECHO GUIDE FOR BIOPSY: CPT

## 2023-08-24 PROCEDURE — 99152 MOD SED SAME PHYS/QHP 5/>YRS: CPT | Performed by: RADIOLOGY

## 2023-08-24 PROCEDURE — 76937 US GUIDE VASCULAR ACCESS: CPT

## 2023-08-24 PROCEDURE — 88173 CYTOPATH EVAL FNA REPORT: CPT | Performed by: PATHOLOGY

## 2023-08-24 PROCEDURE — 88172 CYTP DX EVAL FNA 1ST EA SITE: CPT | Performed by: PATHOLOGY

## 2023-08-24 PROCEDURE — 10005 FNA BX W/US GDN 1ST LES: CPT | Performed by: RADIOLOGY

## 2023-08-24 PROCEDURE — 60100 BIOPSY OF THYROID: CPT

## 2023-08-24 RX ORDER — SODIUM CHLORIDE 9 MG/ML
75 INJECTION, SOLUTION INTRAVENOUS CONTINUOUS
Status: DISCONTINUED | OUTPATIENT
Start: 2023-08-24 | End: 2023-08-25 | Stop reason: HOSPADM

## 2023-08-24 RX ORDER — LIDOCAINE HYDROCHLORIDE 10 MG/ML
INJECTION, SOLUTION EPIDURAL; INFILTRATION; INTRACAUDAL; PERINEURAL AS NEEDED
Status: COMPLETED | OUTPATIENT
Start: 2023-08-24 | End: 2023-08-24

## 2023-08-24 RX ORDER — FENTANYL CITRATE 50 UG/ML
INJECTION, SOLUTION INTRAMUSCULAR; INTRAVENOUS AS NEEDED
Status: COMPLETED | OUTPATIENT
Start: 2023-08-24 | End: 2023-08-24

## 2023-08-24 RX ORDER — MIDAZOLAM HYDROCHLORIDE 2 MG/2ML
INJECTION, SOLUTION INTRAMUSCULAR; INTRAVENOUS AS NEEDED
Status: COMPLETED | OUTPATIENT
Start: 2023-08-24 | End: 2023-08-24

## 2023-08-24 RX ADMIN — FENTANYL CITRATE 50 MCG: 50 INJECTION, SOLUTION INTRAMUSCULAR; INTRAVENOUS at 10:54

## 2023-08-24 RX ADMIN — MIDAZOLAM 0.5 MG: 1 INJECTION INTRAMUSCULAR; INTRAVENOUS at 11:02

## 2023-08-24 RX ADMIN — MIDAZOLAM 1 MG: 1 INJECTION INTRAMUSCULAR; INTRAVENOUS at 10:49

## 2023-08-24 RX ADMIN — SODIUM CHLORIDE 75 ML/HR: 0.9 INJECTION, SOLUTION INTRAVENOUS at 09:10

## 2023-08-24 RX ADMIN — FENTANYL CITRATE 50 MCG: 50 INJECTION, SOLUTION INTRAMUSCULAR; INTRAVENOUS at 10:49

## 2023-08-24 RX ADMIN — LIDOCAINE HYDROCHLORIDE 10 ML: 10 INJECTION, SOLUTION EPIDURAL; INFILTRATION; INTRACAUDAL; PERINEURAL at 11:02

## 2023-08-24 RX ADMIN — FENTANYL CITRATE 25 MCG: 50 INJECTION, SOLUTION INTRAMUSCULAR; INTRAVENOUS at 11:02

## 2023-08-24 RX ADMIN — MIDAZOLAM 0.5 MG: 1 INJECTION INTRAMUSCULAR; INTRAVENOUS at 11:07

## 2023-08-24 RX ADMIN — MIDAZOLAM 1 MG: 1 INJECTION INTRAMUSCULAR; INTRAVENOUS at 10:54

## 2023-08-24 RX ADMIN — FENTANYL CITRATE 25 MCG: 50 INJECTION, SOLUTION INTRAMUSCULAR; INTRAVENOUS at 11:07

## 2023-08-24 NOTE — DISCHARGE INSTRUCTIONS
POST BIOPSY    Care after your procedure:    1. Limit your activities for 24 hours after your biopsy. 2. No driving day of biopsy. 3. Return to your normal diet. Small sips of flat soda will help with mild nausea. 4. Remove band-aid or dressing 24 hours after procedure. Contact Interventional Radiology at [phone] [place] PATIENTS: Contact Interventional Radiology at [phone] Daria Parker PATIENTS: Contact Interventional Radiology at [phone]) if:    1. Difficulty breathing, nausea or vomiting. 2. Chills or fever above 101 degrees F.     3. Pain at biopsy site not relieved by medication. 4. Develop any redness, swelling, heat, unusual drainage, heavy bruising or bleeding from biopsy site. Moderate Sedation   WHAT YOU NEED TO KNOW:   Moderate sedation, or conscious sedation, is medicine used during procedures to help you feel relaxed and calm. You will be awake and able to follow directions without anxiety or pain. You will remember little to none of the procedure. You may feel tired, weak, or unsteady on your feet after you get sedation. You may also have trouble concentrating or short-term memory loss. These symptoms should go away in 24 hours or less. DISCHARGE INSTRUCTIONS:   Call 911 or have someone else call for any of the following: You have sudden trouble breathing. You cannot be woken. Seek care immediately if:   You have a severe headache or dizziness. Your heart is beating faster than usual.  Contact your healthcare provider if:   You have a fever. You have nausea or are vomiting for more than 8 hours after the procedure. Your skin is itchy, swollen, or you have a rash. You have questions or concerns about your condition or care. Self-care:   Have someone stay with you for 24 hours. This person can drive you to errands and help you do things around the house. This person can also watch for problems.       Rest and do quiet activities for 24 hours. Do not exercise, ride a bike, or play sports. Stand up slowly to prevent dizziness and falls. Take short walks around the house with another person. Slowly return to your usual activities the next day. Do not drive or use dangerous machines or tools for 24 hours. You may injure yourself or others. Examples include a lawnmower, saw, or drill. Do not return to work for 24 hours if you use dangerous machines or tools for work. Do not make important decisions for 24 hours. For example, do not sign important papers or invest money. Drink liquids as directed. Liquids help flush the sedation medicine out of your body. Ask how much liquid to drink each day and which liquids are best for you. Eat small, frequent meals to prevent nausea and vomiting. Start with clear liquids such as juice or broth. If you do not vomit after clear liquids, you can eat your usual foods. Do not drink alcohol or take medicines that make you drowsy. This includes medicines that help you sleep and anxiety medicines. Ask your healthcare provider if it is safe for you to take pain medicine. Follow up with your healthcare provider as directed: Write down your questions so you remember to ask them during your visits. © 2017 [facility] Information is for End User's use only and may not be sold, redistributed or otherwise used for commercial purposes. All illustrations and images included in CareNotes® are the copyrighted property of [employer] Inc. or Deven Childs. The above information is an  only. It is not intended as medical advice for individual conditions or treatments. Talk to your doctor, nurse or pharmacist before following any medical regimen to see if it is safe and effective for you.

## 2023-08-24 NOTE — BRIEF OP NOTE (RAD/CATH)
INTERVENTIONAL RADIOLOGY PROCEDURE NOTE    Date: 8/24/2023    Procedure:   Procedure Summary     Date: 08/24/23 Room / Location: [facility] Interventional Radiology    Anesthesia Start:  Anesthesia Stop:     Procedure: IR BIOPSY THYROID WITH MOLECULAR TESTING Diagnosis:       Thyroid nodule      (left thyroid nodule biopsy w/ sedation)    Scheduled Providers:  Responsible Provider:     Anesthesia Type: Not recorded ASA Status: Not recorded          Preoperative diagnosis:   1. Thyroid nodule         Postoperative diagnosis: Same. Surgeon: Claudia Molina MD     Assistant: None. No qualified resident was available. Blood loss: None     Specimens: Cytology and Afirma 5 passes 25-gauge    Findings: Left thyroid nodule biopsy with ultrasound guidance. Adequate. Complications: None immediate.     Anesthesia: IV sedation

## 2023-08-24 NOTE — H&P
Interventional Radiology  History and Physical 8/24/2023     [facility]   1970   [id number]    H&P reviewed. There have been no interval changes since the time the H&P was written. /78 (BP Location: Right arm)   Pulse 79   Temp 98.6 °F (37 °C) (Temporal)   Resp 16   Ht 5' 6" (1.676 m)   Wt 83.9 kg (185 lb)   SpO2 97%   BMI 29.86 kg/m²     Prior imaging was reviewed. 78-year-old woman with left thyroid nodule. Referred for percutaneous biopsy. She wants to be sedated for procedure due to anxiety. Procedure discussed and all questions answered. Informed written consent was obtained.     Mar Romero MD

## 2023-08-25 ENCOUNTER — TELEPHONE (OUTPATIENT)
Dept: INTERNAL MEDICINE CLINIC | Facility: OTHER | Age: 53
End: 2023-08-25

## 2023-08-25 NOTE — TELEPHONE ENCOUNTER
Received a patient advice request forwarded to me. I see you added Z00.00 to bloodwork. It doesn't look like anything more was done after that. I called [facility] and had them resubmit to pt's insurance. They added the Z00.00 and removed all diagnostic codes from the bloodwork. Informed pt labs were resubmitted and it can take 30-45 days to receive new EOB.

## 2023-08-28 PROCEDURE — 88173 CYTOPATH EVAL FNA REPORT: CPT | Performed by: PATHOLOGY

## 2023-08-28 PROCEDURE — 88172 CYTP DX EVAL FNA 1ST EA SITE: CPT | Performed by: PATHOLOGY

## 2023-08-29 ENCOUNTER — TELEPHONE (OUTPATIENT)
Dept: INTERNAL MEDICINE CLINIC | Facility: OTHER | Age: 53
End: 2023-08-29

## 2023-08-29 DIAGNOSIS — E04.1 THYROID NODULE: Primary | ICD-10-CM

## 2023-08-29 NOTE — TELEPHONE ENCOUNTER
Called patient, noticed a Consult appointment was scheduled on 9/14/23 and confirmed location and appt with Dr. Carmencita Alexander in [facility].

## 2023-08-29 NOTE — TELEPHONE ENCOUNTER
Patient tried to call Surgical Onc phone number without success that was on [employer] for Dr. Rakesh Nicole. Can someone please call patient at [phone] to schedule? Thank you in advance for your help.

## 2023-08-29 NOTE — TELEPHONE ENCOUNTER
Please call and notify patient that I reviewed her biopsy results and I would like to see surgical oncology for further discussion, I will be placing an order. If she would like to discuss further we could do so via virtual visit I can squeeze her in.

## 2023-08-29 NOTE — TELEPHONE ENCOUNTER
----- Message from Lobito Awad sent at 8/28/2023  1:14 PM EDT -----  Regarding: FW: Thyroid nodule biopsy results  Contact: [phone]    ----- Message -----  From: Eve Vann  Sent: 8/28/2023  10:59 AM EDT  To: [facility]  Subject: Thyroid nodule biopsy results                    Hello there. I see that my results are in. Can we discuss them over the phone? I am not sure if I should be concerned.   Evie Angelo

## 2023-08-29 NOTE — TELEPHONE ENCOUNTER
Discussed with patient; Dr. Hilda Genao number given to patient and she will call to set up appointment.

## 2023-09-11 PROBLEM — E04.1 THYROID NODULE: Status: ACTIVE | Noted: 2023-09-11

## 2023-09-14 ENCOUNTER — CONSULT (OUTPATIENT)
Dept: SURGICAL ONCOLOGY | Facility: CLINIC | Age: 53
End: 2023-09-14
Payer: COMMERCIAL

## 2023-09-14 VITALS
OXYGEN SATURATION: 98 % | TEMPERATURE: 96.8 F | HEIGHT: 66 IN | HEART RATE: 100 BPM | DIASTOLIC BLOOD PRESSURE: 84 MMHG | WEIGHT: 187 LBS | RESPIRATION RATE: 16 BRPM | BODY MASS INDEX: 30.05 KG/M2 | SYSTOLIC BLOOD PRESSURE: 130 MMHG

## 2023-09-14 DIAGNOSIS — E04.1 THYROID NODULE: Primary | ICD-10-CM

## 2023-09-14 PROCEDURE — 99244 OFF/OP CNSLTJ NEW/EST MOD 40: CPT | Performed by: SURGERY

## 2023-09-14 NOTE — PROGRESS NOTES
Surgical Oncology Consult       [address] [facility]  [address] [place] [id number] [phone]    Opal De La Torre  1970  [id number]  [address] [facility] [address] [place] [id number] [phone]    Diagnoses and all orders for this visit:    Thyroid nodule  -     Ambulatory Referral to Surgical Oncology        Chief Complaint   Patient presents with   • Advice Only       Return in about 4 weeks (around 10/12/2023) for Office Visit. Oncology History    No history exists. History of Present Illness: 61-year-old female with 2 previous cervical neck surgeries through an anterior approach. She has known about thyroid nodules for several years because of these surgeries. Over the last month she has had progressive dysphagia to both solids and liquids. No family history of thyroid cancer or thyroid problems. She tells me her thyroid function was normal.  She has no history of radiation to the head or neck. No family history of thyroid cancer or thyroid problems. Thyroid ultrasound revealed a 2.9 x 1.6 x 2.3 cm nodule on the left as well as a second left upper pole 1.1 x 0.9 x 1 cm nodule. The larger nodule was biopsied. This revealed atypia of undetermined significance, Afirma was benign. Review of Systems  Complete ROS Surg Onc:   Constitutional: The patient denies new or recent history of general fatigue, no recent weight loss, no change in appetite. Eyes: No complaints of visual problems, no scleral icterus. ENT: no complaints of ear pain, no hoarseness, no difficulty swallowing,  no tinnitus and no new masses in head, oral cavity, or neck. Cardiovascular: No complaints of chest pain, no palpitations, no ankle edema. Respiratory: No complaints of shortness of breath, no cough.    Gastrointestinal: No complaints of jaundice, no bloody stools, no pale stools. Genitourinary: No complaints of dysuria, no hematuria, no nocturia, no frequent urination, no urethral discharge. Musculoskeletal: No complaints of weakness, paralysis, joint stiffness or arthralgias. Integumentary: No complaints of rash, no new lesions. Neurological: No complaints of convulsions, no seizures, no dizziness. Hematologic/Lymphatic: No complaints of easy bruising. Endocrine:  No hot or cold intolerance. No polydipsia, polyphagia, or polyuria. Allergy/immunology:  No environmental allergies. No food allergies. Not immunocompromised. Skin:  No pallor or rash. No wound. Patient Active Problem List   Diagnosis   • Neck pain   • Anxiety   • Insomnia   • Pre-diabetes   • Overweight (BMI 25.0-29. 9)   • COVID-19   • Other hyperlipidemia   • Cigarette nicotine dependence without complication   • Bilateral carpal tunnel syndrome   • Mild episode of recurrent major depressive disorder (HCC)   • DDD (degenerative disc disease), lumbar   • Thyroid nodule     Past Medical History:   Diagnosis Date   • Adjustment disorder    • Anxiety    • Diabetes mellitus ([address])    • History of cocaine use    • History of tobacco use    • HLD (hyperlipidemia)    • Lipoma of shoulder    • PONV (postoperative nausea and vomiting)    • Sesamoiditis 3/30/2020   • Severe cervical dysplasia 6/16/2017   • Tobacco dependence syndrome      Past Surgical History:   Procedure Laterality Date   • BREAST SURGERY      Augment.     • CERVICAL BIOPSY N/A 6/16/2017    Procedure: CONE BIOPSY;  Surgeon: Edgar Stevenson MD;  Location: [facility];  Service: Gynecology   • EXPLORATORY LAPAROTOMY      Age 17, ovarian cysts   • IR BIOPSY ABDOMEN  8/24/2023   • MAMMO (HISTORICAL)     • NECK SURGERY      Last Assessed 7/14/2016   • SCAPULAR MASS EXCISION Left     lipoma     Family History   Problem Relation Age of Onset   • Other Mother         No Pertinent Family History   • Other Father         No Pertinent Family History     Social History     Socioeconomic History   • Marital status: /Civil Union     Spouse name: Not on file   • Number of children: Not on file   • Years of education: Not on file   • Highest education level: Not on file   Occupational History   • Not on file   Tobacco Use   • Smoking status: Heavy Smoker     Packs/day: 0.50     Years: 35.00     Total pack years: 17.50     Types: Cigarettes     Start date: 13   • Smokeless tobacco: Never   • Tobacco comments:     Smoked on and off   Vaping Use   • Vaping Use: Never used   Substance and Sexual Activity   • Alcohol use: Yes     Comment: social   • Drug use: No     Comment: hx of Cocaine use. • Sexual activity: Yes     Partners: Male     Birth control/protection: Post-menopausal   Other Topics Concern   • Not on file   Social History Narrative   • Not on file     Social Determinants of Health     Financial Resource Strain: Not on file   Food Insecurity: Not on file   Transportation Needs: Not on file   Physical Activity: Not on file   Stress: Not on file   Social Connections: Not on file   Intimate Partner Violence: Not on file   Housing Stability: Not on file       Current Outpatient Medications:   •  meloxicam (MOBIC) 15 mg tablet, Take 15 mg by mouth daily, Disp: , Rfl:   •  methylPREDNISolone 4 MG tablet therapy pack, Use as directed on package (Patient taking differently: if needed Use as directed on package), Disp: 21 each, Rfl: 0  •  zolpidem (AMBIEN CR) 12.5 MG CR tablet, Take 1 tablet (12.5 mg total) by mouth daily at bedtime as needed for sleep, Disp: 30 tablet, Rfl: 0  Allergies   Allergen Reactions   • Morphine GI Intolerance and Other (See Comments)     vomiting     Vitals:    09/14/23 0843   BP: 130/84   Pulse: 100   Resp: 16   Temp: (!) 96.8 °F (36 °C)   SpO2: 98%       Physical Exam   Constitutional: General appearance: The Patient is well-developed and well-nourished who appears the stated age in no acute distress. Patient is pleasant and talkative. HEENT:  Normocephalic. Sclerae are anicteric. Mucous membranes are moist. Neck is supple without adenopathy. No JVD. Left thyroid does have palpable nodules. Chest: The lungs are clear to auscultation. Cardiac: Heart is regular rate. Abdomen: Abdomen is soft, non-tender, non-distended and without masses. Extremities: There is no clubbing or cyanosis. There is no edema. Symmetric. Neuro: Grossly nonfocal. Gait is normal.     Lymphatic: No evidence of cervical adenopathy bilaterally. Skin: Warm, anicteric. Psych:  Patient is pleasant and talkative. Breasts:      Pathology:  Final Diagnosis   A. & B. Thyroid, Left, nodule: (Thin-Prep and smears)  Atypia of undetermined significance ([place] Category III) - See note. Occasional large clusters of follicular cells and some with nuclear size variation. Hurthle cells also seen. Colloid, mixed inflammatory cells, and macrophages present.     Satisfactory for evaluation.     Note:  (1) As reported in the [address] for Reporting Thyroid Cytopathology*, this diagnostic category has demonstrated anywhere from 10-30% risk of malignancy being found in subsequent resections and/or FNA. This risk of malignancy is expected to change due to the usage of the surgical pathology diagnosis of “non-invasive follicular thyroid neoplasm with papillary-like nuclear features (NIFTP). ”  The anticipated risk of malignancy secondary to NIFTP is 6-18%. The manual reports that the usual management following this diagnosis is repeat FNA, molecular testing, or lobectomy.  Ultimately, clinical/imaging correlation for this patient is needed in arriving at the actual management plan.     *The [place] System for Reporting Thyroid Cytopathology, Shakila Fernandez.), 2018 (2nd ed.)      Electronically signed by Elis Craig MD on 8/28/2023 at 10:36 AM     Afirma: benign    Labs:      Imaging  IR biopsy thyroid with afirma    Result Date: 8/25/2023  Narrative: ULTRASOUND-GUIDED THYROID BIOPSY HISTORY: 47year-old with history of left thyroid nodule. COMPARISON: June 24, 2023 PROCEDURE AND FINDINGS: IV sedation was given per patient request due to anxiety. The left thyroid nodule identified on prior imaging was located sonographically. The nodule is not substantially changed in size. The neck was prepped and draped in normal sterile fashion. Under real-time sonographic guidance and local anesthesia, 5 passes with a 25 gauge needle were made through the nodule. 2 of the passes were obtained for Afirma. Cytopathology was present and deemed the specimens adequate for interpretation. The patient tolerated the procedure well. Postprocedure instructions were provided for the patient. Sedation time: 30 minutes     Impression: Successful ultrasound-guided thyroid biopsy of left thyroid nodule Workstation performed: [id number]     I personally reviewed and interpreted the above laboratory and imaging data. Discussion/Summary: 60-year-old female with left-sided thyroid nodules and dysphagia. Regarding the nodules, the larger 1 revealed atypia of undetermined significance. This has a 10 to 15% risk of malignancy on surgical excision. Afirma was benign. This has a 4% false negative risk. I would recommend observation based on the cancer risk, and that the lesion is less than 4 cm in size. I did discuss her symptoms. While these may be related to the nodules, this could also be related to her cervical disc issues. I discussed that surgical resection may not improve the dysphagia. She is scheduled for an injection next week. I told her to have the injection. If her symptoms improve she can be observed. If her symptoms do not improve we could consider surgical resection with hemithyroidectomy.   I explained the risks including bleeding, infection, recurrence, need for further surgery, wound complications, hypocalcemia and recurrent laryngeal nerve injury as well as the very real risk that her dysphagia will not improve since they may not be related to the thyroid nodules. She will plan on getting her nerve injection. I will see her again in 1 month, and at that time we will finalize our treatment plans regarding surgical intervention versus yearly follow-up with ultrasound. She is agreeable to this plan. All her questions were answered.

## 2023-09-26 DIAGNOSIS — G47.00 INSOMNIA, UNSPECIFIED TYPE: ICD-10-CM

## 2023-09-26 RX ORDER — ZOLPIDEM TARTRATE 12.5 MG/1
12.5 TABLET, FILM COATED, EXTENDED RELEASE ORAL
Qty: 30 TABLET | Refills: 0 | Status: SHIPPED | OUTPATIENT
Start: 2023-09-26

## 2023-10-25 DIAGNOSIS — G47.00 INSOMNIA, UNSPECIFIED TYPE: ICD-10-CM

## 2023-10-25 RX ORDER — ZOLPIDEM TARTRATE 12.5 MG/1
12.5 TABLET, FILM COATED, EXTENDED RELEASE ORAL
Qty: 30 TABLET | Refills: 0 | Status: SHIPPED | OUTPATIENT
Start: 2023-10-25

## 2023-11-24 DIAGNOSIS — G47.00 INSOMNIA, UNSPECIFIED TYPE: ICD-10-CM

## 2023-11-24 RX ORDER — ZOLPIDEM TARTRATE 12.5 MG/1
12.5 TABLET, FILM COATED, EXTENDED RELEASE ORAL
Qty: 30 TABLET | Refills: 0 | Status: SHIPPED | OUTPATIENT
Start: 2023-11-24

## 2023-12-26 DIAGNOSIS — G47.00 INSOMNIA, UNSPECIFIED TYPE: ICD-10-CM

## 2023-12-26 RX ORDER — ZOLPIDEM TARTRATE 12.5 MG/1
12.5 TABLET, FILM COATED, EXTENDED RELEASE ORAL
Qty: 30 TABLET | Refills: 0 | Status: SHIPPED | OUTPATIENT
Start: 2023-12-26

## 2024-01-09 ENCOUNTER — OFFICE VISIT (OUTPATIENT)
Age: 54
End: 2024-01-09
Payer: COMMERCIAL

## 2024-01-09 VITALS
OXYGEN SATURATION: 96 % | TEMPERATURE: 98.7 F | BODY MASS INDEX: 29.38 KG/M2 | HEART RATE: 82 BPM | HEIGHT: 67 IN | DIASTOLIC BLOOD PRESSURE: 80 MMHG | WEIGHT: 187.2 LBS | SYSTOLIC BLOOD PRESSURE: 114 MMHG

## 2024-01-09 DIAGNOSIS — Z12.12 ENCOUNTER FOR COLORECTAL CANCER SCREENING: ICD-10-CM

## 2024-01-09 DIAGNOSIS — Z12.11 ENCOUNTER FOR COLORECTAL CANCER SCREENING: ICD-10-CM

## 2024-01-09 DIAGNOSIS — M51.36 DDD (DEGENERATIVE DISC DISEASE), LUMBAR: ICD-10-CM

## 2024-01-09 DIAGNOSIS — R73.03 PRE-DIABETES: ICD-10-CM

## 2024-01-09 DIAGNOSIS — Z12.31 ENCOUNTER FOR SCREENING MAMMOGRAM FOR MALIGNANT NEOPLASM OF BREAST: Primary | ICD-10-CM

## 2024-01-09 DIAGNOSIS — G47.00 INSOMNIA, UNSPECIFIED TYPE: ICD-10-CM

## 2024-01-09 DIAGNOSIS — E78.49 OTHER HYPERLIPIDEMIA: ICD-10-CM

## 2024-01-09 DIAGNOSIS — F32.2 CURRENT SEVERE EPISODE OF MAJOR DEPRESSIVE DISORDER WITHOUT PSYCHOTIC FEATURES, UNSPECIFIED WHETHER RECURRENT (HCC): ICD-10-CM

## 2024-01-09 DIAGNOSIS — F17.210 CIGARETTE NICOTINE DEPENDENCE WITHOUT COMPLICATION: ICD-10-CM

## 2024-01-09 PROCEDURE — 99214 OFFICE O/P EST MOD 30 MIN: CPT | Performed by: NURSE PRACTITIONER

## 2024-01-09 NOTE — PROGRESS NOTES
Assessment/Plan:    Thyroid nodule  -continue to follow with surgical oncology     DDD (degenerative disc disease), lumbar  Follow up with [facility]     Pre-diabetes  HBA1C 5.9  Patient is to continue to work on diet and exercise.  Limit sugars and carbohydrate intake.    Avoid soda, juice, sweets, cookies, desserts, pasta, bread.   Eat more whole grains, exercised 30 min of cardio at least 3 times a week.  Also recommended daily foot exams to check for sores, and recommended yearly eye exams.       Other hyperlipidemia  Recommend OTC red yeast rice. Advised to increase fiber in diet. Recommend healthy lifestyle choices for your cholesterol.  Low fat/low cholesterol diet.  Limit/avoid red meat.  Eat more lean meat - chicken breast, ground turkey, fish.  Exercise 30 mins at least 5 times a week as tolerated.        Insomnia  -Patient currently on Ambien 12.5 mg daily at bedtime.  -Patient would like to wean off this medication we discussed tapering     Will get home sleep study    Cigarette nicotine dependence without complication  -encouraged smoking cessation         Depression Screening and Follow-up Plan: Patient's depression screening was positive with a PHQ-9 score of 5. Patient assessed for underlying major depression. Brief counseling provided and recommend additional follow-up/re-evaluation next office visit.     Tobacco Cessation Counseling: Tobacco cessation counseling was provided. The patient is sincerely urged to quit consumption of tobacco. She is ready to quit tobacco. Side effects of medication discussed. Patient agreed to medication.            Diagnoses and all orders for this visit:    Encounter for screening mammogram for malignant neoplasm of breast  -     Mammo screening bilateral w 3d & cad; Future    Encounter for colorectal cancer screening    Insomnia, unspecified type    Current severe episode of major depressive disorder without psychotic features, unspecified whether recurrent  (HCC)    DDD (degenerative disc disease), lumbar    Pre-diabetes    Other hyperlipidemia    Cigarette nicotine dependence without complication          Subjective:      Patient ID: Catrachita Poole is a 53 y.o. female.    Patient presents today to follow-up on chronic conditions.  She is due for fasting blood work.    Anxiety- currently controlled off medication       Primary insomnia-uncontrolled with Ambien, have tried different sleep aids in the past with no relief, has been waking up a night to use the bathroom and then she can not fall back to sleep   She reports that she has to nap for one hour every day    Hyperlipidemia-Cholesterol 248, , currently not on medication    Pre-diabetes- HBA1C 5.8    Chronic lumbar back pain- was seen previously by pain management    Started Chantix about a week ago, she is down to about 6 cigarettes a day          The following portions of the patient's history were reviewed and updated as appropriate: allergies, current medications, past family history, past medical history, past social history, past surgical history, and problem list.    Review of Systems   Constitutional:  Negative for activity change, appetite change, chills, diaphoresis and fever.   HENT:  Negative for congestion, ear discharge, ear pain, postnasal drip, rhinorrhea, sinus pressure, sinus pain and sore throat.    Eyes:  Negative for pain, discharge, itching and visual disturbance.   Respiratory:  Negative for cough, chest tightness, shortness of breath and wheezing.    Cardiovascular:  Negative for chest pain, palpitations and leg swelling.   Gastrointestinal:  Negative for abdominal pain, constipation, diarrhea, nausea and vomiting.   Endocrine: Negative for polydipsia, polyphagia and polyuria.   Genitourinary:  Negative for difficulty urinating, dysuria and urgency.   Musculoskeletal:  Negative for arthralgias, back pain and neck pain.   Skin:  Negative for rash and wound.   Neurological:   "Negative for dizziness, weakness, numbness and headaches.         Past Medical History:   Diagnosis Date    Adjustment disorder     Anxiety     Diabetes mellitus (HCC)     History of cocaine use     History of tobacco use     HLD (hyperlipidemia)     Lipoma of shoulder     PONV (postoperative nausea and vomiting)     Sesamoiditis 3/30/2020    Severe cervical dysplasia 6/16/2017    Tobacco dependence syndrome          Current Outpatient Medications:     meloxicam (MOBIC) 15 mg tablet, Take 15 mg by mouth daily, Disp: , Rfl:     zolpidem (AMBIEN CR) 12.5 MG CR tablet, Take 1 tablet (12.5 mg total) by mouth daily at bedtime as needed for sleep, Disp: 30 tablet, Rfl: 0    methylPREDNISolone 4 MG tablet therapy pack, Use as directed on package (Patient not taking: Reported on 1/9/2024), Disp: 21 each, Rfl: 0    Allergies   Allergen Reactions    Morphine GI Intolerance and Other (See Comments)     vomiting       Social History   Past Surgical History:   Procedure Laterality Date    BREAST SURGERY      Augment.     CERVICAL BIOPSY N/A 6/16/2017    Procedure: CONE BIOPSY;  Surgeon: Enrrique Harley MD;  Location[facility];  Service: Gynecology    EXPLORATORY LAPAROTOMY      Age 18, ovarian cysts    IR BIOPSY ABDOMEN  8/24/2023    MAMMO (HISTORICAL)      NECK SURGERY      Last Assessed 7/14/2016    SCAPULAR MASS EXCISION Left     lipoma     Family History   Problem Relation Age of Onset    Other Mother         No Pertinent Family History    Other Father         No Pertinent Family History       Objective:  /80 (BP Location: Left arm, Patient Position: Sitting, Cuff Size: Standard)   Pulse 82   Temp 98.7 °F (37.1 °C) (Temporal)   Ht 5' 6.65\" (1.693 m)   Wt 84.9 kg (187 lb 3.2 oz)   SpO2 96%   BMI 29.62 kg/m²     No results found for this or any previous visit (from the past 1344 hour(s)).         Physical Exam  Constitutional:       General: She is not in acute distress.     Appearance: She is well-developed. She " is not diaphoretic.   HENT:      Head: Normocephalic and atraumatic.      Right Ear: External ear normal.      Left Ear: External ear normal.      Nose: Nose normal.      Mouth/Throat:      Mouth: Mucous membranes are moist.      Pharynx: No oropharyngeal exudate or posterior oropharyngeal erythema.   Eyes:      General:         Right eye: No discharge.         Left eye: No discharge.      Conjunctiva/sclera: Conjunctivae normal.      Pupils: Pupils are equal, round, and reactive to light.   Neck:      Thyroid: No thyromegaly.   Cardiovascular:      Rate and Rhythm: Normal rate and regular rhythm.      Heart sounds: Normal heart sounds. No murmur heard.     No friction rub. No gallop.   Pulmonary:      Effort: Pulmonary effort is normal. No respiratory distress.      Breath sounds: Normal breath sounds. No stridor. No wheezing or rales.   Abdominal:      General: Bowel sounds are normal. There is no distension.      Palpations: Abdomen is soft.      Tenderness: There is no abdominal tenderness.   Musculoskeletal:      Cervical back: Normal range of motion and neck supple.   Lymphadenopathy:      Cervical: No cervical adenopathy.   Skin:     General: Skin is warm and dry.      Findings: No erythema or rash.   Neurological:      Mental Status: She is alert and oriented to person, place, and time.   Psychiatric:         Behavior: Behavior normal.         Thought Content: Thought content normal.         Judgment: Judgment normal.

## 2024-01-10 NOTE — ASSESSMENT & PLAN NOTE
-Patient currently on Ambien 12.5 mg daily at bedtime.  -Patient would like to wean off this medication we discussed tapering     Will get home sleep study

## 2024-01-10 NOTE — ASSESSMENT & PLAN NOTE
HBA1C 5.9  Patient is to continue to work on diet and exercise.  Limit sugars and carbohydrate intake.    Avoid soda, juice, sweets, cookies, desserts, pasta, bread.   Eat more whole grains, exercised 30 min of cardio at least 3 times a week.  Also recommended daily foot exams to check for sores, and recommended yearly eye exams.

## 2024-01-10 NOTE — ASSESSMENT & PLAN NOTE
Recommend OTC red yeast rice. Advised to increase fiber in diet. Recommend healthy lifestyle choices for your cholesterol.  Low fat/low cholesterol diet.  Limit/avoid red meat.  Eat more lean meat - chicken breast, ground turkey, fish.  Exercise 30 mins at least 5 times a week as tolerated.

## 2024-01-24 DIAGNOSIS — G47.00 INSOMNIA, UNSPECIFIED TYPE: ICD-10-CM

## 2024-01-24 RX ORDER — ZOLPIDEM TARTRATE 12.5 MG/1
12.5 TABLET, FILM COATED, EXTENDED RELEASE ORAL
Qty: 30 TABLET | Refills: 0 | Status: SHIPPED | OUTPATIENT
Start: 2024-01-24

## 2024-02-22 DIAGNOSIS — G47.00 INSOMNIA, UNSPECIFIED TYPE: ICD-10-CM

## 2024-02-22 RX ORDER — ZOLPIDEM TARTRATE 12.5 MG/1
12.5 TABLET, FILM COATED, EXTENDED RELEASE ORAL
Qty: 30 TABLET | Refills: 0 | Status: SHIPPED | OUTPATIENT
Start: 2024-02-22

## 2024-03-12 ENCOUNTER — OFFICE VISIT (OUTPATIENT)
Age: 54
End: 2024-03-12
Payer: COMMERCIAL

## 2024-03-12 VITALS
DIASTOLIC BLOOD PRESSURE: 74 MMHG | SYSTOLIC BLOOD PRESSURE: 120 MMHG | OXYGEN SATURATION: 98 % | BODY MASS INDEX: 30.98 KG/M2 | TEMPERATURE: 98.8 F | HEIGHT: 66 IN | HEART RATE: 86 BPM | WEIGHT: 192.8 LBS

## 2024-03-12 DIAGNOSIS — M51.36 DDD (DEGENERATIVE DISC DISEASE), LUMBAR: ICD-10-CM

## 2024-03-12 DIAGNOSIS — F33.0 MILD EPISODE OF RECURRENT MAJOR DEPRESSIVE DISORDER (HCC): Primary | ICD-10-CM

## 2024-03-12 DIAGNOSIS — M54.2 NECK PAIN: ICD-10-CM

## 2024-03-12 PROCEDURE — 99214 OFFICE O/P EST MOD 30 MIN: CPT | Performed by: NURSE PRACTITIONER

## 2024-03-12 RX ORDER — DIAZEPAM 5 MG/1
5 TABLET ORAL
COMMUNITY
Start: 2024-02-20 | End: 2024-03-12

## 2024-03-12 RX ORDER — VARENICLINE TARTRATE 0.5 MG/1
1 TABLET, FILM COATED ORAL DAILY
COMMUNITY

## 2024-03-12 RX ORDER — DULOXETIN HYDROCHLORIDE 20 MG/1
20 CAPSULE, DELAYED RELEASE ORAL DAILY
Qty: 90 CAPSULE | Refills: 0 | Status: SHIPPED | OUTPATIENT
Start: 2024-03-12

## 2024-03-12 RX ORDER — DICLOFENAC SODIUM 75 MG/1
75 TABLET, DELAYED RELEASE ORAL 2 TIMES DAILY
COMMUNITY
Start: 2024-03-06

## 2024-03-12 NOTE — PROGRESS NOTES
Assessment/Plan:    DDD (degenerative disc disease), lumbar  -Continue to follow with pain management and neurosurgery   -will start Cymbalta      Neck pain  -see plan for DDD lumbar     Mild episode of recurrent major depressive disorder (HCC)  -likely in the setting of chronic pain from neck and back   -Will start Cymbalta, advised patient not to abruptly stop this medication and that this medication may take 4 to 6 weeks to start working              Diagnoses and all orders for this visit:    Mild episode of recurrent major depressive disorder (HCC)  -     DULoxetine (CYMBALTA) 20 mg capsule; Take 1 capsule (20 mg total) by mouth daily    DDD (degenerative disc disease), lumbar    Neck pain    Other orders  -     diclofenac (VOLTAREN) 75 mg EC tablet; Take 75 mg by mouth 2 (two) times a day  -     Discontinue: diazepam (VALIUM) 5 mg tablet; Take 5 mg by mouth (Patient not taking: Reported on 3/12/2024)  -     varenicline (CHANTIX) 0.5 mg tablet; Take 1 mg by mouth daily          Subjective:      Patient ID: Catrachita Poole is a 54 y.o. female.    Patient presents today with chronic low back and neck pain.  Of note patient does have history of prior lumbar discectomy about 25 years ago.  She was unsure of what level that she was operated on.  She reports with low back pain that radiates into her left lower extremity and into her toes with numbness.  She also notes weakness in her left foot and toes which has been ongoing for years.  She reports that the symptoms have been ongoing since 2029.  She states that the symptoms are significantly impacting her quality of life and affecting her ability to perform ADLs.    She is had extensive conservative treatment in the form of medications including Tylenol ibuprofen, meloxicam, tenacity and and oral steroids.  She has completed physical therapy multiple times and continued with home exercise program for the past couple months.  She has tried multiple injections,  steroid and epidural through [facility] pain management.     Additionally, she had a C5-7 ACDF with Dr. Whitmore. She does continue to note chronic posterior neck pain. She denies any radiating upper extremity pain, numbness, or tingling.     Spine and Pain Dr. Sam   Has seen Neurosurgery with      She has had two cervical fusions, has had worsening neck pain about 3.5 years ago    She reports some tingling to her left hand     [id number] BID, helps mildly     She reports that she has been screwing up at work because she can not focus due to the pain     Also follows [facility], Dr. Ferguson neurosurgery, he is suggesting surgery to lumbar spine         The following portions of the patient's history were reviewed and updated as appropriate: allergies, current medications, past family history, past medical history, past social history, past surgical history, and problem list.    Review of Systems   Constitutional:  Negative for activity change, appetite change, chills, diaphoresis and fever.   HENT:  Negative for congestion, ear discharge, ear pain, postnasal drip, rhinorrhea, sinus pressure, sinus pain and sore throat.    Eyes:  Negative for pain, discharge, itching and visual disturbance.   Respiratory:  Negative for cough, chest tightness, shortness of breath and wheezing.    Cardiovascular:  Negative for chest pain, palpitations and leg swelling.   Gastrointestinal:  Negative for abdominal pain, constipation, diarrhea, nausea and vomiting.   Endocrine: Negative for polydipsia, polyphagia and polyuria.   Genitourinary:  Negative for difficulty urinating, dysuria and urgency.   Musculoskeletal:  Positive for back pain and neck pain. Negative for arthralgias.   Skin:  Negative for rash and wound.   Neurological:  Negative for dizziness, weakness, numbness and headaches.   Psychiatric/Behavioral:  Positive for dysphoric mood and sleep disturbance.          Past Medical History:   Diagnosis Date    Adjustment  "disorder     Anxiety     Diabetes mellitus (HCC)     History of cocaine use     History of tobacco use     HLD (hyperlipidemia)     Lipoma of shoulder     PONV (postoperative nausea and vomiting)     Sesamoiditis 3/30/2020    Severe cervical dysplasia 6/16/2017    Tobacco dependence syndrome          Current Outpatient Medications:     diclofenac (VOLTAREN) 75 mg EC tablet, Take 75 mg by mouth 2 (two) times a day, Disp: , Rfl:     DULoxetine (CYMBALTA) 20 mg capsule, Take 1 capsule (20 mg total) by mouth daily, Disp: 90 capsule, Rfl: 0    varenicline (CHANTIX) 0.5 mg tablet, Take 1 mg by mouth daily, Disp: , Rfl:     zolpidem (AMBIEN CR) 12.5 MG CR tablet, Take 1 tablet (12.5 mg total) by mouth daily at bedtime as needed for sleep, Disp: 30 tablet, Rfl: 0    Allergies   Allergen Reactions    Morphine GI Intolerance and Other (See Comments)     vomiting       Social History   Past Surgical History:   Procedure Laterality Date    BREAST SURGERY      Augment.     CERVICAL BIOPSY N/A 6/16/2017    Procedure: CONE BIOPSY;  Surgeon: Enrrique Harley MD;  Location: [facility];  Service: Gynecology    EXPLORATORY LAPAROTOMY      Age 18, ovarian cysts    IR BIOPSY ABDOMEN  8/24/2023    MAMMO (HISTORICAL)      NECK SURGERY      Last Assessed 7/14/2016    SCAPULAR MASS EXCISION Left     lipoma     Family History   Problem Relation Age of Onset    Other Mother         No Pertinent Family History    Other Father         No Pertinent Family History       Objective:  /74 (BP Location: Left arm, Patient Position: Sitting, Cuff Size: Standard)   Pulse 86   Temp 98.8 °F (37.1 °C) (Temporal)   Ht 5' 6\" (1.676 m)   Wt 87.5 kg (192 lb 12.8 oz)   SpO2 98%   BMI 31.12 kg/m²     No results found for this or any previous visit (from the past 1344 hour(s)).         Physical Exam  Constitutional:       General: She is not in acute distress.     Appearance: She is well-developed. She is not diaphoretic.   HENT:      Head: " Normocephalic and atraumatic.      Right Ear: External ear normal.      Left Ear: External ear normal.      Nose: Nose normal.      Mouth/Throat:      Mouth: Mucous membranes are moist.      Pharynx: No oropharyngeal exudate or posterior oropharyngeal erythema.   Eyes:      General:         Right eye: No discharge.         Left eye: No discharge.      Conjunctiva/sclera: Conjunctivae normal.      Pupils: Pupils are equal, round, and reactive to light.   Neck:      Thyroid: No thyromegaly.   Cardiovascular:      Rate and Rhythm: Normal rate and regular rhythm.      Heart sounds: Normal heart sounds. No murmur heard.     No friction rub. No gallop.   Pulmonary:      Effort: Pulmonary effort is normal. No respiratory distress.      Breath sounds: Normal breath sounds. No stridor. No wheezing or rales.   Abdominal:      General: Bowel sounds are normal. There is no distension.      Palpations: Abdomen is soft.      Tenderness: There is no abdominal tenderness.   Musculoskeletal:      Cervical back: Normal range of motion and neck supple.   Lymphadenopathy:      Cervical: No cervical adenopathy.   Skin:     General: Skin is warm and dry.      Findings: No erythema or rash.   Neurological:      Mental Status: She is alert and oriented to person, place, and time.   Psychiatric:         Behavior: Behavior normal.         Thought Content: Thought content normal.         Judgment: Judgment normal.

## 2024-03-12 NOTE — ASSESSMENT & PLAN NOTE
-likely in the setting of chronic pain from neck and back   -Will start Cymbalta, advised patient not to abruptly stop this medication and that this medication may take 4 to 6 weeks to start working

## 2024-03-25 DIAGNOSIS — G47.00 INSOMNIA, UNSPECIFIED TYPE: ICD-10-CM

## 2024-03-25 RX ORDER — ZOLPIDEM TARTRATE 12.5 MG/1
12.5 TABLET, FILM COATED, EXTENDED RELEASE ORAL
Qty: 30 TABLET | Refills: 0 | Status: SHIPPED | OUTPATIENT
Start: 2024-03-25

## 2024-04-01 DIAGNOSIS — F33.0 MILD EPISODE OF RECURRENT MAJOR DEPRESSIVE DISORDER (HCC): ICD-10-CM

## 2024-04-01 RX ORDER — DULOXETIN HYDROCHLORIDE 30 MG/1
30 CAPSULE, DELAYED RELEASE ORAL DAILY
Qty: 90 CAPSULE | Refills: 1 | Status: SHIPPED | OUTPATIENT
Start: 2024-04-01

## 2024-04-12 ENCOUNTER — PATIENT MESSAGE (OUTPATIENT)
Age: 54
End: 2024-04-12

## 2024-04-16 NOTE — PATIENT COMMUNICATION
Patient spoke with one of the MR in office in regards to her insurance. On 04/12/2024, her insurance was still effective as shown in the RTE. Patient was informed however, she still ended cancelling her appointment.

## 2024-04-22 DIAGNOSIS — G47.00 INSOMNIA, UNSPECIFIED TYPE: ICD-10-CM

## 2024-04-22 RX ORDER — ZOLPIDEM TARTRATE 12.5 MG/1
12.5 TABLET, FILM COATED, EXTENDED RELEASE ORAL
Qty: 30 TABLET | Refills: 0 | Status: SHIPPED | OUTPATIENT
Start: 2024-04-22

## 2024-05-23 DIAGNOSIS — G47.00 INSOMNIA, UNSPECIFIED TYPE: ICD-10-CM

## 2024-05-23 RX ORDER — ZOLPIDEM TARTRATE 12.5 MG/1
12.5 TABLET, FILM COATED, EXTENDED RELEASE ORAL
Qty: 30 TABLET | Refills: 0 | Status: SHIPPED | OUTPATIENT
Start: 2024-05-23

## 2024-06-07 DIAGNOSIS — E66.3 OVERWEIGHT (BMI 25.0-29.9): Primary | ICD-10-CM

## 2024-06-12 ENCOUNTER — TELEPHONE (OUTPATIENT)
Age: 54
End: 2024-06-12

## 2024-06-12 ENCOUNTER — OFFICE VISIT (OUTPATIENT)
Dept: BARIATRICS | Facility: CLINIC | Age: 54
End: 2024-06-12
Payer: COMMERCIAL

## 2024-06-12 VITALS
DIASTOLIC BLOOD PRESSURE: 70 MMHG | HEART RATE: 82 BPM | TEMPERATURE: 97.9 F | SYSTOLIC BLOOD PRESSURE: 114 MMHG | BODY MASS INDEX: 30.39 KG/M2 | HEIGHT: 65 IN | WEIGHT: 182.4 LBS

## 2024-06-12 DIAGNOSIS — E55.9 VITAMIN D DEFICIENCY: ICD-10-CM

## 2024-06-12 DIAGNOSIS — E78.49 OTHER HYPERLIPIDEMIA: ICD-10-CM

## 2024-06-12 DIAGNOSIS — F17.210 CIGARETTE NICOTINE DEPENDENCE WITHOUT COMPLICATION: ICD-10-CM

## 2024-06-12 DIAGNOSIS — E66.9 OBESITY, CLASS I, BMI 30-34.9: Primary | ICD-10-CM

## 2024-06-12 DIAGNOSIS — R73.03 PRE-DIABETES: ICD-10-CM

## 2024-06-12 DIAGNOSIS — E04.1 THYROID NODULE: ICD-10-CM

## 2024-06-12 PROBLEM — E66.3 OVERWEIGHT (BMI 25.0-29.9): Status: RESOLVED | Noted: 2021-10-01 | Resolved: 2024-06-12

## 2024-06-12 PROBLEM — U07.1 COVID-19: Status: RESOLVED | Noted: 2022-08-17 | Resolved: 2024-06-12

## 2024-06-12 PROCEDURE — 99244 OFF/OP CNSLTJ NEW/EST MOD 40: CPT | Performed by: INTERNAL MEDICINE

## 2024-06-12 NOTE — PROGRESS NOTES
Assessment/Plan:  Catrachita was seen today for consult.    Diagnoses and all orders for this visit:    Obesity, Class I, BMI 30-34.9  -     Ambulatory Referral to Weight Management  -     Hemoglobin A1C; Future  -     TSH, 3rd generation with Free T4 reflex; Future  -     Vitamin D 25 hydroxy; Future  -     TSH, 3rd generation with Free T4 reflex  -     Vitamin D 25 hydroxy  -     metFORMIN (GLUCOPHAGE) 500 mg tablet; Take 1 tablet (500 mg total) by mouth 2 (two) times a day with meals    Thyroid nodule  -     TSH, 3rd generation with Free T4 reflex; Future  -     TSH, 3rd generation with Free T4 reflex    Cigarette nicotine dependence without complication    Other hyperlipidemia    Pre-diabetes  -     Hemoglobin A1C; Future    Vitamin D deficiency  -     Vitamin D 25 hydroxy; Future  -     Vitamin D 25 hydroxy       - Discussed options of HealthyCORE-Intensive Lifestyle Intervention Program, Very Low Calorie Diet-VLCD, and Conservative Program and the role of weight loss medications.  - Explained the importance of making lifestyle changes first before starting anti-obesity medications.  - Patient should demonstrate lifestyle changes first before anti-obesity medication initiated.   - Patient is interested in pursuing Conservative Program  - Initial weight loss goal of 5-10% weight loss for improved health as studies have shown this is where we see the greatest impact on improving health and decreasing risk of obesity related conditions.  - Weight loss can improve patient's co-morbid conditions and/or prevent weight-related complications.  - Labs reviewed: As below.      General Recommendations:  Nutrition:  Eat breakfast daily.  Do not skip meals.     Food log (ie.) www.[employer].com, sparkpeople.com, loseit.com, [employer].com, etc.    Practice mindful eating.  Be sure to set aside time to eat, eat slowly, and savor your food.    Hydration:    At least 64oz of water daily.  No sugar sweetened beverages.  No  juice (eat the fruit instead).    Exercise:  Studies have shown that the ideal exercise goal is somewhere between 150 to 300 minutes of moderate intensity exercise a week.  Start with exercising 10 minutes every other day and gradually increase physical activity with a goal of at least 150 minutes of moderate intensity exercise a week, divided over at least 3 days a week.  An example of this would be exercising 30 minutes a day, 5 days a week.  Resistance training can increase muscle mass and increase our resting metabolic rate.   FULL BODY resistance training is recommended 2-3 times a week.  Do not do this on consecutive days to allow for muscle recovery.    Aim for a bare minimum 5000 steps, even on days you do not exercise.    Monitoring:   Weigh yourself daily.  If this causes undue stress, then just weigh yourself once a week.  Weigh yourself the same time of the day with the same amount of clothing on.  Preferably this should be done after waking up, before you eat, and with no clothing or minimal clothing on.    Specific Goals:  Have lab test done.    Food log (ie.) www.myfitnesspal.com,sparkpeople.com,loseit.com,calorieking.com,etc.   Calorie goal:  6543-6695 calories a day (Provided with meal plan to follow).  I recommend a meal plan package with body compositions.    No sugary beverages. At least 64oz of water daily.    Gradually increase physical activity to a goal of 5 days per week for 30 minutes of MODERATE intensity PLUS 2 days per week of FULL BODY resistance training    START metformin 500mg once a day with dinner for 2 weeks, then increase to metformin 500mg twice a day (breakfast and dinner.)    Return visit:  Nurse visit in 6 weeks.   Follow-up at our [place] office in 4 months    We discussed consideration to increase metformin at the nurse visit.  If this is not effective then she is most interested in Contrave as another option.      Total time spent reviewing chart, interviewing patient,  examining patient, discussing plan, answering all questions, and documentin min.       ______________________________________________________________________    Subjective:   Chief Complaint   Patient presents with    Consult     MWM - Consult  Wt goal  waist  S/B  .        HPI: Catrachita Poole  is a 54 y.o. female with history of prediabetes, hyperlipidemia, and excess weight, here to pursue weight loss management.  Previous notes and records have been reviewed.    HPI  Wt Readings from Last 20 Encounters:   24 82.7 kg (182 lb 6.4 oz)   24 87.5 kg (192 lb 12.8 oz)   24 84.9 kg (187 lb 3.2 oz)   23 84.8 kg (187 lb)   23 83.9 kg (185 lb)   23 83.2 kg (183 lb 6.4 oz)   23 82.1 kg (181 lb)   23 81.2 kg (179 lb)   10/03/22 84.8 kg (187 lb)   22 83.5 kg (184 lb)   10/01/21 83.7 kg (184 lb 9.6 oz)   21 85.4 kg (188 lb 3.2 oz)   20 72.6 kg (160 lb)   20 72.6 kg (160 lb)   19 83.5 kg (184 lb)   18 77.8 kg (171 lb 9.6 oz)   18 77.5 kg (170 lb 12.8 oz)   17 79.4 kg (175 lb 2.1 oz)   17 77.1 kg (170 lb)   07/10/17 76.7 kg (169 lb 2.1 oz)     Excess Weight:  Highest weight: 192 (3/24)  Current weight: 182  What has been tried: Diet and Exercise  Low carb, high protein.    Contributing factors: night time snacking, hunger  Associated symptoms and effects: comorbid conditions prediabetes, HLD, low back pain    Dining out:  once a week  Hydration:  Water  64+  Coffee (plain)  Alcohol:  2-4 glasses a week  Smoking:  Quit smoking  Exercise: Limited due to back pain  Weight Monitoring:  Yes  Sleep:  6-8 hours  Occupation:  Not working currently  ( up until 2 months ago)      Past Medical History:   Diagnosis Date    Adjustment disorder     Anxiety     Diabetes mellitus (HCC)     History of cocaine use     History of tobacco use     HLD (hyperlipidemia)     Lipoma of shoulder     PONV (postoperative nausea and  "vomiting)     Sesamoiditis 3/30/2020    Severe cervical dysplasia 6/16/2017    Tobacco dependence syndrome      Patient denies personal and family history of pancreatitis, thyroid cancer, MEN-2 tumors.  Denies any hx of glaucoma, seizures, kidney stones, gallstones.  Denies Hx of CAD, PAD, palpitations, arrhythmia.   Denies uncontrolled anxiety or depression, suicidal behavior or thinking , insomnia or sleep disturbance.   Past Surgical History:   Procedure Laterality Date    BREAST SURGERY      Augment.     CERVICAL BIOPSY N/A 6/16/2017    Procedure: CONE BIOPSY;  Surgeon: Enrrique Harley MD;  [facility];  Service: Gynecology    EXPLORATORY LAPAROTOMY      Age 18, ovarian cysts    IR BIOPSY ABDOMEN  8/24/2023    MAMMO (HISTORICAL)      NECK SURGERY      Last Assessed 7/14/2016    SCAPULAR MASS EXCISION Left     lipoma     The following portions of the patient's history were reviewed and updated as appropriate: allergies, current medications, past family history, past medical history, past social history, past surgical history, and problem list.    Review Of Systems:  Review of Systems   Constitutional:  Negative for activity change, appetite change, fatigue and fever.   Respiratory:  Negative for cough and shortness of breath.    Cardiovascular:  Negative for chest pain, palpitations and leg swelling.   Gastrointestinal:  Negative for abdominal pain, constipation, diarrhea, nausea and vomiting.   Endocrine: Negative for cold intolerance and heat intolerance.   Genitourinary:  Negative for difficulty urinating and dysuria.   Musculoskeletal:  Negative for arthralgias, back pain and gait problem.   Skin:  Negative for pallor and rash.   Neurological:  Negative for headaches.   Psychiatric/Behavioral:  Negative for dysphoric mood, sleep disturbance and suicidal ideas (or HI). The patient is not nervous/anxious.        Objective:  /70   Pulse 82   Temp 97.9 °F (36.6 °C) (Tympanic)   Ht 5' 5\" (1.651 " m)   Wt 82.7 kg (182 lb 6.4 oz)   BMI 30.35 kg/m²   Physical Exam  Vitals and nursing note reviewed.   Constitutional:       General: She is not in acute distress.     Appearance: Normal appearance. She is not ill-appearing or diaphoretic.   Eyes:      General: No scleral icterus.  Cardiovascular:      Rate and Rhythm: Normal rate and regular rhythm.      Pulses: Normal pulses.      Heart sounds: No murmur heard.  Pulmonary:      Effort: Pulmonary effort is normal. No respiratory distress.      Breath sounds: Normal breath sounds. No wheezing or rhonchi.   Abdominal:      General: Bowel sounds are normal. There is no distension.      Palpations: Abdomen is soft. There is no mass.      Tenderness: There is no abdominal tenderness.   Musculoskeletal:      Cervical back: Neck supple.      Right lower leg: No edema.      Left lower leg: No edema.   Lymphadenopathy:      Cervical: No cervical adenopathy.   Skin:     Capillary Refill: Capillary refill takes less than 2 seconds.      Findings: No lesion or rash.   Neurological:      Mental Status: She is alert and oriented to person, place, and time.      Gait: Gait normal.   Psychiatric:         Mood and Affect: Mood normal.         Behavior: Behavior normal.         Labs and Imaging  Recent labs and imaging have been personally reviewed.  Lab Results   Component Value Date    WBC 6.16 11/12/2018    HGB 14.0 11/12/2018    HCT 43.9 11/12/2018    MCV 98 11/12/2018     11/12/2018     Lab Results   Component Value Date    SODIUM 141 06/13/2023    K 4.1 06/13/2023     06/13/2023    CO2 24 06/13/2023    AGAP 10 06/13/2023    BUN 16 06/13/2023    CREATININE 1.04 06/13/2023    GLUC 91 06/13/2023    GLUF 84 11/12/2018    CALCIUM 9.3 06/13/2023    AST 14 06/13/2023    ALT 12 06/13/2023    ALKPHOS 48 06/13/2023    TP 6.8 06/13/2023    TBILI 0.4 06/13/2023    EGFR 65 06/13/2023     Lab Results   Component Value Date    HGBA1C 5.8 (H) 06/13/2023     Lab Results    Component Value Date    KYA1KXWBNNWY 0.490 11/12/2018    TSH 1.20 06/13/2023     Lab Results   Component Value Date    CHOLESTEROL 237 (H) 11/12/2018     Lab Results   Component Value Date    HDL 77 (H) 11/12/2018     Lab Results   Component Value Date    TRIG 153 (H) 11/12/2018     Lab Results   Component Value Date    LDLCALC 129 (H) 11/12/2018   US thyroid  Status: Final result     PACS Images     Show images for US thyroid    US thyroid: Result Notes     Danielle Hammonds RN  7/3/2023  1:40 PM EDT       THOMAS Mcmanus RN  Called and discussed with patient    Danielle Hammonds RN  7/3/2023  1:24 PM EDT       Spoke with patient and gave her the results and rcommendations. She asked if there was anyway she can speak with you directly about this. She mentioend to me that she would like to go right to the removal of this nodule and does not wish to get the biopsy. She wants to speak with you directly about this. Thank you    Danielle Hammonds RN  7/3/2023 12:13 PM EDT       THOMAS Mcmanus RN  Please call the patient regarding her abnormal result.  She has a nodule that is suggested to have fine needle aspiration.  I will place the orders she would need to call and schedule.     Called and LMOM told her to ask for Danielle when she calls.            Study Result    Narrative & Impression   THYROID ULTRASOUND     INDICATION:    Z86.39: Personal history of other endocrine, nutritional and metabolic disease.     COMPARISON:  None     TECHNIQUE:   Ultrasound of the thyroid was performed with a high frequency linear transducer in transverse and sagittal planes including volumetric imaging sweeps as well as traditional still imaging technique.     FINDINGS:  Normal homogeneous smooth echotexture.     Right lobe: 6.4 x 1.8 x 2.0 cm. Volume 11.1 mL  Left lobe:  6.1 x 1.9 x 2.7 cm. Volume 15.3 mL  Isthmus: 0.2  cm.     Nodule #1.  Image 55.  LEFT  midgland nodule measuring 2.9 x 1.6 x 2.3 cm.  COMPOSITION:  1 point, mixed cystic and solid.  ECHOGENICITY:  1 point, hyperechoic or isoechoic.  SHAPE:  0 points, wider-than-tall.  MARGIN: 0 points, smooth.  ECHOGENIC FOCI:  3 points, punctate echogenic foci.  TI-RADS Classification: TR 5 (7 or > points), Highly suspicious.  FNA if > 1 cm.  Follow if > 0.5 cm.     Nodule #2.  Image 51.  LEFT upper pole nodule measuring 1.1 x 0.9 x 1.0 cm.  COMPOSITION:  2 points, solid or almost completely solid .  ECHOGENICITY:  1 point, hyperechoic or isoechoic.  SHAPE:  0 points, wider-than-tall.  MARGIN: 0 points, smooth.  ECHOGENIC FOCI:  0 points, none or large comet-tail artifacts.  TI-RADS Classification: TR 3 (3 points), FNA if >2.5 cm.  Follow if >1.5 cm.           There are additional nodules of lesser size and/or TI-RADS score.  At the time of follow-up for the above described dominant nodules, these will be reevaluated in detailed at that time if needed.     IMPRESSION:     Left-sided 2.9 cm TI-RADS 5 nodule meets criteria for fine-needle aspiration.        Reference: ACR Thyroid Imaging, Reporting and Data System (TI-RADS): White Paper of the ACR TI-RADS Committee. J AM Subha Radiol 2017;14:587-595. (additional recommendations based on American Thyroid Association 2015 guidelines.)     The study was marked in EPIC for significant notification.     Workstation performed: [id number]     Pathology from 9/23 indicates low risk for Ca (4%)

## 2024-06-12 NOTE — TELEPHONE ENCOUNTER
Patient called in to confirm if she has a co pay for her appointment. Advised patient there was no co-pay. Patient inquiring about how the appointment will go. Advised patient it is a consultation and to ask the provider questions that she has regarding her weight loss journey.

## 2024-06-12 NOTE — PATIENT INSTRUCTIONS
General Recommendations:  Nutrition:  Eat breakfast daily.  Do not skip meals.     Food log (ie.) www.myfitnesspal.com, sparkpeople.com, loseit.com, calorieking.com, etc.    Practice mindful eating.  Be sure to set aside time to eat, eat slowly, and savor your food.    Hydration:    At least 64oz of water daily.  No sugar sweetened beverages.  No juice (eat the fruit instead).    Exercise:  Studies have shown that the ideal exercise goal is somewhere between 150 to 300 minutes of moderate intensity exercise a week.  Start with exercising 10 minutes every other day and gradually increase physical activity with a goal of at least 150 minutes of moderate intensity exercise a week, divided over at least 3 days a week.  An example of this would be exercising 30 minutes a day, 5 days a week.  Resistance training can increase muscle mass and increase our resting metabolic rate.   FULL BODY resistance training is recommended 2-3 times a week.  Do not do this on consecutive days to allow for muscle recovery.    Aim for a bare minimum 5000 steps, even on days you do not exercise.    Monitoring:   Weigh yourself daily.  If this causes undue stress, then just weigh yourself once a week.  Weigh yourself the same time of the day with the same amount of clothing on.  Preferably this should be done after waking up, before you eat, and with no clothing or minimal clothing on.    Specific Goals:  Have lab test done.    Food log (ie.) www.myfitnesspal.com,sparkpeople.com,loseit.com,calorieking.com,etc.   Calorie goal:  8727-3920 calories a day (Provided with meal plan to follow).  I recommend a meal plan package with body compositions.    No sugary beverages. At least 64oz of water daily.    Gradually increase physical activity to a goal of 5 days per week for 30 minutes of MODERATE intensity PLUS 2 days per week of FULL BODY resistance training    START metformin 500mg once a day with dinner for 2 weeks, then increase to  metformin 500mg twice a day (breakfast and dinner.)    Return visit:  Nurse visit in 6 weeks.   Follow-up at our [place] office in 4 months

## 2024-06-13 ENCOUNTER — OFFICE VISIT (OUTPATIENT)
Dept: INTERNAL MEDICINE CLINIC | Age: 54
End: 2024-06-13
Payer: COMMERCIAL

## 2024-06-13 VITALS
BODY MASS INDEX: 30.66 KG/M2 | TEMPERATURE: 99 F | HEIGHT: 65 IN | HEART RATE: 83 BPM | OXYGEN SATURATION: 97 % | SYSTOLIC BLOOD PRESSURE: 122 MMHG | DIASTOLIC BLOOD PRESSURE: 64 MMHG | WEIGHT: 184 LBS

## 2024-06-13 DIAGNOSIS — M54.50 ACUTE BILATERAL LOW BACK PAIN WITHOUT SCIATICA: Primary | ICD-10-CM

## 2024-06-13 DIAGNOSIS — M51.36 DDD (DEGENERATIVE DISC DISEASE), LUMBAR: ICD-10-CM

## 2024-06-13 DIAGNOSIS — F17.210 CIGARETTE NICOTINE DEPENDENCE WITHOUT COMPLICATION: ICD-10-CM

## 2024-06-13 DIAGNOSIS — R73.03 PRE-DIABETES: ICD-10-CM

## 2024-06-13 DIAGNOSIS — F33.0 MILD EPISODE OF RECURRENT MAJOR DEPRESSIVE DISORDER (HCC): ICD-10-CM

## 2024-06-13 DIAGNOSIS — Z12.11 SCREENING FOR MALIGNANT NEOPLASM OF COLON: ICD-10-CM

## 2024-06-13 LAB
25(OH)D3+25(OH)D2 SERPL-MCNC: 34 NG/ML (ref 30–100)
EST. AVERAGE GLUCOSE BLD GHB EST-MCNC: 123 MG/DL
HBA1C MFR BLD: 5.9 %
TSH SERPL-ACNC: 0.92 UIU/ML (ref 0.45–5.33)

## 2024-06-13 PROCEDURE — 99214 OFFICE O/P EST MOD 30 MIN: CPT | Performed by: INTERNAL MEDICINE

## 2024-06-13 RX ORDER — PREDNISONE 20 MG/1
20 TABLET ORAL DAILY
Qty: 7 TABLET | Refills: 0 | Status: SHIPPED | OUTPATIENT
Start: 2024-06-13

## 2024-06-13 NOTE — PROGRESS NOTES
Assessment/Plan:    Acute on chronic low back pain  - With a history of grade 1 spondylolisthesis at L5-S1 with disc space narrowing.  -Unfortunately I could not assess the results of the MRI of the lumbar region but she does have an appointment to follow-up with a spine surgeon at [facility]  -Will start patient on prednisone 20 mg daily with breakfast  -Patient was counseled to continue with her diclofenac which I am sure that will help with her neck and her back.  -She was counseled to use a heating pad for her back.  -She declines prescription for a muscle relaxant    Prediabetes  -Stable  -Prednisone will increase blood glucose briefly but this will likely resolve after prednisone dose is complete  -Continue with a diet low in carbohydrates and simple sugars and continue with increased exercise  -We will follow-up with results of updated hemoglobin A1c    Depression  -Depression can be worsened by prednisone  -Patient should monitor herself for worsening symptoms and follow-up with PCP as needed.    Nicotine dependence  -Currently resolved, as of 6 months ago.  -Patient will benefit from up restarting  -Will resolve this problem     Diagnoses and all orders for this visit:    Acute bilateral low back pain without sciatica  -     predniSONE 20 mg tablet; Take 1 tablet (20 mg total) by mouth daily    Screening for malignant neoplasm of colon  -     Ambulatory Referral to Gastroenterology; Future    Pre-diabetes    Cigarette nicotine dependence without complication    Mild episode of recurrent major depressive disorder (HCC)    DDD (degenerative disc disease), lumbar          Subjective:      Patient ID: Catrachita Poole is a 54 y.o. female.    HPI    Patient presents with complaints of acute worsening of her chronic low back pain that has been going on for the past 25 years.  She states that she has lumbar spondylosis and was supposed to have sx last month but decided not to and lost her insurance  and is looking into having sx later.  She states that she is currently seeing a surgeon from [facility] and has an mri scheduled for the back.  She states that she has MRI from an outside source that is not in epic.  She reports that she had a worsening of her low back pain that started 2 days ago   She states that what caused it was that she was bending repeatedly and the pain started.  Pain  is described as stabbing and located in the middle of the lumbar region, and grade 8/10  Pain is non radiating   She denies any issues with urinary or fecal incontinence or retention but states that her left leg is weaker in her toes and she states that it is chronic.  She reports that her pain is worse on sitting and driving and worse on extension of the back and states that she cannot stand straight but has to bend when she walks.  She reports that she actually walks hunched   She takes diclofenac and but states that it is for her neck and not her back.  Has been using the heating pad as well as ice.  She states that she hates the way muscle relaxants make her feel and does not take them.  She states that she quit smoking about 6 months ago.    The following portions of the patient's history were reviewed and updated as appropriate: She  has a past medical history of Adjustment disorder, Anxiety, Diabetes mellitus (HCC), History of cocaine use, History of tobacco use, HLD (hyperlipidemia), Lipoma of shoulder, PONV (postoperative nausea and vomiting), Sesamoiditis (3/30/2020), Severe cervical dysplasia (6/16/2017), and Tobacco dependence syndrome.  She   Patient Active Problem List    Diagnosis Date Noted   • Acute bilateral low back pain without sciatica 06/13/2024   • Current severe episode of major depressive disorder without psychotic features, unspecified whether recurrent (HCC) 01/09/2024   • Thyroid nodule 09/11/2023   • DDD (degenerative disc disease), lumbar 06/12/2023   • Mild episode of recurrent major depressive disorder  (HCC) 02/21/2023   • Bilateral carpal tunnel syndrome 01/09/2023   • Cigarette nicotine dependence without complication 10/03/2022   • Other hyperlipidemia 08/19/2022   • Pre-diabetes 10/01/2021   • Anxiety 09/29/2021   • Insomnia 09/29/2021   • Neck pain 04/06/2018     She  has a past surgical history that includes Cervical biopsy (N/A, 6/16/2017); Exploratory laparotomy; Neck surgery; Mammo (historical); Scapular mass excision (Left); Breast surgery; and IR biopsy abdomen (8/24/2023).  Her family history includes Other in her father and mother.  She  reports that she quit smoking about 38 years ago. Her smoking use included cigarettes. She started smoking about 5 months ago. She has been exposed to tobacco smoke. She has never used smokeless tobacco. She reports current alcohol use of about 1.0 standard drink of alcohol per week. She reports that she does not use drugs.  Current Outpatient Medications   Medication Sig Dispense Refill   • diclofenac (VOLTAREN) 75 mg EC tablet Take 75 mg by mouth 2 (two) times a day     • metFORMIN (GLUCOPHAGE) 500 mg tablet Take 1 tablet (500 mg total) by mouth 2 (two) times a day with meals 60 tablet 1   • predniSONE 20 mg tablet Take 1 tablet (20 mg total) by mouth daily 7 tablet 0   • zolpidem (AMBIEN CR) 12.5 MG CR tablet Take 1 tablet (12.5 mg total) by mouth daily at bedtime as needed for sleep 30 tablet 0     No current facility-administered medications for this visit.     Current Outpatient Medications on File Prior to Visit   Medication Sig   • diclofenac (VOLTAREN) 75 mg EC tablet Take 75 mg by mouth 2 (two) times a day   • metFORMIN (GLUCOPHAGE) 500 mg tablet Take 1 tablet (500 mg total) by mouth 2 (two) times a day with meals   • zolpidem (AMBIEN CR) 12.5 MG CR tablet Take 1 tablet (12.5 mg total) by mouth daily at bedtime as needed for sleep     No current facility-administered medications on file prior to visit.     She is allergic to morphine..    Review of Systems  "  Constitutional:  Negative for activity change, chills, fatigue, fever and unexpected weight change.   HENT:  Positive for congestion and rhinorrhea (2/2 allergies). Negative for ear pain, postnasal drip, sinus pressure and sore throat.    Eyes:  Positive for itching (r eye). Negative for pain.   Respiratory:  Negative for cough, choking, chest tightness, shortness of breath and wheezing.    Cardiovascular:  Negative for chest pain, palpitations and leg swelling.   Gastrointestinal:  Negative for abdominal pain, constipation, diarrhea, nausea and vomiting.   Genitourinary:  Negative for dysuria and hematuria.   Musculoskeletal:  Positive for back pain and neck pain. Negative for arthralgias, gait problem, joint swelling, myalgias and neck stiffness.   Skin:  Negative for pallor and rash.   Neurological:  Negative for dizziness, tremors, seizures, syncope, light-headedness and headaches.   Hematological:  Negative for adenopathy.   Psychiatric/Behavioral:  Negative for behavioral problems.          Objective:      /64 (BP Location: Left arm, Patient Position: Sitting, Cuff Size: Standard)   Pulse 83   Temp 99 °F (37.2 °C) (Temporal)   Ht 5' 5\" (1.651 m)   Wt 83.5 kg (184 lb)   SpO2 97%   BMI 30.62 kg/m²          Physical Exam  Constitutional:       General: She is not in acute distress.     Appearance: She is well-developed. She is not diaphoretic.   HENT:      Head: Normocephalic and atraumatic.      Right Ear: External ear normal.      Left Ear: External ear normal.      Nose: Nose normal.      Mouth/Throat:      Mouth: Mucous membranes are dry.      Pharynx: Posterior oropharyngeal erythema present. No oropharyngeal exudate.   Eyes:      General: No scleral icterus.        Right eye: No discharge.         Left eye: No discharge.      Conjunctiva/sclera: Conjunctivae normal.      Pupils: Pupils are equal, round, and reactive to light.   Neck:      Thyroid: No thyromegaly.      Vascular: No JVD.      " Trachea: No tracheal deviation.   Cardiovascular:      Rate and Rhythm: Normal rate and regular rhythm.      Heart sounds: Normal heart sounds. No murmur heard.     No friction rub. No gallop.   Pulmonary:      Effort: Pulmonary effort is normal. No respiratory distress.      Breath sounds: Normal breath sounds. No wheezing or rales.   Chest:      Chest wall: No tenderness.   Abdominal:      General: Bowel sounds are normal. There is no distension.      Palpations: Abdomen is soft. There is no mass.      Tenderness: There is no abdominal tenderness. There is no guarding or rebound.   Musculoskeletal:         General: No deformity. Normal range of motion.      Cervical back: Normal range of motion and neck supple.      Lumbar back: Spasms and tenderness (Tenderness in the lower lumbar region with paraspinal muscle hypertonicity and negative straight leg raise test bilaterally) present. Negative right straight leg raise test and negative left straight leg raise test.   Lymphadenopathy:      Cervical: No cervical adenopathy.   Skin:     General: Skin is warm and dry.      Coloration: Skin is not pale.      Findings: No erythema or rash.   Neurological:      Mental Status: She is alert and oriented to person, place, and time.      Cranial Nerves: No cranial nerve deficit.      Motor: No abnormal muscle tone.      Coordination: Coordination normal.      Deep Tendon Reflexes: Reflexes are normal and symmetric.   Psychiatric:         Behavior: Behavior normal.           Hospital Outpatient Visit on 08/24/2023   Component Date Value Ref Range Status   • Case Report 08/24/2023    Final                    Value:Non-gynecologic Cytology                          Case: [id number]                                  Authorizing Provider:  Wyatt Campbell,  Collected:           08/24/2023 1113                                     MD                                                                           Ordering Location:      [facility]      Received:            08/24/2023 [address] [place] [address] Interventional                                                                             Radiology                                                                    Pathologist:           Jacobo Boston MD                                                    Specimens:   A) - Thyroid, Left, nodule                                                                          B) - Thyroid, Left                                                                        • Final Diagnosis 08/24/2023    Final                    Value:This result contains rich text formatting which cannot be displayed here.   • Note 08/24/2023    Final                    Value:This result contains rich text formatting which cannot be displayed here.   • Intraoperative Consultation 08/24/2023    Final                    Value:This result contains rich text formatting which cannot be displayed here.   • Gross Description 08/24/2023    Final                    Value:This result contains rich text formatting which cannot be displayed here.   • Clinical Information 08/24/2023    Final                    Value:left scapular lipoma   • Additional Information 08/24/2023    Final                    Value:This result contains rich text formatting which cannot be displayed here.        left lower quadrant pain for one month

## 2024-06-18 ENCOUNTER — TELEPHONE (OUTPATIENT)
Dept: BARIATRICS | Facility: CLINIC | Age: 54
End: 2024-06-18

## 2024-06-24 ENCOUNTER — TELEPHONE (OUTPATIENT)
Age: 54
End: 2024-06-24

## 2024-06-24 DIAGNOSIS — G47.00 INSOMNIA, UNSPECIFIED TYPE: ICD-10-CM

## 2024-06-24 RX ORDER — ZOLPIDEM TARTRATE 12.5 MG/1
12.5 TABLET, FILM COATED, EXTENDED RELEASE ORAL
Qty: 30 TABLET | Refills: 0 | Status: SHIPPED | OUTPATIENT
Start: 2024-06-24

## 2024-06-24 NOTE — TELEPHONE ENCOUNTER
Medication: zolpidem (AMBIEN CR) 12.5 MG CR tablet     Dose/Frequency:     Take 1 tablet (12.5 mg total) by mouth daily at bedtime as needed for sleep       Quantity: 30    Pharmacy:  [facility] [place] [place] - [address]     Office:   [x] PCP/Provider - Authorized By: THOMAS Mcmanus  Dispense: 30 tablet  [] Speciality/Provider -     Does the patient have enough for 3 days?   [] Yes   [x] No - Send as HP to POD

## 2024-06-24 NOTE — TELEPHONE ENCOUNTER
"Daily Note     Today's date: 2024  Patient name: Zelda Mayo  : 1983  MRN: [id number]  Referring provider: Mary Silva PA-C  Dx:   Encounter Diagnosis     ICD-10-CM    1. Neck pain on left side  M54.2       2. Cervical radiculopathy  M54.12       3. Left arm numbness  R20.0                      Subjective: Pt reported continued tightness in L UT and shoulder.       Objective: See treatment diary below      Assessment: Tolerated treatment well. Patient demonstrated fatigue post treatment and exhibited good technique with therapeutic exercises. VC's needed for correct technique throughout session. Pt noted abduction/ER movements cause increased pain. Moderate restrictions with GISTM; some relief post. Monitor NV.      Plan: Continue per plan of care.      Precautions: ESL,     POC expires Unit limit Auth Expiration date PT/OT + Visit Limit?   24 BOMN  na         Visit/Unit Tracking  AUTH Status:  Date 24   Auth after 24 visits Used 1 2 3 4 5 6 7    Remaining  23 22 21 20 21 22 23      Pertinent Findings:      POC End Date: 23                                                                                          Test / Measure  9/15/2022 1/18   FOTO (Predicted 58) 47 45   L ROT 35 65   Distal most s/s L hand L Elbow      Manuals 24   C/S Ddx KS 3' KS 3' TC 4' TC 4' KS 3' KS 3' TC 3'   C/S upglides KS 3' KS 3'   KS 3' KS 3'    C/S STM KS 10' KS 10' TC 10' TC 10' KS 10' KS 8' TC 8'   UT and LS stretch    TC 2'      SH PROM      KS 8' TC 8'   GHJ Inf glides          Neuro Re-Ed          C/S RET 2x15 supine 2x15 supine 2x15 supine 2x15 supine 2x15 supine 2x15 supine 2x15 supine   LS S 2x30\" 2x30\" 2x30\" 2x30\" 2x30\" 2x30\" 2x30\"   Upp trap S 2x30\" 2x30\" 2x30\" 2x30\" 2x30\" 2x30\" 2x30\"   Shrugs 2x15 + ROT 2x15 + ROT 2x15 +ROT 2x15 + ROT 2x15 + ROT 8# 2x15 + ROT 8# 2x15 + ROT 8#   C/S RET+ ext     2x10 2x10 2x10   DNF        " NOV 7/12/24         "  Cane [employer]      2x15 windowsill 2x15 windowsill   Supine scap punches       3\"x10                       Ther Ex          HEP Review          UBE 5' retro 5' retro 5' retro 5' retro 5' retro 5' retro 5' retro   TB/dana LPD 2x10 12# 2x10 12# 2x10 12# 2x10 12# 2x10 12# 2x10 12# 12# 2x10   TB/dana rows 2x10 17# 2x10 17# 2x10 17# 2x10 17# 2x10 17# 2x10 17# 17# 2x10   [place] [facility]      3x10 3# 3# 3x10             Ther Activity                              Gait Training                              Modalities                                                         "

## 2024-06-25 ENCOUNTER — TELEPHONE (OUTPATIENT)
Age: 54
End: 2024-06-25

## 2024-06-25 NOTE — TELEPHONE ENCOUNTER
Caller: Catrachita Poole    Doctor: Sarah    Reason for call: Catrachita has a bone spur on her right foot .  She is in a lot of pain.  The xray is being faxed over.  Can we please do a force on?  Thank you.     Call back#: [phone]

## 2024-07-08 ENCOUNTER — CLINICAL SUPPORT (OUTPATIENT)
Dept: BARIATRICS | Facility: CLINIC | Age: 54
End: 2024-07-08

## 2024-07-08 VITALS
DIASTOLIC BLOOD PRESSURE: 60 MMHG | HEIGHT: 65 IN | WEIGHT: 178.2 LBS | RESPIRATION RATE: 16 BRPM | HEART RATE: 90 BPM | BODY MASS INDEX: 29.69 KG/M2 | SYSTOLIC BLOOD PRESSURE: 102 MMHG

## 2024-07-08 DIAGNOSIS — R63.5 ABNORMAL WEIGHT GAIN: Primary | ICD-10-CM

## 2024-07-08 PROCEDURE — RECHECK

## 2024-07-08 RX ORDER — IBUPROFEN 800 MG/1
800 TABLET ORAL AS NEEDED
COMMUNITY

## 2024-07-08 NOTE — PROGRESS NOTES
Patient last visit weight:182lb 6.4oz  Patient current visit weight: 178.2    If you are taking phentermine or other oral weight loss medications, are you experiencing any of the following symptoms:  Headache: NO  Blurred Vision: NO  Chest Pain: NO  Palpitations:NO  Insomnia: NO  SPECIFY ORAL MEDICATION AND DOSAGE:  METFORMIN 500MG BID    If you are taking an injectable medication,  are you experiencing any of the following symptoms:  Bloating:   Nausea:  Vomiting:   Constipation:   Diarrhea:  SPECIFY INJECTABLE MEDICATION AND CURRENT DOSAGE:      Vitals:    Is BP less than 100/60?NO  Is BP greater than 140/90?NO  Is HR greater than 100?NO  **If yes to any of the above, have patient relax and repeat in 5-10 minutes**    Repeat values:    Is BP less than 100/60?  Is BP greater than 140/90?  Is HR greater than 100?  **If values remain outside of ranges above, please consult provider for next steps**

## 2024-07-10 ENCOUNTER — TELEPHONE (OUTPATIENT)
Age: 54
End: 2024-07-10

## 2024-07-10 ENCOUNTER — CONSULT (OUTPATIENT)
Age: 54
End: 2024-07-10
Payer: COMMERCIAL

## 2024-07-10 VITALS
HEIGHT: 66 IN | SYSTOLIC BLOOD PRESSURE: 114 MMHG | HEART RATE: 75 BPM | WEIGHT: 176.2 LBS | OXYGEN SATURATION: 98 % | DIASTOLIC BLOOD PRESSURE: 78 MMHG | BODY MASS INDEX: 28.32 KG/M2 | TEMPERATURE: 98.5 F

## 2024-07-10 DIAGNOSIS — M54.2 NECK PAIN: ICD-10-CM

## 2024-07-10 DIAGNOSIS — Z01.818 PRE-OP EXAMINATION: ICD-10-CM

## 2024-07-10 DIAGNOSIS — M47.816 SPONDYLOSIS OF LUMBAR SPINE: ICD-10-CM

## 2024-07-10 DIAGNOSIS — F51.01 PRIMARY INSOMNIA: ICD-10-CM

## 2024-07-10 DIAGNOSIS — E04.1 THYROID NODULE: ICD-10-CM

## 2024-07-10 DIAGNOSIS — F40.240 CLAUSTROPHOBIA: Primary | ICD-10-CM

## 2024-07-10 DIAGNOSIS — Z12.12 ENCOUNTER FOR COLORECTAL CANCER SCREENING: ICD-10-CM

## 2024-07-10 DIAGNOSIS — R73.03 PRE-DIABETES: ICD-10-CM

## 2024-07-10 DIAGNOSIS — Z12.11 ENCOUNTER FOR COLORECTAL CANCER SCREENING: ICD-10-CM

## 2024-07-10 PROCEDURE — 93000 ELECTROCARDIOGRAM COMPLETE: CPT | Performed by: NURSE PRACTITIONER

## 2024-07-10 PROCEDURE — 99242 OFF/OP CONSLTJ NEW/EST SF 20: CPT | Performed by: NURSE PRACTITIONER

## 2024-07-10 NOTE — PROGRESS NOTES
Presurgical Evaluation    Subjective:      Patient ID: Catrachita Poole is a 54 y.o. female.    Chief Complaint   Patient presents with    Pre-op Exam     Sedation for Mri     Health Screening     Colonoscopy on hold for now mammogram scheduled for July 29th       [facility]    Patient presents today for medical clearance for sedation for MRI.     The following portions of the patient's history were reviewed and updated as appropriate: allergies, current medications, past family history, past medical history, past social history, past surgical history, and problem list.    Procedure date: 7/31/2024    Surgeon:  [facility]- MRI   Planned procedure:  MRI lumbar spine under sedation   Diagnosis for procedure:  claustrophobia     Prior anesthesia: Yes   General; Complications:  None / Tolerated well  MAC; Complications:  None / Tolerated well    CAD History: None    Pulmonary History: None    Renal history: None    Diabetes History:  None  Pre-diabetes on metformin 500mg bid. Hba1c 5.9      Neurological History: None     On Immunosuppressant meds/biologics: No    Review of Systems   Constitutional:  Negative for chills and fever.   Respiratory:  Negative for cough, chest tightness, shortness of breath and wheezing.    Cardiovascular:  Negative for chest pain, palpitations and leg swelling.   Gastrointestinal:  Positive for nausea. Negative for diarrhea and vomiting.   Genitourinary:  Negative for difficulty urinating, dysuria, frequency, hematuria and urgency.   Musculoskeletal:  Positive for back pain.   Skin:  Negative for rash and wound.   Neurological:  Negative for dizziness, seizures, syncope, light-headedness and headaches.         Current Outpatient Medications   Medication Sig Dispense Refill    diclofenac (VOLTAREN) 75 mg EC tablet Take 75 mg by mouth 2 (two) times a day      ibuprofen (MOTRIN) 800 mg tablet Take 800 mg by mouth as needed      metFORMIN (GLUCOPHAGE) 500 mg tablet Take 1 tablet (500 mg  total) by mouth 2 (two) times a day with meals 60 tablet 1    zolpidem (AMBIEN CR) 12.5 MG CR tablet Take 1 tablet (12.5 mg total) by mouth daily at bedtime as needed for sleep 30 tablet 0     No current facility-administered medications for this visit.       Allergies on file:   Morphine    Patient Active Problem List   Diagnosis    Neck pain    Anxiety    Insomnia    Pre-diabetes    Other hyperlipidemia    Cigarette nicotine dependence without complication    Bilateral carpal tunnel syndrome    Mild episode of recurrent major depressive disorder (HCC)    DDD (degenerative disc disease), lumbar    Thyroid nodule    Current severe episode of major depressive disorder without psychotic features, unspecified whether recurrent (HCC)    Acute bilateral low back pain without sciatica    Spondylosis of lumbar spine    Claustrophobia        Past Medical History:   Diagnosis Date    Adjustment disorder     Anxiety     Diabetes mellitus (HCC)     History of cocaine use     History of tobacco use     HLD (hyperlipidemia)     Lipoma of shoulder     PONV (postoperative nausea and vomiting)     Sesamoiditis 3/30/2020    Severe cervical dysplasia 2017    Tobacco dependence syndrome        Past Surgical History:   Procedure Laterality Date    BREAST SURGERY      Augment.     CERVICAL BIOPSY N/A 2017    Procedure: CONE BIOPSY;  Surgeon: Enrrique Harley MD;  Location: [facility];  Service: Gynecology    EXPLORATORY LAPAROTOMY      Age 18, ovarian cysts    IR BIOPSY ABDOMEN  2023    MAMMO (HISTORICAL)      NECK SURGERY      Last Assessed 2016    SCAPULAR MASS EXCISION Left     lipoma       Family History   Problem Relation Age of Onset    Other Mother         No Pertinent Family History    Other Father         No Pertinent Family History       Social History     Tobacco Use    Smoking status: Former     Types: Cigarettes     Start date: 2024     Quit date: 1986     Years since quittin.5     Passive  "exposure: Past    Smokeless tobacco: Never    Tobacco comments:     Smoked on and off   Vaping Use    Vaping status: Never Used   Substance Use Topics    Alcohol use: Yes     Alcohol/week: 1.0 standard drink of alcohol     Types: 1 Glasses of wine per week     Comment: social    Drug use: No     Comment: hx of Cocaine use.       Objective:    Vitals:    07/10/24 0803   BP: 114/78   BP Location: Left arm   Patient Position: Sitting   Cuff Size: Standard   Pulse: 75   Temp: 98.5 °F (36.9 °C)   TempSrc: Temporal   SpO2: 98%   Weight: 79.9 kg (176 lb 3.2 oz)   Height: 5' 5.51\" (1.664 m)        Physical Exam  Vitals reviewed.   Constitutional:       General: She is not in acute distress.     Appearance: Normal appearance. She is not diaphoretic.   HENT:      Head: Normocephalic and atraumatic.      Right Ear: Tympanic membrane and external ear normal.      Left Ear: Tympanic membrane and external ear normal.      Nose: Nose normal.      Mouth/Throat:      Mouth: Mucous membranes are moist.      Pharynx: Oropharynx is clear. No oropharyngeal exudate or posterior oropharyngeal erythema.   Eyes:      Extraocular Movements: Extraocular movements intact.      Conjunctiva/sclera: Conjunctivae normal.      Pupils: Pupils are equal, round, and reactive to light.   Neck:      Vascular: No carotid bruit.   Cardiovascular:      Rate and Rhythm: Normal rate and regular rhythm.      Heart sounds: Normal heart sounds. No murmur heard.  Pulmonary:      Effort: Pulmonary effort is normal. No respiratory distress.      Breath sounds: Normal breath sounds. No wheezing, rhonchi or rales.   Neurological:      Mental Status: She is alert and oriented to person, place, and time. Mental status is at baseline.   Psychiatric:         Mood and Affect: Mood normal.         Behavior: Behavior normal.         Thought Content: Thought content normal.         Judgment: Judgment normal.           Preop labs/testing available and reviewed: yes  EKG : " sinus rhythm. Nonspecific T waves changes in septal leads. No previous EKG for comparison     Echo no    Stress test/cath no    PFT/[place] no    Functional capacity: Walking , 4-5 MPH               4 Mets   Pick the highest level patient can comfortably perform   4 mets or greater for surgery    RCRI  High Risk surgery?         1 Point  CAD History:         1 Point   MI; Positive Stress Test; CP due to Mi;  Nitrate Usage to control Angina; Pathologic Q wave on EKG  CHF Active:         1 Point   Pulm Edema; Paroxysmal Nocturnal Dyspnea;  Bibasilar Rales (crackles);S3; CHF on CXR  Cerebrovascular Disease (TIA or CVA):     1 Point  DM on Insulin:        1 Point  Serum Creat >2.0 mg/dl:       1 Point          Total Points: 0     Scorin: Class I, Very Low Risk (0.4%)     1: Class II, Low risk (0.9%)     2: Class III Moderate (6.6%)     3: Class IV High (>11%)      SHELLEY Risk:  GFR:    65 from 2023     For PCP:  If GFR>60, Hold ACE/ARB/Diuretic on the day of surgery, and NSAIDS 10 days before.    If GFR<45, Consider PRE and POST op Nephrology Consult.    If 46 <GFR> 59 : Has Patient had SHELLEY in last 6 Months? no   If YES: Preop Nephrology consult   If No:  Post Op Nephrology consult.           Assessment/Plan:    Patient is medically optimized (cleared) for the planned procedure.    Further testing/evaluation is not required.    Postop concerns: no    Problem List Items Addressed This Visit       Neck pain     Managed by [facility]          Insomnia     - controlled with ambien CR 12.5mg daily          Pre-diabetes     - Hba1c 5.9  - managed on metformin 500mg bid          Thyroid nodule     -biopsied Aug 2023  -[place] III nodule GCS benign   -TSH normal          Spondylosis of lumbar spine     -followed by [facility]   -scheduled for lumbar MRI   -scheduled for Bilateral L4-5, L5-S1 RFA under MAC  w/ Dr Dewey Sam         Claustrophobia - Primary     - pt unable to tolerate MRI without sedation due to  "claustrophobia   - scheduled for MRI under sedation 7/31          Other Visit Diagnoses       Pre-op examination        medically optimized for MRI under sedation    Relevant Orders    POCT ECG (Completed)    Encounter for colorectal cancer screening                 Diagnoses and all orders for this visit:    Claustrophobia    Spondylosis of lumbar spine    Pre-op examination  Comments:  medically optimized for MRI under sedation  Orders:  -     POCT ECG    Primary insomnia    Pre-diabetes    Thyroid nodule    Neck pain    Encounter for colorectal cancer screening          Pre-Surgery Instructions:   Medication Instructions    diclofenac (VOLTAREN) 75 mg EC tablet No change needed prior to MRI    ibuprofen (MOTRIN) 800 mg tablet NO change needed prior to MRI    metFORMIN (GLUCOPHAGE) 500 mg tablet No change needed prior to MRI    zolpidem (AMBIEN CR) 12.5 MG CR tablet No change needed prior to MRI        NOTE: Please use the above to review important meds for your specialty, the remainder \"per anesthesia Guidelines.\"    NOTE: Please place an Inbasket message for \"SOC\" pool for complicated patients.      "

## 2024-07-10 NOTE — ASSESSMENT & PLAN NOTE
- pt unable to tolerate MRI without sedation due to claustrophobia   - scheduled for MRI under sedation 7/31

## 2024-07-10 NOTE — TELEPHONE ENCOUNTER
Hal from [facility] calling requesting to speak with office regarding clearance paperwork.    Warm transfer to office.

## 2024-07-10 NOTE — ASSESSMENT & PLAN NOTE
-followed by [facility]   -scheduled for lumbar MRI 7/31  -scheduled for Bilateral L4-5, L5-S1 RFA under MAC 8/27 w/ Dr Palomo Ly

## 2024-07-12 ENCOUNTER — TELEPHONE (OUTPATIENT)
Age: 54
End: 2024-07-12

## 2024-07-12 NOTE — TELEPHONE ENCOUNTER
PA for zolpidem (AMBIEN CR) 12.5 MG CR tablet     Submitted via    [x]CMM-KEY [id number]  []SurescriPyreos-Case ID #   []Faxed to plan   []Other website   []Phone call Case ID #     Office notes sent, clinical questions answered. Awaiting determination    Turnaround time for your insurance to make a decision on your Prior Authorization can take 7-21 business days.

## 2024-07-15 NOTE — TELEPHONE ENCOUNTER
Good morning.  The insurance co. told me that a form, questions need to be answered in order for them to cover my zolpidem.  Can someone take care of this please?  Catrachita

## 2024-07-16 NOTE — TELEPHONE ENCOUNTER
PA for zolpidem (AMBIEN CR) 12.5 MG CR tablet  Denied    Reason:(Screenshot if applicable)        Message sent to office clinical pool Yes    Denial letter scanned into Media Yes    Appeal started No ( Provider will need to decide if appeal is warranted and send clinical documentation to Prior Authorization Team for initiation.)    **Please follow up with your patient regarding denial and next steps**

## 2024-07-17 ENCOUNTER — TELEPHONE (OUTPATIENT)
Age: 54
End: 2024-07-17

## 2024-07-17 NOTE — TELEPHONE ENCOUNTER
Please advise patient that she could use good Rx and get this medication from [facility] for $17.46 or [facility] for $14.    Otherwise we would need documentation from patient and see if any of the drugs listed below she has used in the past without success.

## 2024-07-19 NOTE — TELEPHONE ENCOUNTER
"Per provider, \"Please see phone call from 7/10, patient would like to do medication appeal with insurance, I will need to know reasons why she cannot take each medication listed under the formulary which is listed on the denial letter.\"    POD needs above patient details in order to start appeal, if we send a new request it will be denied again, due to not providing what is being asked in order to see if decision will be overturned. All clinical questions were answered in initial request for PA, so there would be no additional paperwork to complete besides an appeal.  "

## 2024-07-22 DIAGNOSIS — G47.00 INSOMNIA, UNSPECIFIED TYPE: ICD-10-CM

## 2024-07-22 RX ORDER — ZOLPIDEM TARTRATE 12.5 MG/1
12.5 TABLET, FILM COATED, EXTENDED RELEASE ORAL
Qty: 30 TABLET | Refills: 0 | Status: SHIPPED | OUTPATIENT
Start: 2024-07-22

## 2024-07-22 NOTE — TELEPHONE ENCOUNTER
Reason for call:   [x] Refill   [] Prior Auth  [] Other:     Office:   [x] PCP/Provider - deepika mott  [] Specialty/Provider -     Medication: zolpidem (AMBIEN CR) 12.5 MG CR tablet     Dose/Frequency: 12.5 mg, Oral, Daily at bedtime PRN     Quantity: 30    Pharmacy: [facility] - [place], [place] - [address]     Does the patient have enough for 3 days?   [] Yes   [x] No - Send as HP to POD

## 2024-08-13 DIAGNOSIS — E66.9 OBESITY, CLASS I, BMI 30-34.9: ICD-10-CM

## 2024-08-20 DIAGNOSIS — G47.00 INSOMNIA, UNSPECIFIED TYPE: ICD-10-CM

## 2024-08-20 RX ORDER — ZOLPIDEM TARTRATE 12.5 MG/1
12.5 TABLET, FILM COATED, EXTENDED RELEASE ORAL
Qty: 30 TABLET | Refills: 0 | Status: SHIPPED | OUTPATIENT
Start: 2024-08-20

## 2024-08-20 NOTE — TELEPHONE ENCOUNTER
Reason for call:   [x] Refill   [] Prior Auth  [] Other:     Office:   [x] PCP/Provider -   [] Specialty/Provider -     Medication:     zolpidem (AMBIEN CR) 12.5 MG CR tablet       Dose/Frequency: : Take 1 tablet (12.5 mg total) by mouth daily at bedtime as needed for sleep,     Quantity:  30 tablet     Pharmacy: [facility] - [place], [place] - [address] [phone]     Does the patient have enough for 3 days?   [] Yes   [x] No - Send as HP to POD

## 2024-09-19 DIAGNOSIS — G47.00 INSOMNIA, UNSPECIFIED TYPE: ICD-10-CM

## 2024-09-19 RX ORDER — ZOLPIDEM TARTRATE 12.5 MG/1
12.5 TABLET, FILM COATED, EXTENDED RELEASE ORAL
Qty: 30 TABLET | Refills: 0 | Status: SHIPPED | OUTPATIENT
Start: 2024-09-19

## 2024-09-19 NOTE — TELEPHONE ENCOUNTER
Reason for call:   [x] Refill   [] Prior Auth  [] Other:     Office:   [x] PCP/Provider - Frank Wilson MD /[facility]  [] Specialty/Provider -     Medication: zolpidem (AMBIEN CR) 12.5 MG CR tablet     Dose/Frequency: Take 1 tablet (12.5 mg total) by mouth daily at bedtime as needed for sleep     Quantity: 30    Pharmacy: [facility] - [place], [place] - [address]       Does the patient have enough for 3 days?   [] Yes   [x] No - Send as HP to POD

## 2024-10-15 ENCOUNTER — TELEMEDICINE (OUTPATIENT)
Age: 54
End: 2024-10-15
Payer: COMMERCIAL

## 2024-10-15 DIAGNOSIS — Z12.12 ENCOUNTER FOR COLORECTAL CANCER SCREENING: Primary | ICD-10-CM

## 2024-10-15 DIAGNOSIS — F51.01 PRIMARY INSOMNIA: ICD-10-CM

## 2024-10-15 DIAGNOSIS — E78.5 HYPERLIPIDEMIA, UNSPECIFIED HYPERLIPIDEMIA TYPE: ICD-10-CM

## 2024-10-15 DIAGNOSIS — R73.03 PRE-DIABETES: ICD-10-CM

## 2024-10-15 DIAGNOSIS — M54.2 NECK PAIN: ICD-10-CM

## 2024-10-15 DIAGNOSIS — E78.49 OTHER HYPERLIPIDEMIA: ICD-10-CM

## 2024-10-15 DIAGNOSIS — R79.89 ABNORMAL TSH: ICD-10-CM

## 2024-10-15 DIAGNOSIS — E03.9 HYPOTHYROIDISM, UNSPECIFIED TYPE: ICD-10-CM

## 2024-10-15 DIAGNOSIS — F17.210 CIGARETTE NICOTINE DEPENDENCE WITHOUT COMPLICATION: ICD-10-CM

## 2024-10-15 DIAGNOSIS — Z12.11 ENCOUNTER FOR COLORECTAL CANCER SCREENING: Primary | ICD-10-CM

## 2024-10-15 DIAGNOSIS — E04.1 THYROID NODULE: ICD-10-CM

## 2024-10-15 DIAGNOSIS — M47.816 SPONDYLOSIS OF LUMBAR SPINE: ICD-10-CM

## 2024-10-15 DIAGNOSIS — R73.01 ELEVATED FASTING GLUCOSE: ICD-10-CM

## 2024-10-15 DIAGNOSIS — Z13.228 SCREENING FOR METABOLIC DISORDER: ICD-10-CM

## 2024-10-15 PROCEDURE — 99214 OFFICE O/P EST MOD 30 MIN: CPT | Performed by: NURSE PRACTITIONER

## 2024-10-15 RX ORDER — PREGABALIN 75 MG/1
75 CAPSULE ORAL 3 TIMES DAILY
COMMUNITY
Start: 2024-10-01 | End: 2024-12-30

## 2024-10-15 NOTE — ASSESSMENT & PLAN NOTE
-continue to follow with surgical oncology   -biopsied Aug 2023  -[place] III nodule GCS benign   -TSH normal

## 2024-10-15 NOTE — PROGRESS NOTES
Virtual Regular Visit  Name: Catrachita Poole      : 1970      MRN: [id number]  Encounter Provider: THOMAS Mcmanus  Encounter Date: 10/15/2024   Encounter department: [facility] [place]    Verification of patient location:    Patient is located at Home in the following state in which I hold an active license [place]    Assessment & Plan  Encounter for colorectal cancer screening    Orders:    Cologuard    Screening for metabolic disorder    Orders:    Comprehensive metabolic panel; Future    CBC and differential    Lipid panel    Comprehensive metabolic panel    Hyperlipidemia, unspecified hyperlipidemia type    Orders:    Lipid panel    Elevated fasting glucose    Orders:    Hemoglobin A1C    Thyroid nodule  -continue to follow with surgical oncology   -biopsied Aug 2023  -[place] III nodule GCS benign   -TSH normal             Abnormal TSH    Orders:    TSH, 3rd generation with Free T4 reflex    Hypothyroidism, unspecified type    Orders:    TSH, 3rd generation with Free T4 reflex    Spondylosis of lumbar spine  -followed by  physiatry   -follows Dr Dewey Sam         Cigarette nicotine dependence without complication  -encouraged smoking cessation            Neck pain  Managed by [facility] physiatry         Primary insomnia  -Patient currently on Ambien 12.5 mg daily at bedtime.           Pre-diabetes  HBA1C 5.9  Continue metformin  Patient is to continue to work on diet and exercise.  Limit sugars and carbohydrate intake.    Avoid soda, juice, sweets, cookies, desserts, pasta, bread.   Eat more whole grains, exercised 30 min of cardio at least 3 times a week.  Also recommended daily foot exams to check for sores, and recommended yearly eye exams.            Other hyperlipidemia  Recommend OTC red yeast rice. Advised to increase fiber in diet. Recommend healthy lifestyle choices for your cholesterol.  Low fat/low cholesterol diet.  Limit/avoid red meat.  Eat  more lean meat - chicken breast, ground turkey, fish.  Exercise 30 mins at least 5 times a week as tolerated.                  Encounter provider THOMAS Mcmanus    The patient was identified by name and date of birth. Catrachita Poole was informed that this is a telemedicine visit and that the visit is being conducted through the Epic Embedded platform. She agrees to proceed..  My office door was closed. No one else was in the room.  She acknowledged consent and understanding of privacy and security of the video platform. The patient has agreed to participate and understands they can discontinue the visit at any time.    Patient is aware this is a billable service.     History of Present Illness     Patient presents today to follow-up on chronic conditions.    Anxiety- currently controlled off medication       Primary insomnia-controlled with Ambien, have tried different sleep aids in the past with no relief, has been waking up a night to use the bathroom and then she can not fall back to sleep     Hyperlipidemia-Cholesterol 248, , currently not on medication    Pre-diabetes- HBA1C 5.9, had seen weight management and was started on metformin, she has lost weight since starting this medication    Chronic lumbar back pain and cervical pain, currently following pain management and physiatry                  History obtained from : patient  Review of Systems   Constitutional:  Negative for activity change, appetite change, chills, diaphoresis and fever.   HENT:  Negative for congestion, ear discharge, ear pain, postnasal drip, rhinorrhea, sinus pressure, sinus pain and sore throat.    Eyes:  Negative for pain, discharge, itching and visual disturbance.   Respiratory:  Negative for cough, chest tightness, shortness of breath and wheezing.    Cardiovascular:  Negative for chest pain, palpitations and leg swelling.   Gastrointestinal:  Negative for abdominal pain, constipation, diarrhea, nausea and  vomiting.   Endocrine: Negative for polydipsia, polyphagia and polyuria.   Genitourinary:  Negative for difficulty urinating, dysuria and urgency.   Musculoskeletal:  Positive for back pain and neck pain. Negative for arthralgias.   Skin:  Negative for rash and wound.   Neurological:  Negative for dizziness, weakness, numbness and headaches.           Objective     There were no vitals taken for this visit.  Physical Exam  Constitutional:       Appearance: Normal appearance. She is not ill-appearing.   Eyes:      General: No scleral icterus.  Pulmonary:      Effort: No respiratory distress.   Neurological:      Mental Status: She is alert.   Psychiatric:         Mood and Affect: Mood normal.         Behavior: Behavior normal.         Visit Time  Total Visit Duration: 20

## 2024-10-15 NOTE — ASSESSMENT & PLAN NOTE
HBA1C 5.9  Continue metformin  Patient is to continue to work on diet and exercise.  Limit sugars and carbohydrate intake.    Avoid soda, juice, sweets, cookies, desserts, pasta, bread.   Eat more whole grains, exercised 30 min of cardio at least 3 times a week.  Also recommended daily foot exams to check for sores, and recommended yearly eye exams.

## 2024-10-21 DIAGNOSIS — G47.00 INSOMNIA, UNSPECIFIED TYPE: ICD-10-CM

## 2024-10-21 RX ORDER — ZOLPIDEM TARTRATE 12.5 MG/1
12.5 TABLET, FILM COATED, EXTENDED RELEASE ORAL
Qty: 30 TABLET | Refills: 0 | Status: SHIPPED | OUTPATIENT
Start: 2024-10-21

## 2024-10-21 NOTE — TELEPHONE ENCOUNTER
Reason for call:   [x] Refill   [] Prior Auth  [] Other:     Office: [facility]   [x] PCP/Provider - Catrachita Leblacn   [] Specialty/Provider -     Medication: zolpidem     Dose/Frequency: 12.5 mg CR/ daily PRN     Quantity: 30 day supply     Pharmacy: [facility]     Does the patient have enough for 3 days?   [] Yes   [x] No - Send as HP to POD

## 2024-10-22 ENCOUNTER — TELEPHONE (OUTPATIENT)
Age: 54
End: 2024-10-22

## 2024-10-22 NOTE — TELEPHONE ENCOUNTER
Patient coming in on 11/1  for a pre op appt for a sedated MRI 11/14 with [facility].    She states there is a form needed? She does not know where to get this.     Is it something that we have?

## 2024-10-29 ENCOUNTER — HOSPITAL ENCOUNTER (EMERGENCY)
Facility: HOSPITAL | Age: 54
Discharge: HOME | End: 2024-10-29
Attending: EMERGENCY MEDICINE
Payer: COMMERCIAL

## 2024-10-29 ENCOUNTER — APPOINTMENT (EMERGENCY)
Dept: RADIOLOGY | Facility: HOSPITAL | Age: 54
End: 2024-10-29
Payer: COMMERCIAL

## 2024-10-29 VITALS
SYSTOLIC BLOOD PRESSURE: 156 MMHG | DIASTOLIC BLOOD PRESSURE: 92 MMHG | TEMPERATURE: 97.7 F | OXYGEN SATURATION: 100 % | HEART RATE: 90 BPM | RESPIRATION RATE: 18 BRPM

## 2024-10-29 DIAGNOSIS — M54.2 NECK PAIN: Primary | ICD-10-CM

## 2024-10-29 LAB
ANION GAP SERPL CALCULATED.3IONS-SCNC: 8 MMOL/L (ref 4–13)
ATRIAL RATE: 74 BPM
BASOPHILS # BLD AUTO: 0.02 THOUSANDS/ÂΜL (ref 0–0.1)
BASOPHILS NFR BLD AUTO: 0 % (ref 0–1)
BUN SERPL-MCNC: 16 MG/DL (ref 5–25)
CALCIUM SERPL-MCNC: 9.4 MG/DL (ref 8.4–10.2)
CHLORIDE SERPL-SCNC: 103 MMOL/L (ref 96–108)
CO2 SERPL-SCNC: 27 MMOL/L (ref 21–32)
CREAT SERPL-MCNC: 0.89 MG/DL (ref 0.6–1.3)
EOSINOPHIL # BLD AUTO: 0.06 THOUSAND/ÂΜL (ref 0–0.61)
EOSINOPHIL NFR BLD AUTO: 1 % (ref 0–6)
ERYTHROCYTE [DISTWIDTH] IN BLOOD BY AUTOMATED COUNT: 13.1 % (ref 11.6–15.1)
GFR SERPL CREATININE-BSD FRML MDRD: 74 ML/MIN/1.73SQ M
GLUCOSE SERPL-MCNC: 110 MG/DL (ref 65–140)
HCT VFR BLD AUTO: 40.1 % (ref 34.8–46.1)
HGB BLD-MCNC: 13.1 G/DL (ref 11.5–15.4)
IMM GRANULOCYTES # BLD AUTO: 0.02 THOUSAND/UL (ref 0–0.2)
IMM GRANULOCYTES NFR BLD AUTO: 0 % (ref 0–2)
LYMPHOCYTES # BLD AUTO: 0.87 THOUSANDS/ÂΜL (ref 0.6–4.47)
LYMPHOCYTES NFR BLD AUTO: 15 % (ref 14–44)
MCH RBC QN AUTO: 31 PG (ref 26.8–34.3)
MCHC RBC AUTO-ENTMCNC: 32.7 G/DL (ref 31.4–37.4)
MCV RBC AUTO: 95 FL (ref 82–98)
MONOCYTES # BLD AUTO: 0.28 THOUSAND/ÂΜL (ref 0.17–1.22)
MONOCYTES NFR BLD AUTO: 5 % (ref 4–12)
NEUTROPHILS # BLD AUTO: 4.62 THOUSANDS/ÂΜL (ref 1.85–7.62)
NEUTS SEG NFR BLD AUTO: 79 % (ref 43–75)
NRBC BLD AUTO-RTO: 0 /100 WBCS
P AXIS: 58 DEGREES
PLATELET # BLD AUTO: 285 THOUSANDS/UL (ref 149–390)
PMV BLD AUTO: 8.6 FL (ref 8.9–12.7)
POTASSIUM SERPL-SCNC: 4.2 MMOL/L (ref 3.5–5.3)
PR INTERVAL: 150 MS
QRS AXIS: 84 DEGREES
QRSD INTERVAL: 88 MS
QT INTERVAL: 386 MS
QTC INTERVAL: 428 MS
RBC # BLD AUTO: 4.23 MILLION/UL (ref 3.81–5.12)
SODIUM SERPL-SCNC: 138 MMOL/L (ref 135–147)
T WAVE AXIS: 52 DEGREES
VENTRICULAR RATE: 74 BPM
WBC # BLD AUTO: 5.87 THOUSAND/UL (ref 4.31–10.16)

## 2024-10-29 PROCEDURE — 85025 COMPLETE CBC W/AUTO DIFF WBC: CPT

## 2024-10-29 PROCEDURE — 36415 COLL VENOUS BLD VENIPUNCTURE: CPT

## 2024-10-29 PROCEDURE — 99284 EMERGENCY DEPT VISIT MOD MDM: CPT

## 2024-10-29 PROCEDURE — 80048 BASIC METABOLIC PNL TOTAL CA: CPT

## 2024-10-29 PROCEDURE — 96374 THER/PROPH/DIAG INJ IV PUSH: CPT

## 2024-10-29 PROCEDURE — 70498 CT ANGIOGRAPHY NECK: CPT

## 2024-10-29 PROCEDURE — 96375 TX/PRO/DX INJ NEW DRUG ADDON: CPT

## 2024-10-29 PROCEDURE — 93010 ELECTROCARDIOGRAM REPORT: CPT | Performed by: INTERNAL MEDICINE

## 2024-10-29 PROCEDURE — 99285 EMERGENCY DEPT VISIT HI MDM: CPT | Performed by: EMERGENCY MEDICINE

## 2024-10-29 PROCEDURE — 93005 ELECTROCARDIOGRAM TRACING: CPT

## 2024-10-29 PROCEDURE — 96361 HYDRATE IV INFUSION ADD-ON: CPT

## 2024-10-29 PROCEDURE — 70496 CT ANGIOGRAPHY HEAD: CPT

## 2024-10-29 RX ORDER — DEXAMETHASONE SODIUM PHOSPHATE 10 MG/ML
10 INJECTION, SOLUTION INTRAMUSCULAR; INTRAVENOUS ONCE
Status: COMPLETED | OUTPATIENT
Start: 2024-10-29 | End: 2024-10-29

## 2024-10-29 RX ORDER — METHOCARBAMOL 500 MG/1
500 TABLET, FILM COATED ORAL ONCE
Status: COMPLETED | OUTPATIENT
Start: 2024-10-29 | End: 2024-10-29

## 2024-10-29 RX ORDER — LIDOCAINE 50 MG/G
1 PATCH TOPICAL ONCE
Status: DISCONTINUED | OUTPATIENT
Start: 2024-10-29 | End: 2024-10-29 | Stop reason: HOSPADM

## 2024-10-29 RX ORDER — ACETAMINOPHEN 325 MG/1
650 TABLET ORAL ONCE
Status: COMPLETED | OUTPATIENT
Start: 2024-10-29 | End: 2024-10-29

## 2024-10-29 RX ORDER — DIPHENHYDRAMINE HYDROCHLORIDE 50 MG/ML
25 INJECTION INTRAMUSCULAR; INTRAVENOUS ONCE
Status: COMPLETED | OUTPATIENT
Start: 2024-10-29 | End: 2024-10-29

## 2024-10-29 RX ORDER — DIAZEPAM 10 MG/2ML
5 INJECTION, SOLUTION INTRAMUSCULAR; INTRAVENOUS ONCE
Status: COMPLETED | OUTPATIENT
Start: 2024-10-29 | End: 2024-10-29

## 2024-10-29 RX ADMIN — SODIUM CHLORIDE 1000 ML: 0.9 INJECTION, SOLUTION INTRAVENOUS at 09:07

## 2024-10-29 RX ADMIN — LIDOCAINE 1 PATCH: 50 PATCH CUTANEOUS at 09:02

## 2024-10-29 RX ADMIN — METHOCARBAMOL 500 MG: 500 TABLET ORAL at 09:02

## 2024-10-29 RX ADMIN — ACETAMINOPHEN 650 MG: 325 TABLET, FILM COATED ORAL at 09:02

## 2024-10-29 RX ADMIN — IOHEXOL 85 ML: 350 INJECTION, SOLUTION INTRAVENOUS at 10:59

## 2024-10-29 RX ADMIN — DIPHENHYDRAMINE HYDROCHLORIDE 25 MG: 50 INJECTION, SOLUTION INTRAMUSCULAR; INTRAVENOUS at 09:19

## 2024-10-29 RX ADMIN — DIAZEPAM 5 MG: 10 INJECTION, SOLUTION INTRAMUSCULAR; INTRAVENOUS at 10:21

## 2024-10-29 RX ADMIN — DEXAMETHASONE SODIUM PHOSPHATE 10 MG: 10 INJECTION, SOLUTION INTRAMUSCULAR; INTRAVENOUS at 09:19

## 2024-10-29 NOTE — DISCHARGE INSTRUCTIONS
Follow-up with primary care provider and pain management doctor over concern of cervical radiculopathy.  Use Tylenol and Motrin for headache control.  Return to the emergency department for symptoms do not improve in 1 week or symptoms worsen.

## 2024-10-29 NOTE — ED PROVIDER NOTES
Time reflects when diagnosis was documented in both MDM as applicable and the Disposition within this note       Time User Action Codes Description Comment    10/29/2024  3:15 PM Brice Leblanc Add [M54.2] Neck pain           ED Disposition       ED Disposition   Discharge    Condition   Stable    Date/Time   Tue Oct 29, 2024 11:21 AM    Comment   Catrachita Poole discharge to home/self care.                   Assessment & Plan       Medical Decision Making  Sensation most concerning for headache however will consider cervical injury such as vascular injury, fracture, dislocation, disc herniation.  Will evaluate with CTA head and neck and headache cocktail.  Symptoms are improved following medication hydration however patient states headache feels similar to lumbar puncture headache.  Stable for discharge home will follow-up with pain physician.  Patient feels comfortable discharge plan.    Amount and/or Complexity of Data Reviewed  Labs: ordered.  Radiology: ordered. Decision-making details documented in ED Course.    Risk  OTC drugs.  Prescription drug management.        ED Course as of 10/29/24 1515   Tue Oct 29, 2024   0821 Blood Pressure: 156/92   0821 Temperature: 97.7 °F (36.5 °C)   0821 Temp Source: Tympanic   0821 Pulse: 90   0821 Respirations: 18   0821 SpO2: 100 %   1116 CTA head and neck with and without contrast  IMPRESSION:     CT Brain:  No acute intracranial abnormality.     CT Angiography:  Unremarkable CTA neck and brain.     Left thyroid nodule as described above, this has been previously biopsied.           Workstation performed: [id number]         Medications   acetaminophen (TYLENOL) tablet 650 mg (650 mg Oral Given 10/29/24 0902)   methocarbamol (ROBAXIN) tablet 500 mg (500 mg Oral Given 10/29/24 0902)   sodium chloride 0.9 % bolus 1,000 mL (0 mL Intravenous Stopped 10/29/24 1129)   diphenhydrAMINE (BENADRYL) injection 25 mg (25 mg Intravenous Given 10/29/24 0919)   dexamethasone (PF)  (DECADRON) injection 10 mg (10 mg Intravenous Given 10/29/24 0919)   diazepam (VALIUM) injection 5 mg (5 mg Intravenous Given 10/29/24 1021)   iohexol (OMNIPAQUE) 350 MG/ML injection (MULTI-DOSE) 85 mL (85 mL Intravenous Given 10/29/24 1059)       ED Risk Strat Scores                           SBIRT 20yo+      Flowsheet Row Most Recent Value   Initial Alcohol Screen: US AUDIT-C     1. How often do you have a drink containing alcohol? 0 Filed at: 10/29/2024 0814   2. How many drinks containing alcohol do you have on a typical day you are drinking?  0 Filed at: 10/29/2024 0814   3a. Male UNDER 65: How often do you have five or more drinks on one occasion? 0 Filed at: 10/29/2024 0814   3b. FEMALE Any Age, or MALE 65+: How often do you have 4 or more drinks on one occassion? 0 Filed at: 10/29/2024 0814   Audit-C Score 0 Filed at: 10/29/2024 0814   NICOLA: How many times in the past year have you...    Used an illegal drug or used a prescription medication for non-medical reasons? Never Filed at: 10/29/2024 0814                            History of Present Illness       Chief Complaint   Patient presents with    Medical Problem     Pt c/o chronic neck pain +n/v, left hand numbness, palpitations, severe headache. Pt states all this has been ongoing for months.        Past Medical History:   Diagnosis Date    Adjustment disorder     Anxiety     Diabetes mellitus (HCC)     History of cocaine use     History of tobacco use     HLD (hyperlipidemia)     Lipoma of shoulder     PONV (postoperative nausea and vomiting)     Sesamoiditis 3/30/2020    Severe cervical dysplasia 6/16/2017    Tobacco dependence syndrome       Past Surgical History:   Procedure Laterality Date    BREAST SURGERY      Augment.     CERVICAL BIOPSY N/A 6/16/2017    Procedure: CONE BIOPSY;  Surgeon: Enrrique Harley MD;  Location[facility];  Service: Gynecology    EXPLORATORY LAPAROTOMY      Age 18, ovarian cysts    IR BIOPSY ABDOMEN  8/24/2023    MAMMO  (HISTORICAL)      NECK SURGERY      Last Assessed 2016    SCAPULAR MASS EXCISION Left     lipoma      Family History   Problem Relation Age of Onset    Other Mother         No Pertinent Family History    Other Father         No Pertinent Family History      Social History     Tobacco Use    Smoking status: Former     Types: Cigarettes     Start date: 2024     Quit date: 1986     Years since quittin.8     Passive exposure: Past    Smokeless tobacco: Never    Tobacco comments:     Smoked on and off   Vaping Use    Vaping status: Never Used   Substance Use Topics    Alcohol use: Yes     Alcohol/week: 1.0 standard drink of alcohol     Types: 1 Glasses of wine per week     Comment: social    Drug use: No     Comment: hx of Cocaine use.      E-Cigarette/Vaping    E-Cigarette Use Never User       E-Cigarette/Vaping Substances    Nicotine No     THC No     CBD No     Flavoring No     Other No     Unknown No       I have reviewed and agree with the history as documented.     54-year-old female with past medical history of cervical surgery, herniated disc, left cervical neuropathy presents to the emergency department complaining of headache and left hand numbness.  She mentions yesterday she experienced left arm numbness and this was unusual for her.  She has been dealing with left hand numbness for a few years now.  She follows up with a pain management doctor who performs injections for pain and discomfort.  No recent CT cervical spine or CT head.  She mentions having a fall 3 weeks ago however denies head strike with subsequent.  No blood thinner use per patient.        Review of Systems   Neurological:  Positive for numbness (Left hand) and headaches.   All other systems reviewed and are negative.          Objective       ED Triage Vitals [10/29/24 0813]   Temperature Pulse Blood Pressure Respirations SpO2 Patient Position - Orthostatic VS   97.7 °F (36.5 °C) 90 156/92 18 100 % --      Temp Source Heart  Rate Source BP Location FiO2 (%) Pain Score    Tympanic Monitor -- -- --      Vitals      Date and Time Temp Pulse SpO2 Resp BP Pain Score FACES Pain Rating User   10/29/24 0813 97.7 °F (36.5 °C) 90 100 % 18 156/92 -- -- JR            Physical Exam  Vitals and nursing note reviewed.   Constitutional:       General: She is not in acute distress.     Appearance: She is well-developed.   HENT:      Head: Normocephalic and atraumatic.   Eyes:      Conjunctiva/sclera: Conjunctivae normal.   Cardiovascular:      Rate and Rhythm: Normal rate and regular rhythm.      Heart sounds: No murmur heard.  Pulmonary:      Effort: Pulmonary effort is normal. No respiratory distress.      Breath sounds: Normal breath sounds.   Abdominal:      Palpations: Abdomen is soft.      Tenderness: There is no abdominal tenderness.   Musculoskeletal:         General: No swelling.      Cervical back: Neck supple.   Skin:     General: Skin is warm and dry.      Capillary Refill: Capillary refill takes less than 2 seconds.   Neurological:      General: No focal deficit present.      Mental Status: She is alert and oriented to person, place, and time. Mental status is at baseline.      GCS: GCS eye subscore is 4. GCS verbal subscore is 5. GCS motor subscore is 6.      Cranial Nerves: No cranial nerve deficit.      Sensory: No sensory deficit.      Motor: No weakness.      Coordination: Coordination normal.      Gait: Gait normal.      Comments: Muscle strength 5/5 bilateral upper and lower extremities.  Able to ambulate without difficulty.  Finger-nose testing intact.  Sensation intact to light touch.  Bilateral upper and lower extremities.  She does complain of some discomfort in the left hand.   Psychiatric:         Mood and Affect: Mood normal.         Results Reviewed       Procedure Component Value Units Date/Time    Basic metabolic panel [[id number]] Collected: 10/29/24 0905    Lab Status: Final result Specimen: Blood from Arm, Right Updated:  10/29/24 0936     Sodium 138 mmol/L      Potassium 4.2 mmol/L      Chloride 103 mmol/L      CO2 27 mmol/L      ANION GAP 8 mmol/L      BUN 16 mg/dL      Creatinine 0.89 mg/dL      Glucose 110 mg/dL      Calcium 9.4 mg/dL      eGFR 74 ml/min/1.73sq m     Narrative:      National Kidney Disease Foundation guidelines for Chronic Kidney Disease (CKD):     Stage 1 with normal or high GFR (GFR > 90 mL/min/1.73 square meters)    Stage 2 Mild CKD (GFR = 60-89 mL/min/1.73 square meters)    Stage 3A Moderate CKD (GFR = 45-59 mL/min/1.73 square meters)    Stage 3B Moderate CKD (GFR = 30-44 mL/min/1.73 square meters)    Stage 4 Severe CKD (GFR = 15-29 mL/min/1.73 square meters)    Stage 5 End Stage CKD (GFR <15 mL/min/1.73 square meters)  Note: GFR calculation is accurate only with a steady state creatinine    CBC and differential [[id number]]  (Abnormal) Collected: 10/29/24 0905    Lab Status: Final result Specimen: Blood from Arm, Right Updated: 10/29/24 0919     WBC 5.87 Thousand/uL      RBC 4.23 Million/uL      Hemoglobin 13.1 g/dL      Hematocrit 40.1 %      MCV 95 fL      MCH 31.0 pg      MCHC 32.7 g/dL      RDW 13.1 %      MPV 8.6 fL      Platelets 285 Thousands/uL      nRBC 0 /100 WBCs      Segmented % 79 %      Immature Grans % 0 %      Lymphocytes % 15 %      Monocytes % 5 %      Eosinophils Relative 1 %      Basophils Relative 0 %      Absolute Neutrophils 4.62 Thousands/µL      Absolute Immature Grans 0.02 Thousand/uL      Absolute Lymphocytes 0.87 Thousands/µL      Absolute Monocytes 0.28 Thousand/µL      Eosinophils Absolute 0.06 Thousand/µL      Basophils Absolute 0.02 Thousands/µL             CTA head and neck with and without contrast   Final Interpretation by Rony Rodgers MD (10/29 1110)      CT Brain:  No acute intracranial abnormality.      CT Angiography:  Unremarkable CTA neck and brain.      Left thyroid nodule as described above, this has been previously biopsied.            Workstation  performed: [id number]             Procedures    ED Medication and Procedure Management   Prior to Admission Medications   Prescriptions Last Dose Informant Patient Reported? Taking?   diclofenac (VOLTAREN) 75 mg EC tablet  Self Yes No   Sig: Take 75 mg by mouth 2 (two) times a day   ibuprofen (MOTRIN) 800 mg tablet  Self Yes No   Sig: Take 800 mg by mouth as needed   Patient not taking: Reported on 10/15/2024   metFORMIN (GLUCOPHAGE) 500 mg tablet  Self No No   Sig: Take 1 tablet (500 mg total) by mouth 2 (two) times a day with meals   pregabalin (LYRICA) 75 mg capsule  Self Yes No   Sig: Take 75 mg by mouth Three times a day   zolpidem (AMBIEN CR) 12.5 MG CR tablet   No No   Sig: Take 1 tablet (12.5 mg total) by mouth daily at bedtime as needed for sleep      Facility-Administered Medications: None     Discharge Medication List as of 10/29/2024 11:25 AM        CONTINUE these medications which have NOT CHANGED    Details   diclofenac (VOLTAREN) 75 mg EC tablet Take 75 mg by mouth 2 (two) times a day, Starting Wed 3/6/2024, Historical Med      ibuprofen (MOTRIN) 800 mg tablet Take 800 mg by mouth as needed, Historical Med      metFORMIN (GLUCOPHAGE) 500 mg tablet Take 1 tablet (500 mg total) by mouth 2 (two) times a day with meals, Starting Tue 8/13/2024, Normal      pregabalin (LYRICA) 75 mg capsule Take 75 mg by mouth Three times a day, Starting Tue 10/1/2024, Until Mon 12/30/2024, Historical Med      zolpidem (AMBIEN CR) 12.5 MG CR tablet Take 1 tablet (12.5 mg total) by mouth daily at bedtime as needed for sleep, Starting Mon 10/21/2024, Normal           No discharge procedures on file.  ED SEPSIS DOCUMENTATION   Time reflects when diagnosis was documented in both MDM as applicable and the Disposition within this note       Time User Action Codes Description Comment    10/29/2024  3:15 PM Brice Leblanc Add [M54.2] Neck pain                  Brice Leblanc, DO  10/29/24 1137       Brice Leblanc, DO  10/29/24  3088

## 2024-10-29 NOTE — ED ATTENDING ATTESTATION
10/29/2024  I, Mat Baez MD, saw and evaluated the patient. I have discussed the patient with the resident/non-physician practitioner and agree with the resident's/non-physician practitioner's findings, Plan of Care, and MDM as documented in the resident's/non-physician practitioner's note, except where noted. All available labs and Radiology studies were reviewed.  I was present for key portions of any procedure(s) performed by the resident/non-physician practitioner and I was immediately available to provide assistance.       At this point I agree with the current assessment done in the Emergency Department.  I have conducted an independent evaluation of this patient a history and physical is as follows:    ED Course         Critical Care Time  Procedures

## 2024-11-01 ENCOUNTER — CONSULT (OUTPATIENT)
Age: 54
End: 2024-11-01
Payer: COMMERCIAL

## 2024-11-01 VITALS
TEMPERATURE: 98.8 F | HEART RATE: 103 BPM | DIASTOLIC BLOOD PRESSURE: 80 MMHG | OXYGEN SATURATION: 98 % | HEIGHT: 66 IN | WEIGHT: 170 LBS | SYSTOLIC BLOOD PRESSURE: 110 MMHG | BODY MASS INDEX: 27.32 KG/M2

## 2024-11-01 DIAGNOSIS — M54.2 NECK PAIN: Primary | ICD-10-CM

## 2024-11-01 DIAGNOSIS — Z23 ENCOUNTER FOR IMMUNIZATION: ICD-10-CM

## 2024-11-01 DIAGNOSIS — F51.01 PRIMARY INSOMNIA: ICD-10-CM

## 2024-11-01 DIAGNOSIS — R00.2 PALPITATIONS: ICD-10-CM

## 2024-11-01 DIAGNOSIS — Z01.818 PREOP EXAMINATION: ICD-10-CM

## 2024-11-01 DIAGNOSIS — R73.03 PRE-DIABETES: ICD-10-CM

## 2024-11-01 DIAGNOSIS — F40.240 CLAUSTROPHOBIA: ICD-10-CM

## 2024-11-01 DIAGNOSIS — E04.1 THYROID NODULE: ICD-10-CM

## 2024-11-01 PROCEDURE — 90471 IMMUNIZATION ADMIN: CPT

## 2024-11-01 PROCEDURE — 99214 OFFICE O/P EST MOD 30 MIN: CPT

## 2024-11-01 PROCEDURE — 90673 RIV3 VACCINE NO PRESERV IM: CPT

## 2024-11-01 NOTE — ASSESSMENT & PLAN NOTE
-continue to follow with surgical oncology   -biopsied Aug 2023  -[place] III nodule GCS benign   -TSH normal

## 2024-11-01 NOTE — PROGRESS NOTES
"Pre-operative Clearance  Name: Catrachita Poole      : 1970      MRN: [id number]  Encounter Provider: Genevieve Woodward PA-C  Encounter Date: 2024   Encounter department: [facility] [place]    Assessment & Plan  Preop examination  Patient scheduled for procedure as below  Labs and EKG reviewed  Patient does endorse palpitations.  See plan under palpitations  Discussed with Dr. Campbell who advises patient may proceed with MRI under sedation.  If she were to experience chest pain, shortness of breath, orthopnea, lightheadedness, dizziness, she should be evaluated prior to MRI       Claustrophobia  - pt unable to tolerate MRI without sedation due to claustrophobia   - scheduled for MRI under sedation 24         Neck pain  [facility]  Scheduled for procedure as below         Primary insomnia  Currently on Ambien 12.5 mg daily at bedtime         Palpitations  Patient feeling as though occasional \"heart skips a beat\".  She denies chest pain, shortness of breath, dizziness, lightheadedness associated with the symptoms  Notes the symptoms spontaneously resolved  Patient unsure how frequently the symptoms do occur  She has labs in the chart which I recommend she complete  EKG reviewed from 10/29/24  Holter monitor ordered   Orders:  •  Holter monitor; Future    Pre-diabetes  A1c 5.9  Continues on metformin 500 mg twice daily         Thyroid nodule  -continue to follow with surgical oncology   -biopsied Aug 2023  -[place] III nodule GCS benign   -TSH normal             Encounter for immunization    Orders:  •  influenza vaccine, recombinant, PF, 0.5 mL IM (Flublok)      Pre-operative Clearance:     Revised Cardiac Risk Index:  RCI RISK CLASS I (0 risk factors, risk of major cardiac complications approximately 0.5%)    Medication Instructions:   - Antiepileptic meds: Continue to take this medication on your normal schedule.  - Sleep meds (zolpidem, " "zaleplon, eszopiclone): Continue taking medication up to the evening before procedure/surgery, but do not take this medication on the morning of procedure/surgery.       History of Present Illness     Patient is a 54-year-old female presenting to the office for medical clearance for sedation for MRI  She is receiving sedation for diagnosis of claustrophobia    He denies history of CAD, pulmonary, renal, diabetes, neurological disorders.  She does have a thyroid nodule, but most recent TSH normal  BMP and CMP from 10/29/2024 unremarkable      Of note, patient was seen in [place] ED on 10/29/2024 for headache and left hand numbness  CT head unremarkable for acute intracranial abnormality  Left thyroid nodule noted, this has been previously biopsied  She notes symptoms have resolved    Patient does report occasional feeling as though her heart \"skips a beat\"  She is unable to determine how frequently the symptoms occur  Endorses spontaneous resolution of symptoms  Denies chest pain, shortness of breath, lightheadedness, dizziness    Pre-op Exam  Surgery: MRI neck under anesthesia  Anticipated Date of Surgery: 11/14/24  Surgeon: Radiology Dept- [facility]    Previous history of bleeding disorders or clots?: No  Previous Anesthesia reaction?: No  Prolonged steroid use in the last 6 months?: No    Assessment of Cardiac Risk:   - Unstable or severe angina or MI in the last 6 weeks or history of stent placement in the last year?: No   - Decompensated heart failure (e.g. New onset heart failure, NYHA  Class IV heart failure, or worsening existing heart failure)?: No  - Significant arrhythmias such as high grade AV block, symptomatic ventricular arrhythmia, newly recognized ventricular tachycardia, supraventricular tachycardia with resting heart rate >100, or symptomatic bradycardia?: No  - Severe heart valve disease including aortic stenosis or symptomatic mitral stenosis?: No      Pre-operative Risk Factors:  Elevated-risk " surgery: No    History of cerebrovascular disease: No    History of ischemic heart disease: No  Pre-operative treatment with insulin: No  Pre-operative creatinine >2 mg/dL: No    History of congestive heart failure: No    Duke Activity Status Index (DASI):   DASI Total Score: 28.7  METs: 6.3    Medications of Perioperative Concern:   NSAIDs and Metformin      Review of Systems   Constitutional:  Negative for chills, fatigue and fever.   HENT:  Negative for congestion, ear pain, postnasal drip, rhinorrhea, sinus pressure, sore throat and trouble swallowing.    Eyes:  Negative for pain and visual disturbance.   Respiratory:  Negative for cough, shortness of breath and wheezing.    Cardiovascular:  Negative for chest pain, palpitations and leg swelling.   Gastrointestinal:  Negative for abdominal pain, constipation, diarrhea, nausea and vomiting.   Genitourinary:  Negative for difficulty urinating, dysuria, frequency, hematuria and urgency.   Musculoskeletal:  Positive for back pain and neck pain.   Neurological:  Negative for dizziness, weakness, light-headedness, numbness and headaches.   All other systems reviewed and are negative.    Past Medical History   Past Medical History:   Diagnosis Date   • Adjustment disorder    • Anxiety    • Diabetes mellitus (HCC)    • History of cocaine use    • History of tobacco use    • HLD (hyperlipidemia)    • Lipoma of shoulder    • PONV (postoperative nausea and vomiting)    • Sesamoiditis 3/30/2020   • Severe cervical dysplasia 6/16/2017   • Tobacco dependence syndrome      Past Surgical History:   Procedure Laterality Date   • BREAST SURGERY      Augment.    • CERVICAL BIOPSY N/A 6/16/2017    Procedure: CONE BIOPSY;  Surgeon: Enrrique Harley MD;  Location: [facility];  Service: Gynecology   • EXPLORATORY LAPAROTOMY      Age 18, ovarian cysts   • IR BIOPSY ABDOMEN  8/24/2023   • MAMMO (HISTORICAL)     • NECK SURGERY      Last Assessed 7/14/2016   • SCAPULAR MASS EXCISION Left   "   lipoma     Family History   Problem Relation Age of Onset   • Other Mother         No Pertinent Family History   • Other Father         No Pertinent Family History     Social History     Tobacco Use   • Smoking status: Former     Types: Cigarettes     Start date: 2024     Quit date: 1986     Years since quittin.8     Passive exposure: Past   • Smokeless tobacco: Never   • Tobacco comments:     Smoked on and off   Vaping Use   • Vaping status: Never Used   Substance and Sexual Activity   • Alcohol use: Yes     Alcohol/week: 1.0 standard drink of alcohol     Types: 1 Glasses of wine per week     Comment: social   • Drug use: No     Comment: hx of Cocaine use.   • Sexual activity: Yes     Partners: Male     Birth control/protection: Post-menopausal     Current Outpatient Medications on File Prior to Visit   Medication Sig   • diclofenac (VOLTAREN) 75 mg EC tablet Take 75 mg by mouth 2 (two) times a day   • metFORMIN (GLUCOPHAGE) 500 mg tablet Take 1 tablet (500 mg total) by mouth 2 (two) times a day with meals   • pregabalin (LYRICA) 75 mg capsule Take 75 mg by mouth Three times a day   • zolpidem (AMBIEN CR) 12.5 MG CR tablet Take 1 tablet (12.5 mg total) by mouth daily at bedtime as needed for sleep   • ibuprofen (MOTRIN) 800 mg tablet Take 800 mg by mouth as needed (Patient not taking: Reported on 10/15/2024)     Allergies   Allergen Reactions   • Morphine GI Intolerance and Other (See Comments)     vomiting     Objective     /80 (BP Location: Left arm, Patient Position: Standing, Cuff Size: Standard)   Pulse 103   Temp 98.8 °F (37.1 °C) (Temporal)   Ht 5' 5.91\" (1.674 m)   Wt 77.1 kg (170 lb)   SpO2 98%   BMI 27.52 kg/m²     Physical Exam      EKG: normal EKG, normal sinus rhythm, unchanged from previous tracings.      Administrative Statements     Disclaimer: This note was generated with voice recognition software.  Phonetic, grammatical, and spelling errors may be present as a result.  " Please contact provider with any concerns or questions      Genevieve Woodward PA-C

## 2024-11-01 NOTE — ASSESSMENT & PLAN NOTE
"Patient feeling as though occasional \"heart skips a beat\".  She denies chest pain, shortness of breath, dizziness, lightheadedness associated with the symptoms  Notes the symptoms spontaneously resolved  Patient unsure how frequently the symptoms do occur  She has labs in the chart which I recommend she complete  EKG reviewed from 10/29/24  Holter monitor ordered   Orders:    Holter monitor; Future    "

## 2024-11-01 NOTE — ASSESSMENT & PLAN NOTE
- pt unable to tolerate MRI without sedation due to claustrophobia   - scheduled for MRI under sedation 11/14/24

## 2024-11-01 NOTE — PATIENT INSTRUCTIONS
Pre-operative Medication Instructions    Avoid herbs or non-directed vitamins one week prior to surgery  Avoid aspirin containing medications or non-steroidal anti-inflammatory drugs one week preceding surgery  May take tylenol for pain up until the night before surgery    Seizure Medication     Medication Name     pregabalin (LYRICA) 75 mg capsule      Continue to take this medication on your normal schedule.  If this is an oral medication and you take it in the morning, then you may take this medicine with a sip of water.    Sleep Medication     Medication Name     zolpidem (AMBIEN CR) 12.5 MG CR tablet      Continue this medication up to the evening before surgery/procedure, but do not take the morning of the day of surgery.    Diabetic Medications     Medication Name     metFORMIN (GLUCOPHAGE) 500 mg tablet        Medicine Instructions for Adults with Diabetes (NO Bowel Prep)    Follow these instructions when a BOWEL PREP is NOT required for your procedure or surgery!    NOTE:  GLP Agonists taken weekly: do not take in the 7 days before your procedure. **Bariatric surgery: do not take 4 weeks prior to your procedure.    SGLT-2 Inhibitors: do not take in the 4 days before your procedure    On the Day Before Surgery/Procedure  If you are having a procedure (e.g., Colonoscopy) or surgery which DOES NOT require a bowel prep, follow the directions below based on the type of medicine you take for your diabetes.  Type of Medicine You Take Examples What to Do   Pre-Mixed Insulin Intermediate  Fxspmxx06/25, Bigzxhe89/30, Novolog 70/30, Regular Insulin Take 1/2 your regular dose the evening before our procedure.   Rapid/Fast Acting  Insulin and/or Long-Acting Insulin Humalog U200, NovoLog, Apidra,  Lantus, Levemir, Tresiba, Toujeo,  Fias, Basaglar Take your FULL regular dose the day before procedure.   Oral Diabetic Medicines (sulfonylurea) Glipizide/Glimepiride/  Glucotrol Take your regular dose with dinner the evening  before your procedure.   Other Oral Diabetic Medicines Metformin, Glucophage, Glucophage  XR, Riomet, Glumetza, Actose,  Avandia, Gl set, Prandin Take your regular dose with dinner the evening before your procedure   GLP Agonists Adlyxin, Byetta, Bydureon,  Ozempic, Soliqua, Tanzeum,  Trulicity, Victoza, Saxenda,  Rybelsus, Wegovy, Mounjaro, Zepbound If taken daily, take as normal  If taken weekly, do not take this medicine for 7 days before your procedure including the day of the procedure (resume taking after the procedure). **Bariatric surgery: do not take 4 weeks prior to procedure   SGLT-2 Inhibitors Jardiance, Invokana, Farxiga, Steglatro, Brenzavvy, Qtern, Segluromet Glyxambi, Synjardy, Synjardy XR, Invokamet, InvokametXR, Trijary XR, Xigduo X Do not take for 4 days before your procedure including the day of the procedure (resume taking after the procedure)   This educational material has been approved by the Patient Education Advisory Committee.    On the Day of Surgery/Procedure  Follow the directions below based on the type of medicine you take for your diabetes.  Type of Medicine You Take  Examples What to Do   Long-Acting Insulin Lantus, Levemir, Tresiba,  Toujeo, Basaglar, Semglee If you normally take your Long Acting Insulin in the morning, take the full dose as scheduled.   GLP-I Agonists Adlyxin, Byetta, Bydureon,  Ozempic, Soliqua, Tanzeum,  Trulicity, Victoza, Saxenda,  Rybelsus, Mounjaro Do NOT take this medicine on the day of your procedure (resume taking after the procedure)   Except for the morning Long-Acting Insulin, DO NOT take ANY diabetic medicine on the day of your procedure unless you were instructed by the doctor who manages your diabetes medicines.  Continue to check your blood sugars.  If you have an insulin pump, ask your endocrinologist for instructions at least 3 days before your procedure. NOTE: If you are not able to ask your endocrinologist in advance, on the day of the  procedure set your insulin pump to your basal rate only. Bring your insulin pump supplies to the hospital.    If you have any questions about taking your diabetes medicines prior to your procedure, please contact the doctor who manages your diabetes medicines.

## 2024-11-02 ENCOUNTER — APPOINTMENT (OUTPATIENT)
Dept: LAB | Facility: CLINIC | Age: 54
End: 2024-11-02
Payer: COMMERCIAL

## 2024-11-02 DIAGNOSIS — E78.49 OTHER HYPERLIPIDEMIA: ICD-10-CM

## 2024-11-02 DIAGNOSIS — R73.01 IMPAIRED FASTING GLUCOSE: ICD-10-CM

## 2024-11-02 DIAGNOSIS — Z13.228 SCREENING FOR PHENYLKETONURIA (PKU): ICD-10-CM

## 2024-11-02 DIAGNOSIS — R79.89 HYPOURICEMIA: ICD-10-CM

## 2024-11-02 LAB
ALBUMIN SERPL BCG-MCNC: 4.1 G/DL (ref 3.5–5)
ALP SERPL-CCNC: 47 U/L (ref 34–104)
ALT SERPL W P-5'-P-CCNC: 34 U/L (ref 7–52)
ANION GAP SERPL CALCULATED.3IONS-SCNC: 5 MMOL/L (ref 4–13)
AST SERPL W P-5'-P-CCNC: 23 U/L (ref 13–39)
BASOPHILS # BLD AUTO: 0.02 THOUSANDS/ΜL (ref 0–0.1)
BASOPHILS NFR BLD AUTO: 1 % (ref 0–1)
BILIRUB SERPL-MCNC: 0.35 MG/DL (ref 0.2–1)
BUN SERPL-MCNC: 23 MG/DL (ref 5–25)
CALCIUM SERPL-MCNC: 8.8 MG/DL (ref 8.4–10.2)
CHLORIDE SERPL-SCNC: 106 MMOL/L (ref 96–108)
CHOLEST SERPL-MCNC: 293 MG/DL
CO2 SERPL-SCNC: 26 MMOL/L (ref 21–32)
CREAT SERPL-MCNC: 0.97 MG/DL (ref 0.6–1.3)
EOSINOPHIL # BLD AUTO: 0.13 THOUSAND/ΜL (ref 0–0.61)
EOSINOPHIL NFR BLD AUTO: 3 % (ref 0–6)
ERYTHROCYTE [DISTWIDTH] IN BLOOD BY AUTOMATED COUNT: 13 % (ref 11.6–15.1)
EST. AVERAGE GLUCOSE BLD GHB EST-MCNC: 114 MG/DL
GFR SERPL CREATININE-BSD FRML MDRD: 66 ML/MIN/1.73SQ M
GLUCOSE P FAST SERPL-MCNC: 90 MG/DL (ref 65–99)
HBA1C MFR BLD: 5.6 %
HCT VFR BLD AUTO: 38.8 % (ref 34.8–46.1)
HDLC SERPL-MCNC: 63 MG/DL
HGB BLD-MCNC: 12.5 G/DL (ref 11.5–15.4)
IMM GRANULOCYTES # BLD AUTO: 0.01 THOUSAND/UL (ref 0–0.2)
IMM GRANULOCYTES NFR BLD AUTO: 0 % (ref 0–2)
LDLC SERPL CALC-MCNC: 215 MG/DL (ref 0–100)
LYMPHOCYTES # BLD AUTO: 1.72 THOUSANDS/ΜL (ref 0.6–4.47)
LYMPHOCYTES NFR BLD AUTO: 39 % (ref 14–44)
MCH RBC QN AUTO: 30.7 PG (ref 26.8–34.3)
MCHC RBC AUTO-ENTMCNC: 32.2 G/DL (ref 31.4–37.4)
MCV RBC AUTO: 95 FL (ref 82–98)
MONOCYTES # BLD AUTO: 0.37 THOUSAND/ΜL (ref 0.17–1.22)
MONOCYTES NFR BLD AUTO: 8 % (ref 4–12)
NEUTROPHILS # BLD AUTO: 2.18 THOUSANDS/ΜL (ref 1.85–7.62)
NEUTS SEG NFR BLD AUTO: 49 % (ref 43–75)
NONHDLC SERPL-MCNC: 230 MG/DL
NRBC BLD AUTO-RTO: 0 /100 WBCS
PLATELET # BLD AUTO: 315 THOUSANDS/UL (ref 149–390)
PMV BLD AUTO: 9 FL (ref 8.9–12.7)
POTASSIUM SERPL-SCNC: 4.2 MMOL/L (ref 3.5–5.3)
PROT SERPL-MCNC: 6.7 G/DL (ref 6.4–8.4)
RBC # BLD AUTO: 4.07 MILLION/UL (ref 3.81–5.12)
SODIUM SERPL-SCNC: 137 MMOL/L (ref 135–147)
TRIGL SERPL-MCNC: 77 MG/DL
TSH SERPL DL<=0.05 MIU/L-ACNC: 1.45 UIU/ML (ref 0.45–4.5)
WBC # BLD AUTO: 4.43 THOUSAND/UL (ref 4.31–10.16)

## 2024-11-02 PROCEDURE — 84443 ASSAY THYROID STIM HORMONE: CPT

## 2024-11-02 PROCEDURE — 83036 HEMOGLOBIN GLYCOSYLATED A1C: CPT

## 2024-11-02 PROCEDURE — 36415 COLL VENOUS BLD VENIPUNCTURE: CPT

## 2024-11-02 PROCEDURE — 80061 LIPID PANEL: CPT

## 2024-11-02 PROCEDURE — 80053 COMPREHEN METABOLIC PANEL: CPT

## 2024-11-05 ENCOUNTER — OFFICE VISIT (OUTPATIENT)
Age: 54
End: 2024-11-05
Payer: COMMERCIAL

## 2024-11-05 VITALS
WEIGHT: 173.4 LBS | TEMPERATURE: 98.5 F | SYSTOLIC BLOOD PRESSURE: 116 MMHG | DIASTOLIC BLOOD PRESSURE: 86 MMHG | BODY MASS INDEX: 27.87 KG/M2 | HEART RATE: 72 BPM | OXYGEN SATURATION: 99 % | HEIGHT: 66 IN

## 2024-11-05 DIAGNOSIS — Z98.82 H/O BILATERAL BREAST IMPLANTS: Primary | ICD-10-CM

## 2024-11-05 DIAGNOSIS — E66.811 OBESITY, CLASS I, BMI 30-34.9: ICD-10-CM

## 2024-11-05 DIAGNOSIS — Z00.00 ANNUAL PHYSICAL EXAM: ICD-10-CM

## 2024-11-05 PROCEDURE — 99396 PREV VISIT EST AGE 40-64: CPT | Performed by: NURSE PRACTITIONER

## 2024-11-05 NOTE — ASSESSMENT & PLAN NOTE
-Up-to-date with fasting blood work  -Continue with well-balanced diet, encouraged daily exercise  -She is due for mammogram, colon cancer screening and cervical cancer screening  -Defers any additional vaccinations  -Encourage monthly self breast examination  -Follow-up in 1 year for annual physical or sooner if needed

## 2024-11-05 NOTE — PROGRESS NOTES
[facility] - [facility] [place]  Well Adult Female Physical Visit  Patient ID: Catrachita Poole    : 1970  Age/Gender: 54 y.o. female     DATE: 2024      Assessment/Plan:    Annual physical exam  -Up-to-date with fasting blood work  -Continue with well-balanced diet, encouraged daily exercise  -She is due for mammogram, colon cancer screening and cervical cancer screening  -Defers any additional vaccinations  -Encourage monthly self breast examination  -Follow-up in 1 year for annual physical or sooner if needed       Diagnoses and all orders for this visit:    H/O bilateral breast implants  -     MRI breast wo con silicone implant eval only bilat; Future    Obesity, Class I, BMI 30-34.9  -     metFORMIN (GLUCOPHAGE) 500 mg tablet; Take 1 tablet (500 mg total) by mouth 2 (two) times a day with meals    Annual physical exam          Subjective:     Catrachita Poole is a 54 y.o. female who presents to the office on 2024 for a health maintenance physical.    HPI  The following portions of the patient's history were reviewed and updated as appropriate: allergies, current medications, past family history, past medical history, past social history, past surgical history, and problem list.    Pt reports overall health:  good, neck and back pain  Last Physical: unknown  Last GYN Exam:  yearly    Healthy Diet:  On [place] diet, hyperlipidemia noted to get worse  Routine Exercise:  2 days a week  Weight Concerns:  would like to lose more    Problems with vision:  denies, readers  Last Eye Exam:  9 months ago     Problems with Hearing:  denies    Routine Dental Exams:  every 6 months    Smoking History:  former  ETOH Use:social  Illegal Drug Use:  denies  Caffeine Use:  2-3 cups a day     Reproductive Health:    Last PAP:    History of abnormal PAP: in the past  Sexually Active:  currently   Vaginal Discharge:  denies     Last Mammo: 2017  History of  abnormal Mammo: denies   Self Breast Exams:  monthly     Depression Screening:  PHQ-2/9 Depression Screening               Last Colonoscopy:  has cologuard kit   Family History of Colon CA:  denies      Last Labs:  2024    Review of Systems   Constitutional:  Negative for activity change, appetite change, chills, diaphoresis and fever.   HENT:  Negative for congestion, ear discharge, ear pain, postnasal drip, rhinorrhea, sinus pressure, sinus pain and sore throat.    Eyes:  Negative for pain, discharge, itching and visual disturbance.   Respiratory:  Negative for cough, chest tightness, shortness of breath and wheezing.    Cardiovascular:  Negative for chest pain, palpitations and leg swelling.   Gastrointestinal:  Negative for abdominal pain, constipation, diarrhea, nausea and vomiting.   Endocrine: Negative for polydipsia, polyphagia and polyuria.   Genitourinary:  Negative for difficulty urinating, dysuria and urgency.   Musculoskeletal:  Positive for arthralgias. Negative for back pain and neck pain.   Skin:  Negative for rash and wound.   Neurological:  Negative for dizziness, weakness, numbness and headaches.         Patient Active Problem List   Diagnosis    Neck pain    Anxiety    Insomnia    Pre-diabetes    Other hyperlipidemia    Cigarette nicotine dependence without complication    Bilateral carpal tunnel syndrome    Mild episode of recurrent major depressive disorder (HCC)    DDD (degenerative disc disease), lumbar    Thyroid nodule    Current severe episode of major depressive disorder without psychotic features, unspecified whether recurrent (HCC)    Acute bilateral low back pain without sciatica    Spondylosis of lumbar spine    Claustrophobia    Palpitations    Annual physical exam       Past Medical History:   Diagnosis Date    Adjustment disorder     Anxiety     Diabetes mellitus (HCC)     History of cocaine use     History of tobacco use     HLD (hyperlipidemia)     Lipoma of shoulder     PONV  (postoperative nausea and vomiting)     Sesamoiditis 3/30/2020    Severe cervical dysplasia 2017    Tobacco dependence syndrome        Past Surgical History:   Procedure Laterality Date    BREAST SURGERY      Augment.     CERVICAL BIOPSY N/A 2017    Procedure: CONE BIOPSY;  Surgeon: Enrrique Harley MD;  [facility]: [facility];  Service: Gynecology    EXPLORATORY LAPAROTOMY      Age 18, ovarian cysts    IR BIOPSY ABDOMEN  2023    MAMMO (HISTORICAL)      NECK SURGERY      Last Assessed 2016    SCAPULAR MASS EXCISION Left     lipoma         Current Outpatient Medications:     diclofenac (VOLTAREN) 75 mg EC tablet, Take 75 mg by mouth 2 (two) times a day, Disp: , Rfl:     metFORMIN (GLUCOPHAGE) 500 mg tablet, Take 1 tablet (500 mg total) by mouth 2 (two) times a day with meals, Disp: 60 tablet, Rfl: 5    pregabalin (LYRICA) 75 mg capsule, Take 75 mg by mouth Three times a day, Disp: , Rfl:     zolpidem (AMBIEN CR) 12.5 MG CR tablet, Take 1 tablet (12.5 mg total) by mouth daily at bedtime as needed for sleep, Disp: 30 tablet, Rfl: 0    ibuprofen (MOTRIN) 800 mg tablet, Take 800 mg by mouth as needed (Patient not taking: Reported on 10/15/2024), Disp: , Rfl:     Allergies   Allergen Reactions    Morphine GI Intolerance and Other (See Comments)     vomiting       Social History     Socioeconomic History    Marital status: /Civil Union     Spouse name: None    Number of children: None    Years of education: None    Highest education level: None   Occupational History    None   Tobacco Use    Smoking status: Former     Types: Cigarettes     Start date: 2024     Quit date: 1986     Years since quittin.8     Passive exposure: Past    Smokeless tobacco: Never    Tobacco comments:     Smoked on and off   Vaping Use    Vaping status: Never Used   Substance and Sexual Activity    Alcohol use: Yes     Alcohol/week: 1.0 standard drink of alcohol     Types: 1 Glasses of wine per week     Comment:  social    Drug use: No     Comment: hx of Cocaine use.    Sexual activity: Yes     Partners: Male     Birth control/protection: Post-menopausal   Other Topics Concern    None   Social History Narrative    None     Social Determinants of Health     Financial Resource Strain: Low Risk  (6/25/2024)    Received from [facility]    Overall Financial Resource Strain (CARDIA)     Difficulty of Paying Living Expenses: Not very hard   Food Insecurity: Food Insecurity Present (6/25/2024)    Received from [facility]    Hunger Vital Sign     Worried About Running Out of Food in the Last Year: Sometimes true     Ran Out of Food in the Last Year: Never true   Transportation Needs: No Transportation Needs (6/25/2024)    Received from [facility]    PRAPARE - Transportation     Lack of Transportation (Medical): No     Lack of Transportation (Non-Medical): No   Physical Activity: Not on file   Stress: Not on file   Social Connections: Not on file   Intimate Partner Violence: Not At Risk (6/25/2024)    Received from [facility]    Humiliation, Afraid, Rape, and Kick questionnaire     Fear of Current or Ex-Partner: No     Emotionally Abused: No     Physically Abused: No     Sexually Abused: No   Housing Stability: Low Risk  (6/25/2024)    Received from [facility]    Housing Stability Vital Sign     Unable to Pay for Housing in the Last Year: No     Number of Times Moved in the Last Year: 0     Homeless in the Last Year: No       Family History   Problem Relation Age of Onset    Other Mother         No Pertinent Family History    Other Father         No Pertinent Family History       Immunization History   Administered Date(s) Administered    COVID-19 MODERNA VACC 0.5 ML IM 05/13/2021, 06/11/2021    INFLUENZA 09/19/2015, 11/12/2018, 10/06/2021, 10/12/2021, 10/05/2022    Influenza Quadrivalent Preservative Free 3 years and older IM 12/07/2016     "Influenza Recombinant Preservative Free Im 11/01/2024    Influenza, injectable, quadrivalent, preservative free 0.5 mL 11/12/2018    MMR 11/14/2017, 11/14/2017        Health Maintenance   Topic Date Due    Hepatitis C Screening  Never done    HIV Screening  Never done    Annual Physical  Never done    Hepatitis A Vaccine (1 of 2 - Risk 2-dose series) Never done    DTaP,Tdap,and Td Vaccines (1 - Tdap) Never done    Colorectal Cancer Screening  Never done    Breast Cancer Screening: Mammogram  04/07/2018    Zoster Vaccine (1 of 2) Never done    COVID-19 Vaccine (3 - 2023-24 season) 09/01/2024    Depression Screening  10/15/2025    Cervical Cancer Screening  05/09/2028    RSV Vaccine Age 60+ Years (1 - 1-dose 60+ series) 02/03/2031    Influenza Vaccine  Completed    RSV Vaccine age 0-20 Months  Aged Out    Pneumococcal Vaccine: Pediatrics (0 to 5 Years) and At-Risk Patients (6 to 64 Years)  Aged Out    HIB Vaccine  Aged Out    IPV Vaccine  Aged Out    Meningococcal ACWY Vaccine  Aged Out    HPV Vaccine  Aged Out         Objective:  Vitals:    11/05/24 0913   BP: 116/86   BP Location: Left arm   Patient Position: Sitting   Cuff Size: Standard   Pulse: 72   Temp: 98.5 °F (36.9 °C)   TempSrc: Temporal   SpO2: 99%   Weight: 78.7 kg (173 lb 6.4 oz)   Height: 5' 5.91\" (1.674 m)     Wt Readings from Last 3 Encounters:   11/05/24 78.7 kg (173 lb 6.4 oz)   11/01/24 77.1 kg (170 lb)   07/10/24 79.9 kg (176 lb 3.2 oz)     Body mass index is 28.06 kg/m².  No results found.       Physical Exam  Constitutional:       General: She is not in acute distress.     Appearance: She is well-developed. She is not diaphoretic.   HENT:      Head: Normocephalic and atraumatic.      Right Ear: External ear normal.      Left Ear: External ear normal.      Nose: Nose normal.      Mouth/Throat:      Mouth: Mucous membranes are moist.      Pharynx: No oropharyngeal exudate or posterior oropharyngeal erythema.   Eyes:      General:         Right eye: " No discharge.         Left eye: No discharge.      Conjunctiva/sclera: Conjunctivae normal.      Pupils: Pupils are equal, round, and reactive to light.   Neck:      Thyroid: No thyromegaly.   Cardiovascular:      Rate and Rhythm: Normal rate and regular rhythm.      Heart sounds: Normal heart sounds. No murmur heard.     No friction rub. No gallop.   Pulmonary:      Effort: Pulmonary effort is normal. No respiratory distress.      Breath sounds: Normal breath sounds. No stridor. No wheezing or rales.   Abdominal:      General: Bowel sounds are normal. There is no distension.      Palpations: Abdomen is soft.      Tenderness: There is no abdominal tenderness.   Musculoskeletal:      Cervical back: Normal range of motion and neck supple.   Lymphadenopathy:      Cervical: No cervical adenopathy.   Skin:     General: Skin is warm and dry.      Findings: No erythema or rash.   Neurological:      Mental Status: She is alert and oriented to person, place, and time.   Psychiatric:         Behavior: Behavior normal.         Thought Content: Thought content normal.         Judgment: Judgment normal.             Future Appointments   Date Time Provider Department Center   11/19/2024  9:45 AM [facility]/EKG 1 [facility]   4/18/2025  9:00 AM THOMAS Mcmanus [facility]       THOMAS Mcmanus  [facility] [place]    Patient Care Team:  THOMAS Mcmanus as PCP - General (Family Medicine)  THOMAS Mcmanus as PCP - PCP-[facility] ([facility])  DO Enrrique Garcia MD Darius Desai, MD (Surgical Oncology)

## 2024-11-13 ENCOUNTER — TELEMEDICINE (OUTPATIENT)
Age: 54
End: 2024-11-13
Payer: COMMERCIAL

## 2024-11-13 VITALS — BODY MASS INDEX: 28 KG/M2 | WEIGHT: 173 LBS

## 2024-11-13 DIAGNOSIS — J01.00 ACUTE NON-RECURRENT MAXILLARY SINUSITIS: Primary | ICD-10-CM

## 2024-11-13 PROCEDURE — 99213 OFFICE O/P EST LOW 20 MIN: CPT | Performed by: NURSE PRACTITIONER

## 2024-11-13 NOTE — ASSESSMENT & PLAN NOTE
Will start Augementin.   Encouraged testing for COVID and FLU   Rest and fluids advised.   Educated that the course of this illness could be 2-4 weeks.   Discussed symptomatic relief, such as warm steam inhalations, tylenol/ibuprofen for fevers and body aches, rest, and drink plenty of fluids.  Warm salt gargles for sore throat.   Discussed red flag signs to go to the ER, such as chest pain or shortness of breath.   Return to the office for reevaluation if symptoms worsen or do not improve in 1-2 weeks       Orders:    amoxicillin-clavulanate (AUGMENTIN) 875-125 mg per tablet; Take 1 tablet by mouth every 12 (twelve) hours for 7 days

## 2024-11-13 NOTE — PROGRESS NOTES
Virtual Regular Visit  Name: Catrachita Poole      : 1970      MRN: [id number]  Encounter Provider: THOMAS Mcmanus  Encounter Date: 2024   Encounter department: [facility]    Verification of patient location:    Patient is located at Home in the following state in which I hold an active license [place]    Assessment & Plan  Acute non-recurrent maxillary sinusitis  Will start Augementin.   Encouraged testing for COVID and FLU   Rest and fluids advised.   Educated that the course of this illness could be 2-4 weeks.   Discussed symptomatic relief, such as warm steam inhalations, tylenol/ibuprofen for fevers and body aches, rest, and drink plenty of fluids.  Warm salt gargles for sore throat.   Discussed red flag signs to go to the ER, such as chest pain or shortness of breath.   Return to the office for reevaluation if symptoms worsen or do not improve in 1-2 weeks       Orders:    amoxicillin-clavulanate (AUGMENTIN) 875-125 mg per tablet; Take 1 tablet by mouth every 12 (twelve) hours for 7 days         Encounter provider THOMAS Mcmanus    The patient was identified by name and date of birth. Catrachita Poole was informed that this is a telemedicine visit and that the visit is being conducted through the Epic Embedded platform. She agrees to proceed..  My office door was closed. No one else was in the room.  She acknowledged consent and understanding of privacy and security of the video platform. The patient has agreed to participate and understands they can discontinue the visit at any time.    Patient is aware this is a billable service.     History of Present Illness     Patient presents today with URI-like symptoms.    Denies sick contacts        URI   This is a new problem. Episode onset: . There has been no fever. Associated symptoms include congestion, rhinorrhea, sinus pain and a sore throat. Pertinent negatives include no abdominal  pain, chest pain, coughing, diarrhea, dysuria, ear pain, headaches, nausea, neck pain, rash, vomiting or wheezing. She has tried decongestant (salt water) for the symptoms. The treatment provided no relief.       History obtained from : patient  Review of Systems   Constitutional:  Positive for fatigue. Negative for activity change, appetite change, chills, diaphoresis and fever.   HENT:  Positive for congestion, rhinorrhea, sinus pain and sore throat. Negative for ear discharge, ear pain, postnasal drip and sinus pressure.    Eyes:  Negative for pain, discharge, itching and visual disturbance.   Respiratory:  Negative for cough, chest tightness, shortness of breath and wheezing.    Cardiovascular:  Negative for chest pain, palpitations and leg swelling.   Gastrointestinal:  Negative for abdominal pain, constipation, diarrhea, nausea and vomiting.   Endocrine: Negative for polydipsia, polyphagia and polyuria.   Genitourinary:  Negative for difficulty urinating, dysuria and urgency.   Musculoskeletal:  Negative for arthralgias, back pain and neck pain.   Skin:  Negative for rash and wound.   Neurological:  Negative for dizziness, weakness, numbness and headaches.           Objective     Wt 78.5 kg (173 lb)   BMI 28.00 kg/m²   Physical Exam  Constitutional:       Appearance: Normal appearance. She is not ill-appearing.   Eyes:      General: No scleral icterus.  Pulmonary:      Effort: No respiratory distress.   Neurological:      Mental Status: She is alert.   Psychiatric:         Mood and Affect: Mood normal.         Behavior: Behavior normal.         Visit Time  Total Visit Duration: 15

## 2024-11-20 DIAGNOSIS — G47.00 INSOMNIA, UNSPECIFIED TYPE: ICD-10-CM

## 2024-11-20 RX ORDER — ZOLPIDEM TARTRATE 12.5 MG/1
12.5 TABLET, FILM COATED, EXTENDED RELEASE ORAL
Qty: 30 TABLET | Refills: 0 | Status: SHIPPED | OUTPATIENT
Start: 2024-11-20

## 2024-11-20 NOTE — TELEPHONE ENCOUNTER
Reason for call:   [x] Refill   [] Prior Auth  [] Other:     Office:   [x] PCP/Provider -   [] Specialty/Provider -     Medication: zolpidem (AMBIEN CR) 12.5 MG CR Take 1 tablet (12.5 mg total) by mouth daily at bedtime as needed for sleep     Pharmacy: [facility] [place]     Does the patient have enough for 3 days?   [] Yes   [x] No - Send as HP to POD

## 2024-12-13 PROBLEM — J01.00 ACUTE NON-RECURRENT MAXILLARY SINUSITIS: Status: RESOLVED | Noted: 2022-10-27 | Resolved: 2024-12-13

## 2024-12-19 DIAGNOSIS — G47.00 INSOMNIA, UNSPECIFIED TYPE: ICD-10-CM

## 2024-12-19 RX ORDER — ZOLPIDEM TARTRATE 12.5 MG/1
12.5 TABLET, FILM COATED, EXTENDED RELEASE ORAL
Qty: 30 TABLET | Refills: 0 | Status: SHIPPED | OUTPATIENT
Start: 2024-12-19

## 2024-12-19 NOTE — TELEPHONE ENCOUNTER
Reason for call:   [x] Refill   [] Prior Auth  [] Other:     Office:   [x] PCP/Provider -   [] Specialty/Provider -     Medication: zolpidem     Dose/Frequency: 12.5 mg daily as needed     Quantity: 30    Pharmacy[facility]     Does the patient have enough for 3 days?   [] Yes   [x] No - Send as HP to POD

## 2024-12-26 ENCOUNTER — TELEPHONE (OUTPATIENT)
Age: 54
End: 2024-12-26

## 2024-12-26 NOTE — TELEPHONE ENCOUNTER
I received a Care Request from patient to schedule for:    First Name: Catrachita  Last Name: Poole  Email: [email or web address]  Phone: [phone]  Subject: Request an appointment  Where does it hurt? My neck hurts especially when I'm on the computer and my left hand is numb often it bothers me wakes me up    I lvm to [facility] at [phone].

## 2025-01-04 ENCOUNTER — TELEPHONE (OUTPATIENT)
Dept: OBGYN CLINIC | Facility: HOSPITAL | Age: 55
End: 2025-01-04

## 2025-01-20 DIAGNOSIS — G47.00 INSOMNIA, UNSPECIFIED TYPE: ICD-10-CM

## 2025-01-20 RX ORDER — ZOLPIDEM TARTRATE 12.5 MG/1
12.5 TABLET, FILM COATED, EXTENDED RELEASE ORAL
Qty: 30 TABLET | Refills: 0 | Status: SHIPPED | OUTPATIENT
Start: 2025-01-20

## 2025-01-20 NOTE — TELEPHONE ENCOUNTER
Reason for call:   [x] Refill   [] Prior Auth  [] Other:     Office:   [x] PCP/Provider -   [] Specialty/Provider -     Medication: zolpidem (AMBIEN CR) 12.5 MG     Dose/Frequency: Take 1 tablet (12.5 mg total) by mouth daily at bedtime as needed     Quantity: 30    Pharmacy: [facility] - [place], [place] - [address]     Does the patient have enough for 3 days?   [] Yes   [x] No - Send as HP to POD

## 2025-01-24 ENCOUNTER — TELEPHONE (OUTPATIENT)
Age: 55
End: 2025-01-24

## 2025-01-24 NOTE — TELEPHONE ENCOUNTER
Catrachita called in because she was returning back a call. Informed her about appointment on 4/18/25 would have to be rescheduled. Unable to reschedule because appointment was locked. She asked if someone can call back at 4. She had to go back to work or to message her back on [employer].

## 2025-02-03 ENCOUNTER — HOSPITAL ENCOUNTER (OUTPATIENT)
Dept: RADIOLOGY | Facility: HOSPITAL | Age: 55
Discharge: HOME | End: 2025-02-03
Attending: ORTHOPAEDIC SURGERY
Payer: COMMERCIAL

## 2025-02-03 ENCOUNTER — OFFICE VISIT (OUTPATIENT)
Dept: OBGYN CLINIC | Facility: HOSPITAL | Age: 55
End: 2025-02-03
Payer: COMMERCIAL

## 2025-02-03 VITALS — WEIGHT: 173.06 LBS | HEIGHT: 66 IN | BODY MASS INDEX: 27.81 KG/M2

## 2025-02-03 DIAGNOSIS — R52 PAIN: Primary | ICD-10-CM

## 2025-02-03 DIAGNOSIS — R52 PAIN: ICD-10-CM

## 2025-02-03 PROCEDURE — 99204 OFFICE O/P NEW MOD 45 MIN: CPT | Performed by: ORTHOPAEDIC SURGERY

## 2025-02-03 PROCEDURE — 72040 X-RAY EXAM NECK SPINE 2-3 VW: CPT

## 2025-02-03 NOTE — PROGRESS NOTES
Assessment & Plan/Medical Decision Makin y.o. female with Neck Pain and imaging findings most notable for Cervical Spondylosis        The clinical, physical and imaging findings were reviewed with the patient.  Catrachita  has a constellation of findings consistent with cervical degenerative disease.  Fortunately patient remains neurologically intact and functional.  We discussed the treatment options including physical therapy, at home exercises, activity modifications, chiropractic medicine, oral medications, interventional spine procedures.  At this time recommend continued conservative treatments.  Patient does follow with [facility] pain management Dr. Pradeep Calhoun and she will follow-up for ongoing interventional procedures including possible consideration of a spinal cord stimulator.  At this time no spine surgery indicated with no instability or stenosis appreciated.  Recommend patient continue with ongoing strengthening exercises/HEP.    Patient instructed to return to office/ER sooner if symptoms are not improving, getting worse, or new worrisome/neurologic symptoms arise.  Patient will follow up as needed.     Subjective:      Chief Complaint: Neck Pain    HPI:  Catrachita Poole is a 55 y.o. female presenting for initial visit with chief complaint of neck pain.  Pain has been consistent and increasing over the last 3-4 years. She reports having two cervical spine fusions, the first was about 16 years ago and the second was about 6-7 years ago. She states she has pain with extension, but relief with flexion. She notes all the pain is in her neck. She does have numbness and tingling into the left hand, specifically the long, ring and thumb. She has been worked up for carpal tunnel syndrome and states the work up was negative. She has had physical therapy, chiropractic care, massage therapy and cortisone injections in the past. She notes none of these treatments have been  relieving of her pain.    Denies any sheridan trauma. Denies fever or chills, no night sweats. Denies any bladder or bowel changes.      Conservative therapy includes the following:   Medications: Diclofinac    Injections: yes     Physical Therapy: yes  Chiropractic Medicine: has not attempted  Accupunture/Massage Therapy: She has attempted massage therapy  These therapeutic modalities were ineffective at providing sustained pain relief/functional improvement.     Nicotine dependent: Former  Occupation:   Living situation: Lives with family   ADLs: patient is able to perform     Objective:     Family History   Problem Relation Age of Onset    Other Mother         No Pertinent Family History    Other Father         No Pertinent Family History       Past Medical History:   Diagnosis Date    Adjustment disorder     Anxiety     Diabetes mellitus (HCC)     History of cocaine use     History of tobacco use     HLD (hyperlipidemia)     Lipoma of shoulder     PONV (postoperative nausea and vomiting)     Sesamoiditis 3/30/2020    Severe cervical dysplasia 6/16/2017    Tobacco dependence syndrome        Current Outpatient Medications   Medication Sig Dispense Refill    diclofenac (VOLTAREN) 75 mg EC tablet Take 75 mg by mouth 2 (two) times a day      metFORMIN (GLUCOPHAGE) 500 mg tablet Take 1 tablet (500 mg total) by mouth 2 (two) times a day with meals 60 tablet 5    zolpidem (AMBIEN CR) 12.5 MG CR tablet Take 1 tablet (12.5 mg total) by mouth daily at bedtime as needed for sleep 30 tablet 0    ibuprofen (MOTRIN) 800 mg tablet Take 800 mg by mouth as needed (Patient not taking: Reported on 10/15/2024)      pregabalin (LYRICA) 75 mg capsule Take 75 mg by mouth Three times a day       No current facility-administered medications for this visit.       Past Surgical History:   Procedure Laterality Date    BREAST SURGERY      Augment.     CERVICAL BIOPSY N/A 6/16/2017    Procedure: CONE BIOPSY;  Surgeon:  Enrrique Harley MD;  Location: [facility];  Service: Gynecology    EXPLORATORY LAPAROTOMY      Age 18, ovarian cysts    IR BIOPSY ABDOMEN  2023    MAMMO (HISTORICAL)      NECK SURGERY      Last Assessed 2016    SCAPULAR MASS EXCISION Left     lipoma       Social History     Socioeconomic History    Marital status: /Civil Union     Spouse name: Not on file    Number of children: Not on file    Years of education: Not on file    Highest education level: Not on file   Occupational History    Not on file   Tobacco Use    Smoking status: Former     Types: Cigarettes     Start date: 2024     Quit date: 1986     Years since quittin.1     Passive exposure: Past    Smokeless tobacco: Never    Tobacco comments:     Smoked on and off   Vaping Use    Vaping status: Never Used   Substance and Sexual Activity    Alcohol use: Yes     Alcohol/week: 1.0 standard drink of alcohol     Types: 1 Glasses of wine per week     Comment: social    Drug use: No     Comment: hx of Cocaine use.    Sexual activity: Yes     Partners: Male     Birth control/protection: Post-menopausal   Other Topics Concern    Not on file   Social History Narrative    Not on file     Social Drivers of Health     Financial Resource Strain: Low Risk  (2024)    Received from [facility]    Overall Financial Resource Strain (CARDIA)     Difficulty of Paying Living Expenses: Not very hard   Food Insecurity: Food Insecurity Present (2024)    Received from [facility]    Hunger Vital Sign     Worried About Running Out of Food in the Last Year: Sometimes true     Ran Out of Food in the Last Year: Never true   Transportation Needs: No Transportation Needs (2024)    Received from [facility]    PRAPARE - Transportation     Lack of Transportation (Medical): No     Lack of Transportation (Non-Medical): No   Physical Activity: Not on file   Stress: Not on file   Social Connections:  "Not on file   Intimate Partner Violence: Not At Risk (6/25/2024)    [facility]    Humiliation, Afraid, Rape, and Kick questionnaire     Fear of Current or Ex-Partner: No     Emotionally Abused: No     Physically Abused: No     Sexually Abused: No   Housing Stability: Low Risk  (6/25/2024)    Received from [facility]    Housing Stability Vital Sign     Unable to Pay for Housing in the Last Year: No     Number of Times Moved in the Last Year: 0     Homeless in the Last Year: No       Allergies   Allergen Reactions    Morphine GI Intolerance and Other (See Comments)     vomiting       Review of Systems  General- denies fever/chills  HEENT- denies hearing loss or sore throat  Eyes- denies eye pain or visual disturbances, denies red eyes  Respiratory- denies cough or SOB  Cardio- denies chest pain or palpitations  GI- denies abdominal pain  Endocrine- denies urinary frequency  Urinary- denies pain with urination  Musculoskeletal- Negative except noted above  Skin- denies rashes or wounds  Neurological- denies dizziness or headache  Psychiatric- denies anxiety or difficulty concentrating    Physical Exam  Ht 5' 5.91\" (1.674 m)   Wt 78.5 kg (173 lb 1 oz)   BMI 28.01 kg/m²     General/Constitutional: No apparent distress: well-nourished and well developed.  Lymphatic: No appreciable lymphadenopathy  Respiratory: Non-labored breathing  Vascular: No edema, swelling or tenderness, except as noted in detailed exam.  Integumentary: No impressive skin lesions present, except as noted in detailed exam.  Psych: Normal mood and affect, oriented to person, place and time.  MSK: normal other than stated in HPI and exam  Gait & balance: no evidence of myelopathic gait, ambulates Independently     Cervical  spine range of motion:  -Forward flexion chin to chest  -Extension to 60  There is no point tenderness with palpation along the posterior cervical, " "thoracic, lumbar spine.     Neurologic:  Upper Extremity Motor Function    Right  Left    Deltoid  5/5  5/5    Bicep  5/5  5/5    Wrist extension  5/5  5/5    Tricep  5/5  5/5    Finger flexion/  5/5  5/5    Hand intrinsic  5/5  5/5      Lower Extremity Motor Function    Right  Left    Iliopsoas  5/5  5/5    Quadriceps 5/5 5/5   Tibialis anterior  5/5  5/5    EHL  5/5  5/5    Gastroc. muscle  5/5  5/5    Heel rise  5/5  5/5    Toe rise  5/5  5/5      Sensory: light touch is intact to bilateral upper and lower extremities     Reflexes: Intact    Other tests:  Spurling's: negative  Vilchis's: negative  Clonus: negative  Inverted Radial:   Tandem gait: negative  Carpal Tinel/Phalen: negative bilateral   Cubital Tinel: negative bilateral   Shoulder: painless active & passive ROM bilateral     Diagnostic Tests   IMAGING: I have personally reviewed the images and these are my findings:  Cervical Spine X-rays from 2/3/2025: multi level cervical spondylosis with loss of disc height, osteophyte formation and uncovertebral hypertrophy, no apparent spondylolisthesis, no appreciated lytic/blastic lesions, no obvious instability, intact C5-7 ACDF with instrumentation at C6-7    Cervical Spine MRI from 11/14/2024: multi level cervical disc degeneration with disc desiccation, intact C5-7 ACDF, mild adjacent segment degeneration without any significant central or foraminal stenosis    Electronic Medical Records were reviewed including [facility] notes    Procedures, if performed today     None performed       Portions of the record may have been created with voice recognition software.  Occasional wrong word or \"sound a like\" substitutions may have occurred due to the inherent limitations of voice recognition software.  Read the chart carefully and recognize, using context, where substitutions have occurred.     "

## 2025-02-17 DIAGNOSIS — G47.00 INSOMNIA, UNSPECIFIED TYPE: ICD-10-CM

## 2025-02-17 RX ORDER — ZOLPIDEM TARTRATE 12.5 MG/1
12.5 TABLET, FILM COATED, EXTENDED RELEASE ORAL
Qty: 30 TABLET | Refills: 0 | Status: SHIPPED | OUTPATIENT
Start: 2025-02-17

## 2025-02-17 NOTE — TELEPHONE ENCOUNTER
Reason for call:   [x] Refill   [] Prior Auth  [] Other:     Office:   [x] PCP/Provider -   Ordering Department: [facility] [place]  Authorized By: THOMAS Mcmanus  [] Specialty/Provider -     Medication: zolpidem (AMBIEN CR) 12.5 MG CR tablet    Dose/Frequency: Take 1 tablet (12.5 mg total) by mouth daily at bedtime as needed for sleep     Quantity: 30    Pharmacy: [address] [place] [address] [phone]    Does the patient have enough for 3 days?   [] Yes   [x] No - Send as HP to POD

## 2025-03-19 DIAGNOSIS — G47.00 INSOMNIA, UNSPECIFIED TYPE: ICD-10-CM

## 2025-03-19 RX ORDER — ZOLPIDEM TARTRATE 12.5 MG/1
12.5 TABLET, FILM COATED, EXTENDED RELEASE ORAL
Qty: 30 TABLET | Refills: 0 | Status: SHIPPED | OUTPATIENT
Start: 2025-03-19

## 2025-03-19 NOTE — TELEPHONE ENCOUNTER
3/19/25, last seen 11/13/24, next appointment 4/18/25, last filled 2/17/25- amt 30/0, checked the PDMP

## 2025-03-19 NOTE — TELEPHONE ENCOUNTER
Reason for call:   [x] Refill   [] Prior Auth  [] Other:     Office: [facility]  [x] PCP/Provider - Catrachita Leblanc   [] Specialty/Provider -     Medication: zolpidem     Dose/Frequency: 12.5 mg CR/ daily PRN     Quantity: 30 day supply     Pharmacy: [facility]   Does the patient have enough for 3 days?   [] Yes   [x] No - Send as HP to POD    Mail Away Pharmacy   Does the patient have enough for 10 days?   [] Yes   [] No - Send as HP to POD

## 2025-03-25 ENCOUNTER — TELEPHONE (OUTPATIENT)
Dept: NEUROLOGY | Facility: CLINIC | Age: 55
End: 2025-03-25

## 2025-03-25 NOTE — TELEPHONE ENCOUNTER
LMOM to confirm pt's appt w/ Eleanor in [place] on 4/4/25. Advised pt to arrive 15 minutes prior.

## 2025-03-26 ENCOUNTER — TELEPHONE (OUTPATIENT)
Dept: INTERNAL MEDICINE CLINIC | Age: 55
End: 2025-03-26

## 2025-04-04 ENCOUNTER — COSMETIC (OUTPATIENT)
Dept: PLASTIC SURGERY | Facility: CLINIC | Age: 55
End: 2025-04-04

## 2025-04-04 DIAGNOSIS — H02.834 DERMATOCHALASIS OF BOTH UPPER EYELIDS: Primary | ICD-10-CM

## 2025-04-04 DIAGNOSIS — H02.831 DERMATOCHALASIS OF BOTH UPPER EYELIDS: Primary | ICD-10-CM

## 2025-04-04 PROCEDURE — FILLER1 JUVEDERM/RESTYLANE 1 SYRINGE: Performed by: STUDENT IN AN ORGANIZED HEALTH CARE EDUCATION/TRAINING PROGRAM

## 2025-04-04 PROCEDURE — FILLEROTHER: Performed by: STUDENT IN AN ORGANIZED HEALTH CARE EDUCATION/TRAINING PROGRAM

## 2025-04-04 NOTE — PROGRESS NOTES
Filler Consult     First time?: no, had with other provider  Allergies: NKDA  Blood thinners: none   Pregnant: no  Neuromuscular conditions: cervical spine issues    Patient has had filler, interested in improvement of cheeks and NLFs     Will proceed with 1 syringe juvederm ultra     Risks and benefits discussed, patient agreed to proceed.     1 syringe of juvederm ultra split between NLF (0.25 cc per side) with malar regions (0.25 cc per side)    1 syringe of juvederm ultra, 10% off    Referred for visual field study. Instructed to f/u with us after completion.    Patient tolerated well      Parth Breaux MD   [facility]   [address], [address]   [place] [address]   Office: [phone]

## 2025-04-14 ENCOUNTER — TELEPHONE (OUTPATIENT)
Age: 55
End: 2025-04-14

## 2025-04-14 NOTE — TELEPHONE ENCOUNTER
Patient called stating that the referral we gave her for Ophthalmology the Dr's name listed sees patients only two days a week and those two days patient can't do.  She's asking if we can put in a referral for another Dr.    I provided patient with  Dr Wyatt Machuca as we were advised by office before.  And she would like for someone to call her and advise if she needs a referral to someone else.    Thank you

## 2025-04-15 NOTE — TELEPHONE ENCOUNTER
[facility] for patient to inform her that new referral is placed to Dr. Wyatt Machuca. Provided phone number to call and schedule.     Wyatt Machuca, OD  Phone: [phone]  Fax: [phone]   [facility]  [place] [address]

## 2025-04-17 DIAGNOSIS — G47.00 INSOMNIA, UNSPECIFIED TYPE: ICD-10-CM

## 2025-04-17 NOTE — TELEPHONE ENCOUNTER
Reason for call:   [x] Refill   [] Prior Auth  [] Other:     Office:   [x] PCP/Provider -THOMAS Mcmanus    [] Specialty/Provider -     Medication: zolpidem (AMBIEN CR) 12.5 MG CR tablet     Dose/Frequency:  Take 1 tablet (12.5 mg total) by mouth daily at bedtime as needed for sleep     Quantity: 30    Pharmacy: [id number] - [place], [place] - [address] [facility]   Does the patient have enough for 3 days?   [x] Yes   [] No - Send as HP to POD    Mail Away Pharmacy   Does the patient have enough for 10 days?   [] Yes   [] No - Send as HP to POD

## 2025-04-18 RX ORDER — ZOLPIDEM TARTRATE 12.5 MG/1
12.5 TABLET, FILM COATED, EXTENDED RELEASE ORAL
Qty: 30 TABLET | Refills: 0 | Status: SHIPPED | OUTPATIENT
Start: 2025-04-18

## 2025-04-22 ENCOUNTER — OFFICE VISIT (OUTPATIENT)
Age: 55
End: 2025-04-22
Payer: COMMERCIAL

## 2025-04-22 VITALS
DIASTOLIC BLOOD PRESSURE: 74 MMHG | SYSTOLIC BLOOD PRESSURE: 110 MMHG | HEART RATE: 72 BPM | OXYGEN SATURATION: 97 % | HEIGHT: 66 IN | BODY MASS INDEX: 29.32 KG/M2 | TEMPERATURE: 98.7 F | WEIGHT: 182.4 LBS

## 2025-04-22 DIAGNOSIS — Z13.228 SCREENING FOR METABOLIC DISORDER: ICD-10-CM

## 2025-04-22 DIAGNOSIS — E03.9 HYPOTHYROIDISM, UNSPECIFIED TYPE: ICD-10-CM

## 2025-04-22 DIAGNOSIS — M51.369 DEGENERATION OF INTERVERTEBRAL DISC OF LUMBAR REGION, UNSPECIFIED WHETHER PAIN PRESENT: ICD-10-CM

## 2025-04-22 DIAGNOSIS — M47.816 SPONDYLOSIS OF LUMBAR SPINE: ICD-10-CM

## 2025-04-22 DIAGNOSIS — Z12.11 ENCOUNTER FOR COLORECTAL CANCER SCREENING: Primary | ICD-10-CM

## 2025-04-22 DIAGNOSIS — E78.49 OTHER HYPERLIPIDEMIA: ICD-10-CM

## 2025-04-22 DIAGNOSIS — E04.1 THYROID NODULE: ICD-10-CM

## 2025-04-22 DIAGNOSIS — M25.562 ACUTE PAIN OF LEFT KNEE: ICD-10-CM

## 2025-04-22 DIAGNOSIS — R73.03 PRE-DIABETES: ICD-10-CM

## 2025-04-22 DIAGNOSIS — R73.01 ELEVATED FASTING GLUCOSE: ICD-10-CM

## 2025-04-22 DIAGNOSIS — F33.0 MILD EPISODE OF RECURRENT MAJOR DEPRESSIVE DISORDER (HCC): ICD-10-CM

## 2025-04-22 DIAGNOSIS — M54.2 NECK PAIN: ICD-10-CM

## 2025-04-22 DIAGNOSIS — R79.89 ABNORMAL TSH: ICD-10-CM

## 2025-04-22 DIAGNOSIS — Z12.12 ENCOUNTER FOR COLORECTAL CANCER SCREENING: Primary | ICD-10-CM

## 2025-04-22 DIAGNOSIS — F51.01 PRIMARY INSOMNIA: ICD-10-CM

## 2025-04-22 DIAGNOSIS — R00.2 PALPITATIONS: ICD-10-CM

## 2025-04-22 DIAGNOSIS — F32.2 CURRENT SEVERE EPISODE OF MAJOR DEPRESSIVE DISORDER WITHOUT PSYCHOTIC FEATURES, UNSPECIFIED WHETHER RECURRENT (HCC): ICD-10-CM

## 2025-04-22 DIAGNOSIS — E78.5 HYPERLIPIDEMIA, UNSPECIFIED HYPERLIPIDEMIA TYPE: ICD-10-CM

## 2025-04-22 PROCEDURE — 99214 OFFICE O/P EST MOD 30 MIN: CPT | Performed by: NURSE PRACTITIONER

## 2025-04-22 NOTE — ASSESSMENT & PLAN NOTE
"Patient feeling as though occasional \"heart skips a beat\".  She denies chest pain, shortness of breath, dizziness, lightheadedness associated with the symptoms  Notes the symptoms spontaneously resolved  Patient unsure how frequently the symptoms do occur  She has labs in the chart which I recommend she complete  EKG reviewed from 10/29/24  Holter monitor ordered          "

## 2025-04-22 NOTE — ASSESSMENT & PLAN NOTE
-continue to follow with surgical oncology   -biopsied Aug 2023  -[place] III nodule GCS benign   -TSH normal

## 2025-04-22 NOTE — ASSESSMENT & PLAN NOTE
Recommend OTC red yeast rice. Advised to increase fiber in diet. Recommend healthy lifestyle choices for your cholesterol.  Low fat/low cholesterol diet.  Limit/avoid red meat.  Eat more lean meat - chicken breast, ground turkey, fish.  Exercise 30 mins at least 5 times a week as tolerated.        SmartLink not supported outside of the Encounter Diagnoses SmartSection.

## 2025-04-22 NOTE — PROGRESS NOTES
Name: Catrachita Poole      : 1970      MRN: [id number]  Encounter Provider: THOMAS Mcmanus  Encounter Date: 2025   [facility]  :  Assessment & Plan  Encounter for colorectal cancer screening    Orders:    Ambulatory Referral to Gastroenterology; Future    Current severe episode of major depressive disorder without psychotic features, unspecified whether recurrent (HCC)           Screening for metabolic disorder    Orders:    Comprehensive metabolic panel    CBC and differential    Lipid panel    Hyperlipidemia, unspecified hyperlipidemia type    Orders:    Lipid panel    Elevated fasting glucose    Orders:    Hemoglobin A1C    Acute pain of left knee    Orders:    XR knee 4+ vw left injury; Future    Abnormal TSH    Orders:    TSH, 3rd generation with Free T4 reflex    Hypothyroidism, unspecified type    Orders:    TSH, 3rd generation with Free T4 reflex    Thyroid nodule  -continue to follow with surgical oncology   -biopsied Aug 2023  -[place] III nodule GCS benign   -TSH normal               Spondylosis of lumbar spine  -followed by  physiatry   -follows Dr Dewey Sam           Degeneration of intervertebral disc of lumbar region, unspecified whether pain present  -Continue to follow with pain management and neurosurgery              Mild episode of recurrent major depressive disorder (HCC)  -controlled off medication          [facility]  [facility]           Primary insomnia  -Patient currently on Ambien 12.5 mg daily at bedtime.             Pre-diabetes  HBA1C 5.9  Continue metformin  Patient is to continue to work on diet and exercise.  Limit sugars and carbohydrate intake.    Avoid soda, juice, sweets, cookies, desserts, pasta, bread.   Eat more whole grains, exercised 30 min of cardio at least 3 times a week.  Also recommended daily foot exams to check for sores, and recommended yearly eye exams.  "             Palpitations  Patient feeling as though occasional \"heart skips a beat\".  She denies chest pain, shortness of breath, dizziness, lightheadedness associated with the symptoms  Notes the symptoms spontaneously resolved  Patient unsure how frequently the symptoms do occur  She has labs in the chart which I recommend she complete  EKG reviewed from 10/29/24  Holter monitor ordered                 History of Present Illness   Patient presents today to follow-up on chronic conditions.    Anxiety- currently controlled off medication       Primary insomnia-controlled with Ambien, have tried different sleep aids in the past with no relief, has been waking up a night to use the bathroom and then she can not fall back to sleep     Hyperlipidemia-Cholesterol 248, , currently not on medication    Pre-diabetes- HBA1C 5.9, had seen weight management and was started on metformin, she has lost weight since starting this medication    Chronic lumbar back pain and cervical pain, currently following pain management and physiatry  Had spinal stimulator   Scheduled for surgery on 5/9    Left knee pain-denies injury, for about 2 weeks, she reports that she is not able to bend her knee                 Review of Systems   Constitutional:  Negative for activity change, appetite change, chills, diaphoresis and fever.   HENT:  Negative for congestion, ear discharge, ear pain, postnasal drip, rhinorrhea, sinus pressure, sinus pain and sore throat.    Eyes:  Negative for pain, discharge, itching and visual disturbance.   Respiratory:  Negative for cough, chest tightness, shortness of breath and wheezing.    Cardiovascular:  Negative for chest pain, palpitations and leg swelling.   Gastrointestinal:  Negative for abdominal pain, constipation, diarrhea, nausea and vomiting.   Endocrine: Negative for polydipsia, polyphagia and polyuria.   Genitourinary:  Negative for difficulty urinating, dysuria and urgency.   Musculoskeletal:  " "Positive for arthralgias. Negative for back pain and neck pain.   Skin:  Negative for rash and wound.   Neurological:  Negative for dizziness, weakness, numbness and headaches.       Objective   /74 (BP Location: Left arm, Patient Position: Sitting, Cuff Size: Standard)   Pulse 72   Temp 98.7 °F (37.1 °C) (Temporal)   Ht 5' 5.91\" (1.674 m)   Wt 82.7 kg (182 lb 6.4 oz)   SpO2 97%   BMI 29.52 kg/m²      Physical Exam  Constitutional:       General: She is not in acute distress.     Appearance: She is well-developed. She is not diaphoretic.   HENT:      Head: Normocephalic and atraumatic.      Right Ear: External ear normal.      Left Ear: External ear normal.      Nose: Nose normal.      Mouth/Throat:      Mouth: Mucous membranes are moist.      Pharynx: No oropharyngeal exudate or posterior oropharyngeal erythema.   Eyes:      General:         Right eye: No discharge.         Left eye: No discharge.      Conjunctiva/sclera: Conjunctivae normal.      Pupils: Pupils are equal, round, and reactive to light.   Neck:      Thyroid: No thyromegaly.   Cardiovascular:      Rate and Rhythm: Normal rate and regular rhythm.      Heart sounds: Normal heart sounds. No murmur heard.     No friction rub. No gallop.   Pulmonary:      Effort: Pulmonary effort is normal. No respiratory distress.      Breath sounds: Normal breath sounds. No stridor. No wheezing or rales.   Abdominal:      General: Bowel sounds are normal. There is no distension.      Palpations: Abdomen is soft.      Tenderness: There is no abdominal tenderness.   Musculoskeletal:      Cervical back: Normal range of motion and neck supple.   Lymphadenopathy:      Cervical: No cervical adenopathy.   Skin:     General: Skin is warm and dry.      Findings: No erythema or rash.   Neurological:      Mental Status: She is alert and oriented to person, place, and time.   Psychiatric:         Behavior: Behavior normal.         Thought Content: Thought content " normal.         Judgment: Judgment normal.

## 2025-04-24 ENCOUNTER — TELEPHONE (OUTPATIENT)
Age: 55
End: 2025-04-24

## 2025-04-24 LAB
BASOPHILS # BLD AUTO: 0 THOU/CMM (ref 0–0.1)
BASOPHILS NFR BLD AUTO: 1 %
DIFFERENTIAL METHOD BLD: ABNORMAL
EOSINOPHIL # BLD AUTO: 0.1 THOU/CMM (ref 0–0.5)
EOSINOPHIL NFR BLD AUTO: 3 %
ERYTHROCYTE [DISTWIDTH] IN BLOOD BY AUTOMATED COUNT: 13.6 % (ref 12–16)
HCT VFR BLD AUTO: 37.9 % (ref 35–43)
HGB BLD-MCNC: 12.7 G/DL (ref 11.5–14.5)
LYMPHOCYTES # BLD AUTO: 1.5 THOU/CMM (ref 1–3)
LYMPHOCYTES NFR BLD AUTO: 36 %
MCH RBC QN AUTO: 30.7 PG (ref 26–34)
MCHC RBC AUTO-ENTMCNC: 33.5 G/DL (ref 32–37)
MCV RBC AUTO: 92 FL (ref 80–100)
MONOCYTES # BLD AUTO: 0.3 THOU/CMM (ref 0.3–1)
MONOCYTES NFR BLD AUTO: 7 %
NEUTROPHILS # BLD AUTO: 2.3 THOU/CMM (ref 1.8–7.8)
NEUTROPHILS NFR BLD AUTO: 53 %
PLATELET # BLD AUTO: 292 THOU/CMM (ref 140–350)
PMV BLD REES-ECKER: 7.3 FL (ref 7.5–11.3)
RBC # BLD AUTO: 4.13 MILL/CMM (ref 3.7–4.7)
WBC # BLD AUTO: 4.3 THOU/CMM (ref 4–10)

## 2025-04-24 NOTE — TELEPHONE ENCOUNTER
Patient was unfortunately turned away after attempting to complete her XR order given during recent visit, to assist patient we are calling the occupational medicine office to see if an X-Ray tech is available today.   Per staff at occupational medicine office they do have an x-ray tech today and tomorrow hours are 8-4. Patient was made aware and given the phone number to the office to verify prior to going if she needed.

## 2025-04-25 ENCOUNTER — APPOINTMENT (OUTPATIENT)
Age: 55
End: 2025-04-25
Attending: NURSE PRACTITIONER
Payer: COMMERCIAL

## 2025-04-25 DIAGNOSIS — M25.562 ACUTE PAIN OF LEFT KNEE: ICD-10-CM

## 2025-04-25 PROCEDURE — 73564 X-RAY EXAM KNEE 4 OR MORE: CPT

## 2025-05-01 ENCOUNTER — TELEPHONE (OUTPATIENT)
Age: 55
End: 2025-05-01

## 2025-05-01 NOTE — TELEPHONE ENCOUNTER
05/01/25  Screened by: Jacque Rai    Referring Provider      Pre- Screening:     There is no height or weight on file to calculate BMI.  BMI 29.52  Has patient been referred for a routine screening Colonoscopy? YES  Is the patient between 45-75 years old? yes      Previous Colonoscopy no    Does the patient want to see a Gastroenterologist prior to their procedure OR are they having any GI symptoms? no    Has the patient been hospitalized or had abdominal surgery in the past 6 months? no    Does the patient use supplemental oxygen? no    Does the patient take Coumadin, Lovenox, Plavix, Elliquis, Xarelto, or other blood thinning medication? no    Has the patient had a stroke, cardiac event, or stent placed in the past year? no      If patient is between 45yrs - 49yrs, please advise patient that we will have to confirm benefits & coverage with their insurance company for a routine screening colonoscopy.

## 2025-05-01 NOTE — LETTER
Danny Domínguez,    Attached are your instructions for your upcoming procedure on 6/5/25. If you have any questions, please give us a call at [phone].    Thank you,    [facility], Colon & Rectal Surgery!         COLONOSCOPY  MIRALAX/Dulcolax Bowel Preparation Instructions    The OR/GI Lab will contact you the evening prior to your procedure with your exact arrival time.    Our practice requires a 1 week notice for any cancellations or rescheduling. We kindly ask that you immediately notify us of any changes including any new medications that are prescribed. Thank you for your cooperation.     WEEK BEFORE YOUR PROCEDURE:  Stop taking Iron tablets.  5 days prior, AVOID vegetables and fruits with skins or seeds, nuts, corn, popcorn and whole grain breads.   Purchase: One (1) 238-gram container of Miralax (polyethylene glycol 3350), four (4) 5 mg Dulcolax (bisacodyl) tablets, and one (1) 64-ounce bottle of Gatorade (sports drink) - no red, orange, or purple. These may be purchased at any pharmacy without a prescription. Generic products are permissible.   Arrange responsible transportation for day of the procedure.     DAY BEFORE THE PROCEDURE:   CLEAR liquids only for entire day prior. Nothing red, orange or purple.    You MAY have:                                                               Soda  Water  Broth Gatorade  Jello  Popsicles Coffee/tea without milk/creamer     YOU MAY NOT HAVE:  Solid foods   Milk and milk products    Juice with pulp    BOWEL PREPARATION:  Includes: One (1) 238-gram container of Miralax (polyethylene glycol 3350), four (4) 5 mg Dulcolax (bisacodyl) tablets, and one (1) 64-ounce bottle of Gatorade (sports drink).  Preparation may be refrigerated.  Entire bowel prep should be completed.     Afternoon before the procedure (2:00 pm - 5:00 pm):    Take two (2) 5 mg Dulcolax laxative tablets.     Evening before the procedure (6:00 pm):  Mix entire container of Miralax  with one (1) 64-ounce bottle of Gatorade and shake until all medication is dissolved.   Begin drinking solution. Drink an eight (8) ounce cup every 10-15 minutes until you have consumed half (32 ounces) of the solution.  Refrigerate remaining solution.    Night before the procedure (8:00 pm):  Take two (2) 5 mg Dulcolax laxative tablets.     Beginning 5 hours before your procedure:  Drink the remaining amount of prepared solution (32 ounces).  Drink an eight (8) ounce cup every 10-15 minutes until you have consumed the remaining solution.     Bowel prep should be completed 4 hours prior to procedure time.    NOTHING TO EAT OR DRINK AFTER MIDNIGHT- EXCEPT FOR YOUR PREP    DAY OF THE PROCEDURE:  You may brush your teeth.  Leave all jewelry at home.  Please arrive for your procedure as indicated by the OR / GI Lab / Endoscopy Unit. The hospital will contact you the day before with your exact arrival time.   Make sure you have arranged ahead of time for a responsible adult (18 or older) to accompany and drive you home after the procedure.  Please discuss any transportation concerns with our staff prior to your procedure.    The effects of the anesthesia can persist for 24 hours.  After receiving the sedation, you must exercise caution before engaging in any activity that could harm yourself and others (such as driving a car).  Do not make any important decisions or do not drink any alcoholic beverages during this time period.  After your procedure, you may have anything you'd like to eat or drink.  You will probably want to start with something light.  Please include plenty of fluids.  Avoid items that cause gas such as sodas and salads.    SPECIAL INSTRUCTIONS:    For patients currently taking blood thinners and/or antiplatelet therapy our office will contact the prescribing provider.  Our office will contact you with any required changes to your medication regimen.     Blood thinner (i.e. - Coumadin, Pradaxa, Lovenox,  Xarelto, Eliquis)  ?  Continue (Do Not Stop)  ? Stop______________for_____________days prior to the procedure.    Antiplatelet (i.e. - Plavix, Aggrenox, Effient, Brilinta)  ?  Continue (Do Not Stop)  ? Stop______________for_____________days prior to the procedure.       Diabetes:   If you are Diabetic, please see separate Diabetic Instruction Sheet.          Prescribed medications:  Do not stop your aspirin, or any of your other medications (unless instructed otherwise).    Take the rest of your prescribed medications with small sips of water at least 2 hours prior to your procedure.      For any questions or concerns related to your bowel preparation or pre-procedure instructions, please contact our office at [phone].  Thank you for choosing [facility]!

## 2025-05-01 NOTE — TELEPHONE ENCOUNTER
Scheduled date of colonoscopy (as of today):6/5/25  Physician performing colonoscopy:DR GAYLE  Location of colonoscopy:[facility]  Bowel prep reviewed with patient:WALT/DELVIS SENT TO [facility]  Instructions reviewed with patient by:SALOME  Clearances: NA    DIABETIC, METFORMIN

## 2025-05-02 ENCOUNTER — TELEPHONE (OUTPATIENT)
Age: 55
End: 2025-05-02

## 2025-05-02 ENCOUNTER — OFFICE VISIT (OUTPATIENT)
Age: 55
End: 2025-05-02
Payer: COMMERCIAL

## 2025-05-02 ENCOUNTER — OFFICE VISIT (OUTPATIENT)
Dept: OBGYN CLINIC | Facility: HOSPITAL | Age: 55
End: 2025-05-02
Payer: COMMERCIAL

## 2025-05-02 VITALS
DIASTOLIC BLOOD PRESSURE: 60 MMHG | WEIGHT: 183.4 LBS | OXYGEN SATURATION: 97 % | HEIGHT: 66 IN | HEART RATE: 92 BPM | TEMPERATURE: 99 F | SYSTOLIC BLOOD PRESSURE: 106 MMHG | BODY MASS INDEX: 29.47 KG/M2

## 2025-05-02 DIAGNOSIS — M54.2 NECK PAIN: ICD-10-CM

## 2025-05-02 DIAGNOSIS — E78.49 OTHER HYPERLIPIDEMIA: ICD-10-CM

## 2025-05-02 DIAGNOSIS — M25.562 ACUTE PAIN OF LEFT KNEE: ICD-10-CM

## 2025-05-02 DIAGNOSIS — M25.462 EFFUSION OF LEFT KNEE: ICD-10-CM

## 2025-05-02 DIAGNOSIS — Z01.818 PRE-OP EXAMINATION: Primary | ICD-10-CM

## 2025-05-02 DIAGNOSIS — F33.0 MILD EPISODE OF RECURRENT MAJOR DEPRESSIVE DISORDER (HCC): ICD-10-CM

## 2025-05-02 DIAGNOSIS — M17.12 PRIMARY OSTEOARTHRITIS OF LEFT KNEE: Primary | ICD-10-CM

## 2025-05-02 DIAGNOSIS — F51.01 PRIMARY INSOMNIA: ICD-10-CM

## 2025-05-02 DIAGNOSIS — Z01.818 PRE-OP EVALUATION: ICD-10-CM

## 2025-05-02 DIAGNOSIS — R73.03 PRE-DIABETES: ICD-10-CM

## 2025-05-02 DIAGNOSIS — M51.369 DEGENERATION OF INTERVERTEBRAL DISC OF LUMBAR REGION, UNSPECIFIED WHETHER PAIN PRESENT: ICD-10-CM

## 2025-05-02 DIAGNOSIS — E04.1 THYROID NODULE: ICD-10-CM

## 2025-05-02 PROCEDURE — 93000 ELECTROCARDIOGRAM COMPLETE: CPT | Performed by: NURSE PRACTITIONER

## 2025-05-02 PROCEDURE — 99214 OFFICE O/P EST MOD 30 MIN: CPT | Performed by: NURSE PRACTITIONER

## 2025-05-02 PROCEDURE — 99204 OFFICE O/P NEW MOD 45 MIN: CPT | Performed by: FAMILY MEDICINE

## 2025-05-02 NOTE — PROGRESS NOTES
Pre-operative Clearance  Name: Catrachita Poole      : 1970      MRN: [id number]  Encounter Provider: THOMAS Mcmanus  Encounter Date: 2025   Encounter department: [facility]    :  Assessment & Plan  Pre-op examination    Orders:    POCT ECG    Pre-op evaluation  -Reviewed EKG and Blood work   - Patient optimized for surgery  -Consult faxed to surgeon         Thyroid nodule  -continue to follow with surgical oncology   -biopsied Aug 2023  -Gaston III nodule GCS benign   -TSH normal                 Degeneration of intervertebral disc of lumbar region, unspecified whether pain present         Neck pain  Managed by [facility]             Mild episode of recurrent major depressive disorder (HCC)  -controlled off medication            Primary insomnia  -Patient currently on Ambien 12.5 mg daily at bedtime.               Pre-diabetes  HBA1C 5.9  Continue metformin  Patient is to continue to work on diet and exercise.  Limit sugars and carbohydrate intake.    Avoid soda, juice, sweets, cookies, desserts, pasta, bread.   Eat more whole grains, exercised 30 min of cardio at least 3 times a week.  Also recommended daily foot exams to check for sores, and recommended yearly eye exams.                    Pre-operative Clearance:     Revised Cardiac Risk Index:  RCI RISK CLASS I (0 risk factors, risk of major cardiac complications approximately 0.5%)    Medication Instructions:   - Avoid herbs or non-directed vitamins one week prior to surgery    - Avoid aspirin containing medications or non-steroidal anti-inflammatory drugs one week preceding surgery    - May take tylenol for pain up until the night before surgery    - Antiepileptic meds: Continue to take this medication on your normal schedule.  - Sleep meds (zolpidem, zaleplon, eszopiclone): Continue taking medication up to the evening before procedure/surgery, but do not take this medication on the  morning of procedure/surgery.      Medicine Instructions for Adults with Diabetes (NO Bowel Prep)    Follow these instructions when a BOWEL PREP is NOT required for your procedure or surgery!    NOTE:  GLP Agonists taken weekly: do not take in the 7 days before your procedure. **Bariatric surgery: do not take 4 weeks prior to your procedure.    SGLT-2 Inhibitors: do not take in the 4 days before your procedure    On the Day Before Surgery/Procedure  If you are having a procedure (e.g., Colonoscopy) or surgery which DOES NOT require a bowel prep, follow the directions below based on the type of medicine you take for your diabetes.  Type of Medicine You Take Examples What to Do   Pre-Mixed Insulin Intermediate  Bzwxhnk31/25, Gmrflbf80/30, Novolog 70/30, Regular Insulin Take 1/2 your regular dose the evening before our procedure.   Rapid/Fast Acting  Insulin and/or Long-Acting Insulin Humalog U200, NovoLog, Apidra,  Lantus, Levemir, Tresiba, Toujeo,  Fias, Basaglar Take your FULL regular dose the day before procedure.   Oral Diabetic Medicines (sulfonylurea) Glipizide/Glimepiride/  Glucotrol Take your regular dose with dinner the evening before your procedure.   Other Oral Diabetic Medicines Metformin, Glucophage, Glucophage  XR, Riomet, Glumetza, Actose,  Avandia, Gl set, Prandin Take your regular dose with dinner the evening before your procedure   GLP Agonists Adlyxin, Byetta, Bydureon,  Ozempic, Soliqua, Tanzeum,  Trulicity, Victoza, Saxenda,  Rybelsus, Wegovy, Mounjaro, Zepbound If taken daily, take as normal  If taken weekly, do not take this medicine for 7 days before your procedure including the day of the procedure (resume taking after the procedure). **Bariatric surgery: do not take 4 weeks prior to procedure   SGLT-2 Inhibitors Jardiance, Invokana, Farxiga, Steglatro, Brenzavvy, Qtern, Segluromet Glyxambi, Synjardy, Synjardy XR, Invokamet, InvokametXR, Trijary XR, Xigduo X Do not take for 4 days before your  procedure including the day of the procedure (resume taking after the procedure)   This educational material has been approved by the Patient Education Advisory Committee.    On the Day of Surgery/Procedure  Follow the directions below based on the type of medicine you take for your diabetes.  Type of Medicine You Take  Examples What to Do   Long-Acting Insulin Lantus, Levemir, Tresiba,  Toujeo, Basaglar, Semglee If you normally take your Long Acting Insulin in the morning, take the full dose as scheduled.   GLP-I Agonists Adlyxin, Byetta, Bydureon,  Ozempic, Soliqua, Tanzeum,  Trulicity, Victoza, Saxenda,  Rybelsus, Mounjaro Do NOT take this medicine on the day of your procedure (resume taking after the procedure)   Except for the morning Long-Acting Insulin, DO NOT take ANY diabetic medicine on the day of your procedure unless you were instructed by the doctor who manages your diabetes medicines.  Continue to check your blood sugars.  If you have an insulin pump, ask your endocrinologist for instructions at least 3 days before your procedure. NOTE: If you are not able to ask your endocrinologist in advance, on the day of the procedure set your insulin pump to your basal rate only. Bring your insulin pump supplies to the hospital.         History of Present Illness     Pre-Op Examination     Surgery: anterior cervical discectomy and fusion c4-5   Anticipated Date of Surgery: 5/9   Surgeon: Dr. Mcclain     Reviewed CMP and CBC unremarkable  Reviewed EKG     Previous history of bleeding disorders or clots?: No    Previous Anesthesia reaction?: No    Prolonged steroid use in the last 6 months?: No      Assessment of Cardiac Risk:   - Unstable or severe angina or MI in the last 6 weeks or history of stent placement in the last year?: No    - Decompensated heart failure (e.g. New onset heart failure, NYHA  Class IV heart failure, or worsening existing heart failure)?: No    - Significant arrhythmias such as high grade  AV block, symptomatic ventricular arrhythmia, newly recognized ventricular tachycardia, supraventricular tachycardia with resting heart rate >100, or symptomatic bradycardia?: No    - Severe heart valve disease including aortic stenosis or symptomatic mitral stenosis?: No      Pre-operative Risk Factors:  - Elevated-risk surgery: No    - History of cerebrovascular disease: No    - History of ischemic heart disease: No    - History of congestive heart failure: No    - Pre-operative treatment with insulin: No    - Pre-operative creatinine >2 mg/dL: No      [facility] Activity Status Index (DASI):    - DASI Total Score: 36.7   - METs: 7.3     Medications of Perioperative Concern:    NSAIDs and Metformin    Review of Systems   Constitutional:  Negative for activity change, appetite change, chills, diaphoresis and fever.   HENT:  Negative for congestion, ear discharge, ear pain, postnasal drip, rhinorrhea, sinus pressure, sinus pain and sore throat.    Eyes:  Negative for pain, discharge, itching and visual disturbance.   Respiratory:  Negative for cough, chest tightness, shortness of breath and wheezing.    Cardiovascular:  Negative for chest pain, palpitations and leg swelling.   Gastrointestinal:  Negative for abdominal pain, blood in stool, constipation, diarrhea, nausea and vomiting.   Endocrine: Negative for polydipsia, polyphagia and polyuria.   Genitourinary:  Negative for difficulty urinating, dysuria, hematuria and urgency.   Musculoskeletal:  Negative for arthralgias, back pain and neck pain.   Skin:  Negative for rash and wound.   Neurological:  Negative for dizziness, weakness, numbness and headaches.     Past Medical History   Past Medical History:   Diagnosis Date    Adjustment disorder     Anxiety     Diabetes mellitus (HCC)     History of cocaine use     History of tobacco use     HLD (hyperlipidemia)     Lipoma of shoulder     PONV (postoperative nausea and vomiting)     Sesamoiditis 3/30/2020    Severe  cervical dysplasia 2017    Tobacco dependence syndrome      Past Surgical History:   Procedure Laterality Date    BREAST SURGERY      Augment.     CERVICAL BIOPSY N/A 2017    Procedure: CONE BIOPSY;  Surgeon: Enrrique Harley MD;  Location: [facility];  Service: Gynecology    EXPLORATORY LAPAROTOMY      Age 18, ovarian cysts    IR BIOPSY ABDOMEN  2023    MAMMO (HISTORICAL)      NECK SURGERY      Last Assessed 2016    SCAPULAR MASS EXCISION Left     lipoma     Family History   Problem Relation Age of Onset    Other Mother         No Pertinent Family History    Other Father         No Pertinent Family History     Social History     Tobacco Use    Smoking status: Former     Types: Cigarettes     Start date: 2024     Quit date: 1986     Years since quittin.3     Passive exposure: Past    Smokeless tobacco: Never    Tobacco comments:     Smoked on and off   Vaping Use    Vaping status: Never Used   Substance and Sexual Activity    Alcohol use: Yes     Alcohol/week: 1.0 standard drink of alcohol     Types: 1 Glasses of wine per week     Comment: social    Drug use: No     Comment: hx of Cocaine use.    Sexual activity: Yes     Partners: Male     Birth control/protection: Post-menopausal     Current Outpatient Medications on File Prior to Visit   Medication Sig    diclofenac (VOLTAREN) 75 mg EC tablet Take 75 mg by mouth 2 (two) times a day    metFORMIN (GLUCOPHAGE) 500 mg tablet Take 1 tablet (500 mg total) by mouth 2 (two) times a day with meals    pregabalin (LYRICA) 75 mg capsule Take 75 mg by mouth Three times a day    zolpidem (AMBIEN CR) 12.5 MG CR tablet Take 1 tablet (12.5 mg total) by mouth daily at bedtime as needed for sleep    ibuprofen (MOTRIN) 800 mg tablet Take 800 mg by mouth as needed (Patient not taking: Reported on 10/15/2024)     Allergies   Allergen Reactions    Morphine GI Intolerance and Other (See Comments)     vomiting     Objective   /60 (BP Location: Left  "arm, Patient Position: Sitting, Cuff Size: Standard)   Pulse 92   Temp 99 °F (37.2 °C) (Temporal)   Ht 5' 5.91\" (1.674 m)   Wt 83.2 kg (183 lb 6.4 oz)   SpO2 97%   BMI 29.68 kg/m²     Physical Exam  Constitutional:       General: She is not in acute distress.     Appearance: She is well-developed. She is not diaphoretic.   HENT:      Head: Normocephalic and atraumatic.      Right Ear: External ear normal.      Left Ear: External ear normal.      Nose: Nose normal.      Mouth/Throat:      Mouth: Mucous membranes are moist.      Pharynx: No oropharyngeal exudate or posterior oropharyngeal erythema.   Eyes:      General:         Right eye: No discharge.         Left eye: No discharge.      Conjunctiva/sclera: Conjunctivae normal.      Pupils: Pupils are equal, round, and reactive to light.   Neck:      Thyroid: No thyromegaly.   Cardiovascular:      Rate and Rhythm: Normal rate and regular rhythm.      Heart sounds: Normal heart sounds. No murmur heard.     No friction rub. No gallop.   Pulmonary:      Effort: Pulmonary effort is normal. No respiratory distress.      Breath sounds: Normal breath sounds. No stridor. No wheezing or rales.   Abdominal:      General: Bowel sounds are normal. There is no distension.      Palpations: Abdomen is soft.      Tenderness: There is no abdominal tenderness.   Musculoskeletal:      Cervical back: Normal range of motion and neck supple.   Lymphadenopathy:      Cervical: No cervical adenopathy.   Skin:     General: Skin is warm and dry.      Findings: No erythema or rash.   Neurological:      Mental Status: She is alert and oriented to person, place, and time.   Psychiatric:         Behavior: Behavior normal.         Thought Content: Thought content normal.         Judgment: Judgment normal.           THOMAS Mcmanus  "

## 2025-05-02 NOTE — PROGRESS NOTES
Assessment:     Assessment & Plan  Acute pain of left knee    Orders:    Brace    Ambulatory Referral to Physical Therapy; Future    Lyme Total AB W Reflex to IGM/IGG; Future    Effusion of left knee    Orders:    Brace    Ambulatory Referral to Physical Therapy; Future    Lyme Total AB W Reflex to IGM/IGG; Future    Primary osteoarthritis of left knee    Orders:    Brace    Ambulatory Referral to Physical Therapy; Future    Lyme Total AB W Reflex to IGM/IGG; Future        Diagnosis and Orders:      1. Primary osteoarthritis of left knee  Brace    Ambulatory Referral to Physical Therapy    Lyme Total AB W Reflex to IGM/IGG    Lyme Total AB W Reflex to IGM/IGG      2. Acute pain of left knee  Brace    Ambulatory Referral to Physical Therapy    Lyme Total AB W Reflex to IGM/IGG    Lyme Total AB W Reflex to IGM/IGG      3. Effusion of left knee  Brace    Ambulatory Referral to Physical Therapy    Lyme Total AB W Reflex to IGM/IGG    Lyme Total AB W Reflex to IGM/IGG        Orders Placed This Encounter   Procedures    Brace    Lyme Total AB W Reflex to IGM/IGG    Lyme Total AB W Reflex to IGM/IGG    Ambulatory Referral to Physical Therapy        Impression:   Left knee pain likely secondary to acute exacerbation of chronic primary knee arthritis.      Conservative Management   We discussed different treatment options:  Reviewed patient messaging completed on 04/28/2025.  X-rays were discussed.  And referral to Ortho/sports medicine was made.  Reviewed CMP completed on 04/24/2025.  CMP within normal limits.  Reviewed A1c completed on 11/02/2024.  A1c was 5.6.  Reviewed epic medications.  Patient has previously trialed oral Voltaren.  Also history of Lyrica/pregabalin.  Currently patient is on metformin    Mild medial joint arthritis.  Joint effusion present.    Will complete Lyme study    Osteoarthritis treatment goal is to minimize pain and optimize function.  First-line management  Ice or Heat Therapy as needed 1-2  times daily for 10-20 minutes. As tolerated.   Over the counter Tylenol and/or NSAIDs  as needed based off your Past Medical Hx. Please follow product label for dosing and maximum limits.  If you have concerns about utilizing Tylenol/NSAIDs please discuss with your primary care physician.  Trial of over the counter Topical Analgesics such as Lidocaine cream or Voltaren Gel, as tolerated. If skin becomes irritated, discontinue use.   Please range joint through gentle range of motion, as tolerated.   Initiate Home Exercise Program for Stretching and Strengthening affected area.    Weight management may benefit lower extremity osteoarthritis.  Normal BMI 18 through 24.  May request referral to weight management or nutritionist/dietitian.  Initiate Formal Physical Therapy at any preferred location.     PT prescription provided.  Patient would benefit from aqua therapy.  Additional management  Optional treatment measures include the following  For hip/knee osteoarthritis: Medial  brace for patients with medial compartmental osteoarthritis.    Hinged knee brace provided.  For hip/knee osteoarthritis assistive ambulation devices: Cane, walker  Interarticular glucocorticoid steroid if needing short-term pain relief  Patient declined corticosteroid injection today.  May consider viscosupplementation and/or PRP injections  Reviewed  Referral to an orthopedic surgeon to discuss potential surgical management        Imaging   Reviewed prior xrays obtain by Primary Care Physician. These were reviewed in office with patient today.   04/25/2025 left knee x-ray: No acute osseous abnormality  Kellgren-Steve Classification     Present Grade Radiological findings   [] 0 No radiological findings   [] 1 Doubtful narrowing of joint space and possible osteophytic lipping   [x] 2 Definite osteophytes and possible narrowing of joint space   [] 3 Moderate multiple osteophytes, definitive narrowing of joint space, small pseudocystic  areas with sclerotic walls and possibly deformity of bone contour   [] 4 Large osteophytes, marked narrowing of joint space, severe sclerosis and definitive deformity of bone contour   **If more than 1 [x] is present patient is between grades         Procedure  No Injection performed today. May consider future corticosteroid injection depending on clinical exam/imaging.  [place] guided corticosteroid injection was discussed.  Patient has needle phobia.    Shared decision making, patient agreeable to plan.      Return for Follow up as needed or if symptoms do NOT improve.    HPI:   Catrachita Poole is a 55 y.o. female  who presents for evaluation of   Chief Complaint   Patient presents with    Left Knee - Pain         Onset/Mechanism: Left knee pain for approximately 3 weeks. .  Location: medial and lateral joint line.  Severity: Max severity: 8-9/10.  Pain described as: Ache, stiffness, sharp  Radiation: Does have history of left leg numbness.  With low back issues..  Provocative: Running, stairs, squatting, kneeling.  Associated symptoms: Intermittent swelling without erythema.    Denies any hx of fracture of affected limb.   Denies any surgical history of affected limb.      Summary of treatment to-date:   Diclofenac daily  Compression sleeve  Denies CSI   Denies PT    Following History Reviewed and Updated     Past Medical History:   Diagnosis Date    Adjustment disorder     Anxiety     Diabetes mellitus (HCC)     History of cocaine use     History of tobacco use     HLD (hyperlipidemia)     Lipoma of shoulder     PONV (postoperative nausea and vomiting)     Sesamoiditis 3/30/2020    Severe cervical dysplasia 6/16/2017    Tobacco dependence syndrome      Past Surgical History:   Procedure Laterality Date    BREAST SURGERY      Augment.     CERVICAL BIOPSY N/A 6/16/2017    Procedure: CONE BIOPSY;  Surgeon: Enrrique Harley MD;  Location: [facility];  Service: Gynecology    EXPLORATORY LAPAROTOMY      Age 18,  ovarian cysts    IR BIOPSY ABDOMEN  2023    MAMMO (HISTORICAL)      NECK SURGERY      Last Assessed 2016    SCAPULAR MASS EXCISION Left     lipoma     Family History   Problem Relation Age of Onset    Other Mother         No Pertinent Family History    Other Father         No Pertinent Family History       Social History     Substance and Sexual Activity   Alcohol Use Yes    Alcohol/week: 1.0 standard drink of alcohol    Types: 1 Glasses of wine per week    Comment: social     Social History     Substance and Sexual Activity   Drug Use No    Comment: hx of Cocaine use.     Social History     Tobacco Use   Smoking Status Former    Types: Cigarettes    Start date: 2024    Quit date: 1986    Years since quittin.3    Passive exposure: Past   Smokeless Tobacco Never   Tobacco Comments    Smoked on and off       Social Drivers of Health     Tobacco Use: Medium Risk (2025)    Patient History     Smoking Tobacco Use: Former     Smokeless Tobacco Use: Never     Passive Exposure: Past   Alcohol Use: Not on file   Financial Resource Strain: Low Risk  (2024)    Received from [facility]    Overall Financial Resource Strain (CARDIA)     Difficulty of Paying Living Expenses: Not very hard   Food Insecurity: Food Insecurity Present (2024)    Received from [facility]    Hunger Vital Sign     Worried About Running Out of Food in the Last Year: Sometimes true     Ran Out of Food in the Last Year: Never true   Transportation Needs: No Transportation Needs (2024)    Received from [facility]    PRAPARE - Transportation     Lack of Transportation (Medical): No     Lack of Transportation (Non-Medical): No   Physical Activity: Not on file   Stress: Not on file   Social Connections: Not on file   Intimate Partner Violence: Not At Risk (2024)    Received from [facility], [facility]    Humiliation, Afraid,  Rape, and Kick questionnaire     Fear of Current or Ex-Partner: No     Emotionally Abused: No     Physically Abused: No     Sexually Abused: No   Depression: At risk (2/21/2023)    PHQ-2     PHQ-2 Score: 3   Housing Stability: Low Risk  (6/25/2024)    Received from [facility]    Housing Stability Vital Sign     Unable to Pay for Housing in the Last Year: No     Number of Times Moved in the Last Year: 0     Homeless in the Last Year: No   Utilities: Not At Risk (6/25/2024)    Received from [facility]     Threatened with loss of utilities: No   Health Literacy: Not on file        Allergies   Allergen Reactions    Morphine GI Intolerance and Other (See Comments)     vomiting       Review of Systems      Review of Systems     Review of Systems   Constitutional: Negative for chills and fever.   HENT: Negative for drooling and sneezing.    Eyes: Negative for redness.   Respiratory: Negative for cough and wheezing.    Gastrointestinal: Negative for vomiting.   Psychiatric/Behavioral: Negative for behavioral problems. The patient is not nervous/anxious.      All other systems negative.   Physical Exam   Physical Exam    Vitals and nursing note reviewed.  Constitutional:   Appearance. Normal Appearance.  There were no vitals taken for this visit.    There is no height or weight on file to calculate BMI.   HENT:  Head: Atraumatic.  Nose: Nose normal  Eyes: Conjunctiva/sclera: Conjunctivae normal.  Cardiovascular:   Rate and Rhythm: Bilateral equal distal pulses  Pulmonary:   Effort: Pulmonary effort is normal  Skin:   General: Skin is warm and dry.  Neurological:   General: No focal deficit present.  Mental Status: Alert and oriented to person, place, and time.   Psychiatric:   Mood and Affect: mood normal.  Behavior: Behavior normal     Musculoskeletal Exam     Ortho Exam   Left Knee:       General :   alert and oriented, in no acute distress   Left Lower Extremity  Knee  "Effusion:  1+   Ecchymosis:  none   Knee ROM:  Gross intact   Patella:  Patella does track normally and symmetrical.  (observing the patella for smooth motion while the patient contracts the quadriceps muscle)     Inspection: No erythema, ecchymosis, gross deformity   Tenderness: medial joint line and lateral joint line   Strength: Grossly Intact Bilaterally    Stability:  Anterior drawer: not performed  Posterior drawer: not performed  Medial collateral ligament: negative  Lateral collateral ligament: negative         Sensation:   Grossly intact    Pulses: normal PT pulses        (Test not completed if space left blank )               Procedures       Portions of the record may have been created with voice recognition software. Occasional wrong word or \"sound alike\" substitutions may have occurred due to the inherent limitations of voice recognition software. Please review the chart carefully and recognize, using context, where substitutions/typographical errors may have occurred.   "

## 2025-05-02 NOTE — ASSESSMENT & PLAN NOTE
Orders:    Brace    Ambulatory Referral to Physical Therapy; Future    Lyme Total AB W Reflex to IGM/IGG; Future

## 2025-05-02 NOTE — ASSESSMENT & PLAN NOTE
-continue to follow with surgical oncology   -biopsied Aug 2023  -[facility] III nodule GCS benign   -TSH normal

## 2025-05-02 NOTE — TELEPHONE ENCOUNTER
FAXED THE OFFICE VISIT NOTE 05/2/2025 AND EKG TO DR [facility] AT [facility].  [phone]

## 2025-05-07 ENCOUNTER — TELEPHONE (OUTPATIENT)
Age: 55
End: 2025-05-07

## 2025-05-07 NOTE — TELEPHONE ENCOUNTER
Janice called to get EKG faxed to her [phone] fax #.    There are notes from today 9:15am that it was faxed.

## 2025-05-07 NOTE — TELEPHONE ENCOUNTER
Caller: Patient    Doctor: Dr. Chuck Ramires    Reason for call: Verified 5/8 appt has been cancelled    Call back#: [phone]

## 2025-05-11 DIAGNOSIS — E66.811 OBESITY, CLASS I, BMI 30-34.9: ICD-10-CM

## 2025-05-13 ENCOUNTER — TELEPHONE (OUTPATIENT)
Dept: INTERNAL MEDICINE CLINIC | Facility: OTHER | Age: 55
End: 2025-05-13

## 2025-05-13 NOTE — TELEPHONE ENCOUNTER
LMOM for pt to call me at [facility] office    Please schedule TCM on or before 5/24    Please send me a secure chat message to see if I am available to take the call.

## 2025-05-19 DIAGNOSIS — G47.00 INSOMNIA, UNSPECIFIED TYPE: ICD-10-CM

## 2025-05-19 RX ORDER — ZOLPIDEM TARTRATE 12.5 MG/1
12.5 TABLET, FILM COATED, EXTENDED RELEASE ORAL
Qty: 30 TABLET | Refills: 0 | Status: SHIPPED | OUTPATIENT
Start: 2025-05-19

## 2025-05-22 ENCOUNTER — ANESTHESIA (OUTPATIENT)
Dept: ANESTHESIOLOGY | Facility: HOSPITAL | Age: 55
End: 2025-05-22

## 2025-05-22 ENCOUNTER — ANESTHESIA EVENT (OUTPATIENT)
Dept: ANESTHESIOLOGY | Facility: HOSPITAL | Age: 55
End: 2025-05-22

## 2025-06-03 ENCOUNTER — OFFICE VISIT (OUTPATIENT)
Dept: OBGYN CLINIC | Facility: CLINIC | Age: 55
End: 2025-06-03
Payer: COMMERCIAL

## 2025-06-03 VITALS — HEIGHT: 66 IN | BODY MASS INDEX: 29.41 KG/M2 | WEIGHT: 183 LBS

## 2025-06-03 DIAGNOSIS — M25.562 ACUTE PAIN OF LEFT KNEE: ICD-10-CM

## 2025-06-03 DIAGNOSIS — M25.462 EFFUSION OF LEFT KNEE: ICD-10-CM

## 2025-06-03 DIAGNOSIS — M17.12 PRIMARY OSTEOARTHRITIS OF LEFT KNEE: Primary | ICD-10-CM

## 2025-06-03 PROCEDURE — 20610 DRAIN/INJ JOINT/BURSA W/O US: CPT | Performed by: FAMILY MEDICINE

## 2025-06-03 RX ORDER — LIDOCAINE HYDROCHLORIDE 10 MG/ML
2 INJECTION, SOLUTION INFILTRATION; PERINEURAL
Status: COMPLETED | OUTPATIENT
Start: 2025-06-03 | End: 2025-06-03

## 2025-06-03 RX ORDER — TRIAMCINOLONE ACETONIDE 40 MG/ML
40 INJECTION, SUSPENSION INTRA-ARTICULAR; INTRAMUSCULAR
Status: COMPLETED | OUTPATIENT
Start: 2025-06-03 | End: 2025-06-03

## 2025-06-03 RX ADMIN — LIDOCAINE HYDROCHLORIDE 2 ML: 10 INJECTION, SOLUTION INFILTRATION; PERINEURAL at 10:00

## 2025-06-03 RX ADMIN — TRIAMCINOLONE ACETONIDE 40 MG: 40 INJECTION, SUSPENSION INTRA-ARTICULAR; INTRAMUSCULAR at 10:00

## 2025-06-03 NOTE — PATIENT INSTRUCTIONS
"Patient Education     Steroid injection   The Basics   Written by the doctors and editors at [facility]   What is a steroid injection? -- Steroids, also known as \"glucocorticoids,\" are medicines that help reduce swelling and pain. Doctors sometimes inject a steroid medicine into a joint or other part of the body to relieve pain. This is also sometimes called a \"cortisone shot.\"  After the injection, the steroid starts to work within a few days.  How long does a steroid injection work? -- It depends on the person and where the injection is given. For some people, the effects of a steroid injection can last for a few weeks or longer.  Sometimes, the doctor also injects a medicine called a \"local anesthetic\" with the steroid. This might help relieve pain until the steroid starts to work.  A steroid injection can help with pain, but it won't cure the problem that is causing the pain.  How do I prepare for a steroid injection? -- The doctor or nurse will tell you if you need to do anything special to prepare. Before your procedure, your doctor will do an exam.  Your doctor will also ask you about your \"health history.\" This involves asking you questions about any health problems you have or had in the past, past surgeries, and any medicines you take. Tell them about:   Any medicines you are taking - This includes any prescription or \"over-the-counter\" medicines you use, plus any herbal supplements you take. It helps to write down and bring a list of any medicines you take, or bring a bag with all of your medicines with you.   Any allergies you have   Any bleeding problems you have   Any other steroid injections you have had  Ask the doctor or nurse if you have questions or if there is anything you do not understand.  How is a steroid injection given? -- When it is time for the injection:   The doctor will clean the skin over the area where they plan to give the shot. (This is called the \"injection site.\")   They might use " ultrasound or a special kind of X-ray during the procedure. This is to check where to give the steroid.   Sometimes, they might give a shot of medicine to numb the skin.   They will inject the steroid.   Then, they will take out the needle and cover the injection site with a bandage.  What happens after a steroid injection? -- You can go home after the injection.  For the next few days, you might want to:   Apply a cold gel pack, bag of ice, or bag of frozen vegetables to the injection site every 1 to 2 hours, for 15 minutes each time. Put a thin towel between the ice (or other cold object) and your skin.   Rest the treated body part for a few days.   Take medicines to relieve pain. Examples include acetaminophen (sample brand name: Tylenol), ibuprofen (sample brand names: Advil, Motrin), or naproxen (sample brand name: Aleve).  If you have diabetes, the doctor might want you to check your blood sugar levels more often for a few days. The steroid medicine might temporarily raise your blood sugar.  What are the risks of a steroid injection? -- Your doctor will talk to you about all of the possible risks, and answer your questions. Possible risks include:   Bleeding, bruising, or soreness at the injection site   Damage to parts near the injection site - This might include cartilage damage, injury to nerves, tendon rupture, or thinning of skin and bones.   Skin around the injection site becoming lighter or whiter in color   Infection   Health conditions like diabetes or high blood pressure getting worse for a few days   Allergic reaction to the medicine  What else should I know? -- Most of the time, doctors limit the total number of steroid injections to a certain area.  A steroid injection might be only 1 part of your treatment plan. Take your other medicines as instructed. Also, follow your doctor's recommendations about other treatments. These might include things like physical therapy or devices like a brace or  cane.  All topics are updated as new evidence becomes available and our peer review process is complete.  This topic retrieved from [employer] on: Apr 25, 2024.  Topic 646086 Version 2.0  Release: 32.4.2 - C32.114  © 2024 [employer] and/or its affiliates. All rights reserved.  Consumer Information Use and Disclaimer   Disclaimer: This generalized information is a limited summary of diagnosis, treatment, and/or medication information. It is not meant to be comprehensive and should be used as a tool to help the user understand and/or assess potential diagnostic and treatment options. It does NOT include all information about conditions, treatments, medications, side effects, or risks that may apply to a specific patient. It is not intended to be medical advice or a substitute for the medical advice, diagnosis, or treatment of a health care provider based on the health care provider's examination and assessment of a patient's specific and unique circumstances. Patients must speak with a health care provider for complete information about their health, medical questions, and treatment options, including any risks or benefits regarding use of medications. This information does not endorse any treatments or medications as safe, effective, or approved for treating a specific patient. [employer] and its affiliates disclaim any warranty or liability relating to this information or the use thereof.The use of this information is governed by the Terms of Use, available at https://www.[employer]uwer.com/en/know/clinical-effectiveness-terms. 2024© [employer]Date, Inc. and its affiliates and/or licensors. All rights reserved.  Copyright   © 2024 [employer]Date, Inc. and/or its affiliates. All rights reserved.

## 2025-06-03 NOTE — PROGRESS NOTES
Assessment:   Assessment & Plan        Diagnosis and Orders:      1. Primary osteoarthritis of left knee        2. Acute pain of left knee        3. Effusion of left knee          Orders Placed This Encounter   Procedures    Large joint arthrocentesis        Impression:   Left knee pain likely secondary to acute exacerbation of chronic primary knee arthritis.       Conservative Management   We discussed different treatment options:  Previously reviewed/discussed on 05/02/2025  Reviewed patient messaging completed on 04/28/2025.  X-rays were discussed.  And referral to Ortho/sports medicine was made.  Reviewed CMP completed on 04/24/2025.  CMP within normal limits.  Reviewed A1c completed on 11/02/2024.  A1c was 5.6.  Reviewed epic medications.  Patient has previously trialed oral Voltaren.  Also history of Lyrica/pregabalin.  Currently patient is on metformin  Pending Lyme  Today's discussion/review  Patient would like to attempt a corticosteroid injection.  Due to needle phobia will not complete aspiration at this time.  Patient tolerated well see below for more detail     Osteoarthritis treatment goal is to minimize pain and optimize function.  First-line management  Ice or Heat Therapy as needed 1-2 times daily for 10-20 minutes. As tolerated.   Over the counter Tylenol and/or NSAIDs  as needed based off your Past Medical Hx. Please follow product label for dosing and maximum limits.  If you have concerns about utilizing Tylenol/NSAIDs please discuss with your primary care physician.  Trial of over the counter Topical Analgesics such as Lidocaine cream or Voltaren Gel, as tolerated. If skin becomes irritated, discontinue use.   Please range joint through gentle range of motion, as tolerated.   Initiate Home Exercise Program for Stretching and Strengthening affected area.    Weight management may benefit lower extremity osteoarthritis.  Normal BMI 18 through 24.  May request referral to weight management or  nutritionist/dietitian.  Initiate Formal Physical Therapy at any preferred location.                                 PT prescription provided.  Patient would benefit from aqua therapy.  Additional management  Optional treatment measures include the following  For hip/knee osteoarthritis: Medial  brace for patients with medial compartmental osteoarthritis.                                Hinged knee brace previously provided.  For hip/knee osteoarthritis assistive ambulation devices: Cane, walker  Interarticular glucocorticoid steroid if needing short-term pain relief  Declined aspiration due to needle phobia.  Will complete landmark guided corticosteroid injection.  Patient accepted and tolerated well.  See below for more detail  May consider viscosupplementation and/or PRP injections  Referral to an orthopedic surgeon to discuss potential surgical management           Imaging   Previously Reviewed prior xrays obtain by Primary Care Physician. These were reviewed in office with patient today.   04/25/2025 left knee x-ray: No acute osseous abnormality  Kellgren-Steve Classification      Present Grade Radiological findings   []  0 No radiological findings   []  1 Doubtful narrowing of joint space and possible osteophytic lipping   [x]  2 Definite osteophytes and possible narrowing of joint space   []  3 Moderate multiple osteophytes, definitive narrowing of joint space, small pseudocystic areas with sclerotic walls and possibly deformity of bone contour   []  4 Large osteophytes, marked narrowing of joint space, severe sclerosis and definitive deformity of bone contour   **If more than 1 [x] is present patient is between grades        Procedure  A corticosteroid injection was performed in the office today. Please see below for procedure details.   The risks and benefits of the injection (which include but are not limited to: infection, bleeding, damage to nerve/artery, need for further intervention), as well  as the risks and benefits of all alternative treatments were explained and understood.  The patient elected to proceed with injection. The procedure was done with aseptic technique, and the patient tolerated the procedure well with no complications.  The patient should take 1-2 days off of activity, with gradual return to activity as tolerated.  No Submerging joint for 5 -7 days. May take showers as usual. Please avoid hot-tubs, bath soaks, Jacuzzi, ocean/lake swimming for recommended amount of time (5-7 days).   May Ice injection site 1-2 times daily for 20 minutes, for next 24-48 hrs.    Shared decision making, patient agreeable to plan.      Return for Follow up as needed or if symptoms do NOT improve.    HPI:     Catrachita Poole is a 55 y.o. female  who presents for evaluation of   Chief Complaint   Patient presents with    Left Knee - Follow-up, Swelling       Severity: Current severity: 8/10.  Pain described as: Swelling still present    Musculoskeletal Exam     Ortho Exam   Left knee :       General :   alert and oriented, in no acute distress   Gait: Antalgic. The patient can bear weight on the injured extremity.   Left Extremity  Inspection: No gross deformity, erythema, warmth   Tenderness: medial joint line and lateral joint line   Strength: Grossly Intact Bilaterally    Sensation:   intact to light touch   Skin: Warm and dry        (Test not completed if space left blank )               Large joint arthrocentesis: L knee    Performed by: Jessi Sanchez DO  Authorized by: Jessi Sanchez DO    Universal Protocol:  Consent: Verbal consent obtained  Risks and benefits: risks, benefits and alternatives were discussed  Consent given by: patient  Patient understanding: patient states understanding of the procedure being performed  Patient consent: the patient's understanding of the procedure matches consent given  Site marked: the operative site was marked  Radiology Images  "displayed and confirmed. If images not available, report reviewed: imaging studies available  Required items: required blood products, implants, devices, and special equipment available  Patient identity confirmed: verbally with patient  Supporting Documentation  Indications: pain     Is this a Visco injection? NoProcedure Details  Location: knee - L knee  Preparation: Patient was prepped and draped in the usual sterile fashion  Needle size: 22 G  Ultrasound guidance: no  Approach: anterolateral (Inferior lateral patella)  Medications administered: 2 mL lidocaine 1 %; 40 mg triamcinolone acetonide 40 mg/mL    Patient tolerance: patient tolerated the procedure well with no immediate complications  Dressing:  Sterile dressing applied             Portions of the record may have been created with voice recognition software. Occasional wrong word or \"sound alike\" substitutions may have occurred due to the inherent limitations of voice recognition software. Please review the chart carefully and recognize, using context, where substitutions/typographical errors may have occurred.   "

## 2025-06-05 ENCOUNTER — ANESTHESIA (OUTPATIENT)
Dept: GASTROENTEROLOGY | Facility: AMBULARY SURGERY CENTER | Age: 55
End: 2025-06-05
Payer: COMMERCIAL

## 2025-06-05 ENCOUNTER — HOSPITAL ENCOUNTER (OUTPATIENT)
Dept: GASTROENTEROLOGY | Facility: AMBULARY SURGERY CENTER | Age: 55
Setting detail: OUTPATIENT SURGERY
End: 2025-06-05
Attending: INTERNAL MEDICINE
Payer: COMMERCIAL

## 2025-06-05 VITALS
BODY MASS INDEX: 26.84 KG/M2 | SYSTOLIC BLOOD PRESSURE: 114 MMHG | DIASTOLIC BLOOD PRESSURE: 60 MMHG | TEMPERATURE: 97.4 F | HEIGHT: 66 IN | OXYGEN SATURATION: 100 % | WEIGHT: 167 LBS | RESPIRATION RATE: 16 BRPM | HEART RATE: 80 BPM

## 2025-06-05 DIAGNOSIS — Z12.11 SCREENING FOR COLON CANCER: ICD-10-CM

## 2025-06-05 PROCEDURE — 88305 TISSUE EXAM BY PATHOLOGIST: CPT | Performed by: PATHOLOGY

## 2025-06-05 PROCEDURE — 45385 COLONOSCOPY W/LESION REMOVAL: CPT | Performed by: INTERNAL MEDICINE

## 2025-06-05 RX ORDER — PROPOFOL 10 MG/ML
INJECTION, EMULSION INTRAVENOUS CONTINUOUS PRN
Status: DISCONTINUED | OUTPATIENT
Start: 2025-06-05 | End: 2025-06-05

## 2025-06-05 RX ORDER — LIDOCAINE HCL/PF 100 MG/5ML
SYRINGE (ML) INJECTION AS NEEDED
Status: DISCONTINUED | OUTPATIENT
Start: 2025-06-05 | End: 2025-06-05

## 2025-06-05 RX ORDER — PROPOFOL 10 MG/ML
INJECTION, EMULSION INTRAVENOUS AS NEEDED
Status: DISCONTINUED | OUTPATIENT
Start: 2025-06-05 | End: 2025-06-05

## 2025-06-05 RX ORDER — SODIUM CHLORIDE, SODIUM LACTATE, POTASSIUM CHLORIDE, CALCIUM CHLORIDE 600; 310; 30; 20 MG/100ML; MG/100ML; MG/100ML; MG/100ML
INJECTION, SOLUTION INTRAVENOUS CONTINUOUS PRN
Status: DISCONTINUED | OUTPATIENT
Start: 2025-06-05 | End: 2025-06-05

## 2025-06-05 RX ADMIN — SODIUM CHLORIDE, SODIUM LACTATE, POTASSIUM CHLORIDE, AND CALCIUM CHLORIDE: .6; .31; .03; .02 INJECTION, SOLUTION INTRAVENOUS at 07:32

## 2025-06-05 RX ADMIN — PROPOFOL 100 MG: 10 INJECTION, EMULSION INTRAVENOUS at 07:36

## 2025-06-05 RX ADMIN — LIDOCAINE HYDROCHLORIDE 100 MG: 20 INJECTION INTRAVENOUS at 07:36

## 2025-06-05 RX ADMIN — PROPOFOL 150 MCG/KG/MIN: 10 INJECTION, EMULSION INTRAVENOUS at 07:36

## 2025-06-05 NOTE — ANESTHESIA PREPROCEDURE EVALUATION
Procedure:  COLONOSCOPY    Relevant Problems   CARDIO   (+) Other hyperlipidemia      MUSCULOSKELETAL   (+) Acute bilateral low back pain without sciatica   (+) Primary osteoarthritis of left knee      NEURO/PSYCH   (+) Anxiety   (+) Claustrophobia   (+) Mild episode of recurrent major depressive disorder (HCC)      Behavioral Health   (+) Cigarette nicotine dependence without complication        Physical Exam    Airway     Mallampati score: II  TM Distance: >3 FB  Neck ROM: full      Cardiovascular      Dental       Pulmonary      Neurological    She appears awake, alert and oriented x3.      Other Findings  post-pubertal.      Anesthesia Plan  ASA Score- 2     Anesthesia Type- IV sedation with anesthesia with ASA Monitors.         Additional Monitors:     Airway Plan:     Comment: Recent labs personally reviewed:  Lab Results       Component                Value               Date                       WBC                      4.3                 04/24/2025                 HGB                      12.7                04/24/2025                 PLT                      292                 04/24/2025            Lab Results       Component                Value               Date                       K                        4.2                 05/10/2025                 BUN                      12                  05/10/2025                 CREATININE               0.88                05/10/2025            Lab Results       Component                Value               Date                       PTT                      30                  06/16/2017             Lab Results       Component                Value               Date                       INR                      0.9                 04/24/2025              Blood type A    Patient was consented for sedation with IV anesthetic. Discussed that we will maintain spontaneous respirations and utilize supplemental O2. I discussed the risks of aspiration, hypoxia,  laryngospasm and bronchospasm. I discussed the scenarios related to conversion to general anesthetic. All questions answered.     I, Luisa Ann MD, have personally seen and evaluated the patient prior to anesthetic care.  I have reviewed the pre-anesthetic record, medical history, allergies, medications and any other medical records if appropriate to the anesthetic care.  If a CRNA is involved in the case, I have reviewed the CRNA assessment, if present, and agree. Patient consented for IV Sedation, general anesthesia as back up. Discussed risks of aspiration, IV infiltration, indications for conversion to general anesthesia. All questions and concerns addressed.   .       Plan Factors-Exercise tolerance (METS): >4 METS.    Chart reviewed. EKG reviewed. Imaging results reviewed. Existing labs reviewed. Patient summary reviewed.    Patient is not a current smoker.  Patient did not smoke on day of surgery.    Obstructive sleep apnea risk education given perioperatively.        Induction- intravenous.    Postoperative Plan- .   Monitoring Plan - Monitoring plan - standard ASA monitoring          Informed Consent- Anesthetic plan and risks discussed with patient.  I personally reviewed this patient with the CRNA. Discussed and agreed on the Anesthesia Plan with the CRNA..      NPO Status:  Vitals Value Taken Time   Date of last liquid 06/05/25 06/05/25 06:48   Time of last liquid 0230 06/05/25 06:48   Date of last solid 06/03/25 06/05/25 06:48   Time of last solid 1900 06/05/25 06:48

## 2025-06-05 NOTE — H&P
"History and Physical -  [facility]  Catrachita Poole 55 y.o. female MRN: [id number]    HPI: Catrachita Poole is a 55 y.o. year old female who presents for screening colonoscopy      Review of Systems    Historical Information   Past Medical History[1]  Past Surgical History[2]  Social History   Social History     Substance and Sexual Activity   Alcohol Use Yes    Alcohol/week: 1.0 standard drink of alcohol    Types: 1 Glasses of wine per week    Comment: social     Social History     Substance and Sexual Activity   Drug Use No    Comment: hx of Cocaine use.     Tobacco Use History[3]  Family History[4]    Meds/Allergies     Not in a hospital admission.    Allergies[5]    Objective     /74   Pulse 85   Temp 97.8 °F (36.6 °C) (Temporal)   Resp 16   Ht 5' 6\" (1.676 m)   Wt 75.8 kg (167 lb)   SpO2 99%   BMI 26.95 kg/m²       PHYSICAL EXAM    Gen: NAD  CV: RRR  CHEST: Clear  ABD: soft, NT/ND  EXT: no edema  Neuro: AAO      ASSESSMENT/PLAN:  This is a 55 y.o. year old female here for colonoscopy    PLAN:   Procedure: Screening colonoscopy           [1]   Past Medical History:  Diagnosis Date    Adjustment disorder     Anxiety     Arthritis     Left knee    Diabetes mellitus (HCC)     History of cocaine use     History of tobacco use     HLD (hyperlipidemia)     Lipoma of shoulder     PONV (postoperative nausea and vomiting)     Sesamoiditis 03/30/2020    Severe cervical dysplasia 06/16/2017    Tobacco dependence syndrome    [2]   Past Surgical History:  Procedure Laterality Date    BREAST SURGERY      Augment.     CERVICAL BIOPSY N/A 06/16/2017    Procedure: CONE BIOPSY;  Surgeon: Enrrique Harley MD;  Location: [facility];  Service: Gynecology    CERVICAL FUSION  2025    May 9 2025    EXPLORATORY LAPAROTOMY      Age 18, ovarian cysts    IR BIOPSY ABDOMEN  08/24/2023    MAMMO (HISTORICAL)      NECK SURGERY      Last Assessed 7/14/2016    SCAPULAR MASS EXCISION Left     lipoma   [3] "   Social History  Tobacco Use   Smoking Status Former    Types: Cigarettes    Start date: 2024    Quit date: 1986    Years since quittin.4    Passive exposure: Past   Smokeless Tobacco Never   Tobacco Comments    Smoked on and off   [4]   Family History  Problem Relation Name Age of Onset    Other Mother          No Pertinent Family History    Other Father          No Pertinent Family History   [5]   Allergies  Allergen Reactions    Morphine GI Intolerance and Other (See Comments)     vomiting

## 2025-06-05 NOTE — ANESTHESIA POSTPROCEDURE EVALUATION
Post-Op Assessment Note    CV Status:  Stable  Pain Score: 0    Pain management: adequate       Mental Status:  Alert and awake   Hydration Status:  Euvolemic   PONV Controlled:  Controlled   Airway Patency:  Patent     Post Op Vitals Reviewed: Yes    No anethesia notable event occurred.    Staff: CRNA           Last Filed PACU Vitals:  Vitals Value Taken Time   Temp 98.0    Pulse 73    /62    Resp 21    SpO2 100

## 2025-06-09 PROCEDURE — 88305 TISSUE EXAM BY PATHOLOGIST: CPT | Performed by: PATHOLOGY

## 2025-06-12 ENCOUNTER — RESULTS FOLLOW-UP (OUTPATIENT)
Age: 55
End: 2025-06-12

## 2025-06-16 DIAGNOSIS — E66.811 OBESITY, CLASS I, BMI 30-34.9: ICD-10-CM

## 2025-06-17 DIAGNOSIS — G47.00 INSOMNIA, UNSPECIFIED TYPE: ICD-10-CM

## 2025-06-17 RX ORDER — ZOLPIDEM TARTRATE 12.5 MG/1
12.5 TABLET, FILM COATED, EXTENDED RELEASE ORAL
Qty: 30 TABLET | Refills: 0 | Status: SHIPPED | OUTPATIENT
Start: 2025-06-17

## 2025-06-17 NOTE — TELEPHONE ENCOUNTER
Medication: zolpidem (AMBIEN CR) 12.5 MG CR tablet     Dose/Frequency: 1 tablet daily at bedtime     Quantity: 30    Pharmacy: [facility] - [place], [place] - [address]    Office:   [x] PCP/Provider -   [] Speciality/Provider -     Does the patient have enough for 3 days?   [] Yes   [x] No - Send as HP to POD

## 2025-06-18 ENCOUNTER — TELEPHONE (OUTPATIENT)
Age: 55
End: 2025-06-18

## 2025-06-18 DIAGNOSIS — M17.12 PRIMARY OSTEOARTHRITIS OF LEFT KNEE: Primary | ICD-10-CM

## 2025-06-18 DIAGNOSIS — M25.562 ACUTE PAIN OF LEFT KNEE: ICD-10-CM

## 2025-06-18 DIAGNOSIS — M25.462 EFFUSION OF LEFT KNEE: ICD-10-CM

## 2025-06-18 DIAGNOSIS — R52 PAIN: ICD-10-CM

## 2025-06-18 DIAGNOSIS — G47.00 INSOMNIA, UNSPECIFIED TYPE: ICD-10-CM

## 2025-06-18 RX ORDER — ZOLPIDEM TARTRATE 12.5 MG/1
12.5 TABLET, FILM COATED, EXTENDED RELEASE ORAL
Qty: 30 TABLET | Refills: 0 | Status: CANCELLED | OUTPATIENT
Start: 2025-06-18

## 2025-06-18 NOTE — TELEPHONE ENCOUNTER
Patient call to schedule the appointment but she like to do a virtual one. Please let the patient know if she can do the virtual visit. Thank you

## 2025-06-18 NOTE — TELEPHONE ENCOUNTER
Patient called stating that she did the vision field test several weeks ago and Dr Machuca advised her we will have the results and call her.    Ia sked if she confirmed with the other doctor if it was sent/she advised she doesn't need to do that because they told her they will.    Can you please look into this and call her back.

## 2025-06-18 NOTE — TELEPHONE ENCOUNTER
Medication:     zolpidem (AMBIEN CR)        Dose/Frequency: 12.5 mg    Quantity: 30 tablet     Pharmacy: [facility] - [place], [place] - [address]      Office:   [x] PCP/Provider -   [] Speciality/Provider -     Does the patient have enough for 3 days?   [x] Yes   [] No - Send as HP to POD

## 2025-06-19 NOTE — TELEPHONE ENCOUNTER
Called and informed patient that we have not received anything. Asked that patient call and have them fax the report. Patient verbalized understanding.

## 2025-06-23 ENCOUNTER — OFFICE VISIT (OUTPATIENT)
Age: 55
End: 2025-06-23
Payer: COMMERCIAL

## 2025-06-23 VITALS
SYSTOLIC BLOOD PRESSURE: 108 MMHG | TEMPERATURE: 98.5 F | HEIGHT: 66 IN | HEART RATE: 98 BPM | DIASTOLIC BLOOD PRESSURE: 68 MMHG | WEIGHT: 175 LBS | OXYGEN SATURATION: 99 % | BODY MASS INDEX: 28.12 KG/M2

## 2025-06-23 DIAGNOSIS — M54.50 LUMBAR BACK PAIN: Primary | ICD-10-CM

## 2025-06-23 DIAGNOSIS — M54.2 CERVICAL PAIN (NECK): ICD-10-CM

## 2025-06-23 DIAGNOSIS — M51.369 DEGENERATION OF INTERVERTEBRAL DISC OF LUMBAR REGION, UNSPECIFIED WHETHER PAIN PRESENT: ICD-10-CM

## 2025-06-23 DIAGNOSIS — M25.562 ACUTE PAIN OF LEFT KNEE: ICD-10-CM

## 2025-06-23 PROCEDURE — 99213 OFFICE O/P EST LOW 20 MIN: CPT | Performed by: NURSE PRACTITIONER

## 2025-06-23 RX ORDER — GABAPENTIN 100 MG/1
100 CAPSULE ORAL 3 TIMES DAILY
Qty: 90 CAPSULE | Refills: 0 | Status: SHIPPED | OUTPATIENT
Start: 2025-06-23

## 2025-06-23 RX ORDER — METHYLPREDNISOLONE 4 MG/1
TABLET ORAL
Qty: 21 EACH | Refills: 0 | Status: SHIPPED | OUTPATIENT
Start: 2025-06-23

## 2025-06-23 NOTE — ASSESSMENT & PLAN NOTE
- Continue to follow with spine and pain management  -Recommend physical therapy  -Start Medrol Dosepak  -Start gabapentin 3 times a day  -Encouraged gentle stretching  -Ice or heat for discomfort

## 2025-06-23 NOTE — PROGRESS NOTES
Name: Catrachita Poole      : 1970      MRN: [id number]  Encounter Provider: THOMAS Mcmanus  Encounter Date: 2025   Encounter department: [facility]  :  Assessment & Plan  Lumbar back pain    Orders:    methylPREDNISolone 4 MG tablet therapy pack; Use as directed on package    gabapentin (NEURONTIN) 100 mg capsule; Take 1 capsule (100 mg total) by mouth 3 (three) times a day    Ambulatory Referral to Physical Therapy; Future    Acute pain of left knee    Orders:    Ambulatory Referral to Physical Therapy; Future    Cervical pain (neck)    Orders:    Ambulatory Referral to Physical Therapy; Future    Degeneration of intervertebral disc of lumbar region, unspecified whether pain present  - Continue to follow with spine and pain management  -Recommend physical therapy  -Start Medrol Dosepak  -Start gabapentin 3 times a day  -Encouraged gentle stretching  -Ice or heat for discomfort                History of Present Illness   Patient presents today with lumbar back pain.  May 9th cervical neck fusions     Has lower back ablation on        Has lower back pain for about 2 weeks, this is chronic, denies new injury, follows spine and pain management   Hurts with ADLS  Has been taking tylenol and ibuprofen with minimal relief   Has done PT in the past   Denies numbness and tingling     MRI    Impression:   1. Degenerative disc disease worse at the level of L3-L4 with central disc   protrusion, bilateral facet arthropathy and likely encroachment on the left   greater than right L4 nerve root in the subarticular zone and disc bulge and   facet arthropathy with moderate right and severe left foraminal narrowing at   L4-L5.   2. Severe foraminal narrowing bilaterally L5-S1.   3.  Disc bulge with annular fissure at the level of T12-L1.         Review of Systems   Constitutional:  Negative for activity change, appetite change, chills, diaphoresis and  "fever.   HENT:  Negative for congestion, ear discharge, ear pain, postnasal drip, rhinorrhea, sinus pressure, sinus pain and sore throat.    Eyes:  Negative for pain, discharge, itching and visual disturbance.   Respiratory:  Negative for cough, chest tightness, shortness of breath and wheezing.    Cardiovascular:  Negative for chest pain, palpitations and leg swelling.   Gastrointestinal:  Negative for abdominal pain, constipation, diarrhea, nausea and vomiting.   Endocrine: Negative for polydipsia, polyphagia and polyuria.   Genitourinary:  Negative for difficulty urinating, dysuria and urgency.   Musculoskeletal:  Positive for arthralgias, back pain and neck pain.   Skin:  Negative for rash and wound.   Neurological:  Negative for dizziness, weakness, numbness and headaches.       Objective   /68 (BP Location: Left arm, Patient Position: Sitting, Cuff Size: Standard)   Pulse 98   Temp 98.5 °F (36.9 °C) (Temporal)   Ht 5' 5.55\" (1.665 m)   Wt 79.4 kg (175 lb)   SpO2 99%   BMI 28.63 kg/m²      Physical Exam  Constitutional:       General: She is not in acute distress.     Appearance: She is well-developed. She is not diaphoretic.   HENT:      Head: Normocephalic and atraumatic.      Right Ear: External ear normal.      Left Ear: External ear normal.      Nose: Nose normal.      Mouth/Throat:      Mouth: Mucous membranes are moist.      Pharynx: No oropharyngeal exudate or posterior oropharyngeal erythema.     Eyes:      General:         Right eye: No discharge.         Left eye: No discharge.      Conjunctiva/sclera: Conjunctivae normal.      Pupils: Pupils are equal, round, and reactive to light.     Neck:      Thyroid: No thyromegaly.     Cardiovascular:      Rate and Rhythm: Normal rate and regular rhythm.      Heart sounds: Normal heart sounds. No murmur heard.     No friction rub. No gallop.   Pulmonary:      Effort: Pulmonary effort is normal. No respiratory distress.      Breath sounds: Normal " breath sounds. No stridor. No wheezing or rales.   Abdominal:      General: Bowel sounds are normal. There is no distension.      Palpations: Abdomen is soft.      Tenderness: There is no abdominal tenderness.     Musculoskeletal:      Cervical back: Normal range of motion and neck supple.   Lymphadenopathy:      Cervical: No cervical adenopathy.     Skin:     General: Skin is warm and dry.      Findings: No erythema or rash.     Neurological:      Mental Status: She is alert and oriented to person, place, and time.     Psychiatric:         Behavior: Behavior normal.         Thought Content: Thought content normal.         Judgment: Judgment normal.

## 2025-06-25 NOTE — TELEPHONE ENCOUNTER
Rec'd another call from pt requesting an update. She is a bit upset as she had gotten the vision field test done quite some time ago and has yet to hear anything. She had called Dr. Machuca's office a couple time already requesting that they fax over the results and was told each time that they would. Pt is wondering if someone from clinical would be able to contact his office to get process moving along.     Clinical,  Any way someone can contact Dr. Machuca's office to request test results again? Please advise and/or contact pt. Thank you.

## 2025-06-26 ENCOUNTER — EVALUATION (OUTPATIENT)
Dept: PHYSICAL THERAPY | Facility: REHABILITATION | Age: 55
End: 2025-06-26
Attending: NURSE PRACTITIONER
Payer: COMMERCIAL

## 2025-06-26 DIAGNOSIS — M54.2 CERVICAL PAIN (NECK): ICD-10-CM

## 2025-06-26 DIAGNOSIS — M54.50 LUMBAR BACK PAIN: ICD-10-CM

## 2025-06-26 DIAGNOSIS — M25.562 ACUTE PAIN OF LEFT KNEE: ICD-10-CM

## 2025-06-26 PROCEDURE — 97161 PT EVAL LOW COMPLEX 20 MIN: CPT

## 2025-06-26 PROCEDURE — 97112 NEUROMUSCULAR REEDUCATION: CPT

## 2025-06-26 PROCEDURE — 97110 THERAPEUTIC EXERCISES: CPT

## 2025-06-26 NOTE — TELEPHONE ENCOUNTER
Called patient per Tacho, Cosmetic Coordinator, that she is not eligible for insurance based upper bleph, informed patient that Tacho would send her a cosmetic surgical quote. Patient declined quote at this time and stated that she would wait and repeat the VFS when condition worsened.

## 2025-06-26 NOTE — PROGRESS NOTES
PT Evaluation     Today's date: 2025  Patient name: Catrachita Poole  : 1970  MRN: [id number]  Referring provider: Catrachita Leblanc CR*  Dx:   Encounter Diagnosis     ICD-10-CM    1. Lumbar back pain  M54.50 Ambulatory Referral to Physical Therapy      2. Acute pain of left knee  M25.562 Ambulatory Referral to Physical Therapy      3. Cervical pain (neck)  M54.2 Ambulatory Referral to Physical Therapy          Start Time: 1035  Stop Time: 1135  Total time in clinic (min): 60 minutes    Assessment  Impairments: abnormal coordination, abnormal gait, abnormal muscle firing, abnormal muscle tone, abnormal or restricted ROM, activity intolerance, impaired balance, impaired physical strength, lacks appropriate home exercise program, pain with function, weight-bearing intolerance, unable to perform ADL, participation limitations, activity limitations and endurance  Symptom irritability: moderate    Assessment details: Patient pleasantly completed the PT evaluation today very well. Patient presents to the clinic today with CC of L knee, LB and cervical pain. Findings of the evaluation include impaired lumbar and cervical ROM, decreased postural endurance, and pain with functional movements and positions. Patient expresses that she is compliant with HEP and is eager to be given more exercises to offer some relief. Patient was provided with lumopelvic, postural, and BLE strengthening and endurance exercises. Patient was also educated on proper lifting techniques to recruit more hip and core musculature, proper ergonomic layout regarding neck and low back posture, and the importance of mobility during work days.  The patient was educated on the evaluation findings and the POC. HEP was provided and demonstrated. Patient responded well to exercises and interventions performed during the session. Patient noted no increase in symptoms. This patient is a great candidate for physical therapy to address the  impairments, decrease pain, improve functional independence and quality of life.    Understanding of Dx/Px/POC: good     Prognosis: good    Goals  ST-6 weeks  Patient FOTO score will increase to 60 to improve independence with ADLs (knee)  Patient FOTO score will increase to 63 to improve independence with ADLs (lumbar)  Patient reports feeling 25% better since starting therapy to improve QOL  Patient will be able to return to work full time with no more than a 3/10 pain to improve QOL  Patient will report being compliant with HEP to improve prognosis.     LTG: 10-12 weeks  Patient FOTO score will increase to 70 or more  to improve independence with ADLs (KNEE)  Patient FOTO score will increase to 68 or more  to improve independence with ADLs (lumbar)  Patient reports feeling 50% better since starting therapy to improve QOL  Patient will be able to perform ADLs and recreational activities with no more than a 5/10 pain to improve QOL.   Patient will report being compliant with an advanced HEP to improve prognosis.      Plan  Patient would benefit from: skilled physical therapy  Planned modality interventions: low level laser therapy, manual electrical stimulation, biofeedback, cryotherapy, hydrotherapy, ultrasound, traction and TENS    Planned therapy interventions: IASTM, joint mobilization, kinesiology taping, manual therapy, massage, Herbert taping, muscle pump exercises, nerve gliding, neuromuscular re-education, patient/caregiver education, postural training, strengthening, stretching, therapeutic activities, therapeutic exercise, therapeutic training, transfer training, functional ROM exercises, flexibility, coordination, body mechanics training, balance/weight bearing training, balance, ADL training, activity modification and abdominal trunk stabilization    Frequency: 1-2x week  Duration in weeks: 8  Plan of Care beginning date: 2025  Plan of Care expiration date: 2025  Treatment plan  discussed with: patient    Subjective Evaluation    History of Present Illness  Mechanism of injury: Patient presents to the eval today with neck, knee and LBP. Patient notes that she has spinal slippage in her low back and is suspecting she will need surgery soon. A month ago the patient received a cervical fusion so she notes tightness in her cervical spine. Patient did receive an injection in her knee 3 weeks ago and noticed a slight decrease in pain. 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 Patient states that she has been to PT before so she is looking for strengthening exercises for her low back and core.   Quality of life: good    Patient Goals  Patient goals for therapy: decreased pain, increased motion, return to work and independence with ADLs/IADLs    Treatments  Previous treatment: injection treatment and physical therapy    Objective    Lumbar  Flexion: touched toes   Extension: painful and limited   Lateral Flexion L: WFL  Lateral Flexion R: WFL  R Rotation: WFL  L Rotation: limited     Observation: standing anterior pelvic tilt    Lower Extremity Strength: perform at NV  MMT Right Left   Hip flexion      Hip extension     Hip Abduction     Knee Flexion     Knee Extension     Ankle Dorsiflexion     Ankle Plantarflexion        Dynamic Balance Tests      5x sit to stand (sec)  >14 sec indicates high risk for falls NV   TUG (sec)  >14 sec indicates high risk for falls NV      Neck AROM  Flexion:  limited   Extension:  limited   Left lateral flexion: limited     Right lateral flexion:  limited   Left rotation: limited   Right rotation:   limited     Shoulder AROM:  L flexion WFL   L abduction WFL   R flexion WFL   R abduction  WFL     General Comments: posture and ergonomic education          Precautions: DM    POC expires Unit limit Auth Expiration date PT/OT + Visit Limit?   8/14/25 bomn  60pcy         Visit/Unit Tracking  AUTH Status:  Date 6/26/2025       "  callie Used 1         Remaining             Pertinent Findings:      Test / Measure  6/26/2025   FOTO (Predicted 70) knee 53   FOTO lumbar (predicted 68) 56           Access Code: [email or web address]  URL: [email or web address]  Date: 06/26/2025  Prepared by: Angela Yusuf    Exercises  - Supine Posterior Pelvic Tilt  - 1 x daily - 7 x weekly - 3 sets - 10 reps  - Supine Bridge  - 1 x daily - 7 x weekly - 3 sets - 10 reps  - Bridge with Heels on Swiss Ball  - 1 x daily - 7 x weekly - 3 sets - 10 reps  - Supine Active Straight Leg Raise  - 1 x daily - 7 x weekly - 3 sets - 10 reps  - Standing Hip Flexion AROM  - 1 x daily - 7 x weekly - 3 sets - 10 reps  - Seated Pelvic Tilt  - 1 x daily - 7 x weekly - 3 sets - 10 reps  - Standing Posterior Pelvic Tilt  - 1 x daily - 7 x weekly - 3 sets - 10 reps  - Standing Shoulder Row with Anchored Resistance  - 1 x daily - 7 x weekly - 3 sets - 10 reps  - Standing Shoulder Single Arm PNF D2 Extension with Anchored Resistance  - 1 x daily - 7 x weekly - 3 sets - 10 reps  - Supine March with Alternating Leg Lifts  - 1 x daily - 7 x weekly - 3 sets - 10 reps    Manuals 6/26                                                                Neuro Re-Ed             HEP/POC education  SW            Lifting mechanics  SW            Ergonomic education  SW            Postural education  SW            Pelvic tilts  Sitting, standing& supine x5 3\"                                       Ther Ex             Paloff  6# x5 each             D1 extension   6# x10 each             SLR Supine and standing             Bridge X10             Hams bridge  X10             Rows  X10 10#            DKTC              NS 5' lvl 6             LSU             LAQ             Heel slides              Leg press              STS             Ther Activity                                       Gait Training                                       Modalities                                            "

## 2025-06-27 ENCOUNTER — OFFICE VISIT (OUTPATIENT)
Dept: PHYSICAL THERAPY | Facility: REHABILITATION | Age: 55
End: 2025-06-27
Attending: NURSE PRACTITIONER
Payer: COMMERCIAL

## 2025-06-27 DIAGNOSIS — M54.2 CERVICAL PAIN (NECK): ICD-10-CM

## 2025-06-27 DIAGNOSIS — M54.50 LUMBAR BACK PAIN: Primary | ICD-10-CM

## 2025-06-27 DIAGNOSIS — M25.562 ACUTE PAIN OF LEFT KNEE: ICD-10-CM

## 2025-06-27 PROCEDURE — 97010 HOT OR COLD PACKS THERAPY: CPT

## 2025-06-27 PROCEDURE — 97110 THERAPEUTIC EXERCISES: CPT

## 2025-06-27 NOTE — PROGRESS NOTES
Daily Note     Today's date: 2025  Patient name: Catrachita Poole  : 1970  MRN: [id number]  Referring provider: Catrachita Leblanc CR*  Dx:   Encounter Diagnosis     ICD-10-CM    1. Lumbar back pain  M54.50       2. Acute pain of left knee  M25.562       3. Cervical pain (neck)  M54.2           Start Time: 0837  Stop Time: 09  Total time in clinic (min): 45 minutes    Subjective: Patients states that she was sore last night after.       Objective: See treatment diary below      Assessment: Tolerated treatment well. Patient exhibited good technique with therapeutic exercises and would benefit from continued PT. Patient noted sx relief during manuals. No adverse effects noted post manuals. Patient seen 1:1 with PT from 837-900 portion of session.         Plan: Continue per plan of care.      Precautions: DM, hx of cervical fusion     POC expires Unit limit Auth Expiration date PT/OT + Visit Limit?   25 bomn  60pcy         Visit/Unit Tracking  AUTH Status:  Date 2025        callie Used 1 2        Remaining             Pertinent Findings:      Test / Measure  2025   FOTO (Predicted 70) knee 53   FOTO lumbar (predicted 68) 56           Access Code: [email or web address]  URL: [email or web address]  Date: 2025  Prepared by: Angela Yusuf    Exercises  - Supine Posterior Pelvic Tilt  - 1 x daily - 7 x weekly - 3 sets - 10 reps  - Supine Bridge  - 1 x daily - 7 x weekly - 3 sets - 10 reps  - Bridge with Heels on Swiss Ball  - 1 x daily - 7 x weekly - 3 sets - 10 reps  - Supine Active Straight Leg Raise  - 1 x daily - 7 x weekly - 3 sets - 10 reps  - Standing Hip Flexion AROM  - 1 x daily - 7 x weekly - 3 sets - 10 reps  - Seated Pelvic Tilt  - 1 x daily - 7 x weekly - 3 sets - 10 reps  - Standing Posterior Pelvic Tilt  - 1 x daily - 7 x weekly - 3 sets - 10 reps  - Standing Shoulder Row with Anchored Resistance  - 1 x daily - 7 x weekly - 3 sets - 10 reps  - Standing Shoulder  "Single Arm PNF D2 Extension with Anchored Resistance  - 1 x daily - 7 x weekly - 3 sets - 10 reps  - Supine March with Alternating Leg Lifts  - 1 x daily - 7 x weekly - 3 sets - 10 reps    Manuals 6/26 6/27            BLE distraction   SW           PA LB mobs   Gr II                                        Neuro Re-Ed             HEP/POC education  SW            Lifting mechanics  SW            Ergonomic education  SW            Postural education  SW            Pelvic tilts  Sitting, standing& supine x5 3\"  3\"   2x10                                      Ther Ex             Paloff  6# x5 each             D1 extension   6# x10 each             SLR Supine and standing             Bridge X10             Hams bridge  X10             Rows  X10 10#            DKTC   X10            NS 5' lvl 6  5' RB              LSU  4\" 3x5 each            LAQ  7.5#  2x10            SL hip abd  4# 2x10            Clamshells   SL BTB 2x10 each            Heel slides              Leg press   70# 2x10            STS  15# 2x10            Ther Activity                                       Gait Training                                       Modalities             Cold pack   Cervical 10'                              "

## 2025-07-01 ENCOUNTER — OFFICE VISIT (OUTPATIENT)
Dept: PHYSICAL THERAPY | Facility: REHABILITATION | Age: 55
End: 2025-07-01
Attending: NURSE PRACTITIONER
Payer: COMMERCIAL

## 2025-07-01 DIAGNOSIS — M54.50 LUMBAR BACK PAIN: Primary | ICD-10-CM

## 2025-07-01 DIAGNOSIS — M25.562 ACUTE PAIN OF LEFT KNEE: ICD-10-CM

## 2025-07-01 DIAGNOSIS — M54.2 CERVICAL PAIN (NECK): ICD-10-CM

## 2025-07-01 PROCEDURE — 97110 THERAPEUTIC EXERCISES: CPT

## 2025-07-01 PROCEDURE — 97140 MANUAL THERAPY 1/> REGIONS: CPT

## 2025-07-01 NOTE — PROGRESS NOTES
Daily Note     Today's date: 2025  Patient name: Catrachita Poole  : 1970  MRN: [id number]  Referring provider: Catrachita Leblanc CR*  Dx:   Encounter Diagnosis     ICD-10-CM    1. Lumbar back pain  M54.50       2. Cervical pain (neck)  M54.2       3. Acute pain of left knee  M25.562           Start Time: 1230  Stop Time: 1300  Total time in clinic (min): 30 minutes    Subjective: Patient states that she had a rough weekend with increased pain in the low back. She also states that she returned to work yesterday which is an adjustment for her after being off for a while.       Objective: See treatment diary below      Assessment: Tolerated treatment well. Patient exhibited good technique with therapeutic exercises and would benefit from continued PT. Patient noted relief during SOR and snag exercises. No report of adverse effects. Patient seen 1:1 with PT from 1231p-100p portion of session.         Plan: Continue per plan of care.      Precautions: DM, hx of cervical fusion     POC expires Unit limit Auth Expiration date PT/OT + Visit Limit?   25 bomn  60pcy         Visit/Unit Tracking  AUTH Status:  Date 2025      callie Used 1 2 3       Remaining             Pertinent Findings:      Test / Measure  2025   FOTO (Predicted 70) knee 53   FOTO lumbar (predicted 68) 56           Access Code: [email or web address]  URL: [email or web address]  Date: 2025  Prepared by: Angela Yusuf    Exercises  - Supine Posterior Pelvic Tilt  - 1 x daily - 7 x weekly - 3 sets - 10 reps  - Supine Bridge  - 1 x daily - 7 x weekly - 3 sets - 10 reps  - Bridge with Heels on Swiss Ball  - 1 x daily - 7 x weekly - 3 sets - 10 reps  - Supine Active Straight Leg Raise  - 1 x daily - 7 x weekly - 3 sets - 10 reps  - Standing Hip Flexion AROM  - 1 x daily - 7 x weekly - 3 sets - 10 reps  - Seated Pelvic Tilt  - 1 x daily - 7 x weekly - 3 sets - 10 reps  - Standing Posterior Pelvic Tilt  - 1 x daily -  "7 x weekly - 3 sets - 10 reps  - Standing Shoulder Row with Anchored Resistance  - 1 x daily - 7 x weekly - 3 sets - 10 reps  - Standing Shoulder Single Arm PNF D2 Extension with Anchored Resistance  - 1 x daily - 7 x weekly - 3 sets - 10 reps  - Supine March with Alternating Leg Lifts  - 1 x daily - 7 x weekly - 3 sets - 10 reps    Manuals 6/26 6/27 7/1          BLE distraction   SW           PA LB mobs   Gr II            SOR   SW                         Neuro Re-Ed             HEP/POC education  SW            Lifting mechanics  SW            Ergonomic education  SW            Postural education  SW            Pelvic tilts  Sitting, standing& supine x5 3\"  3\"   2x10            Snag    Cervical extension and rotation                        Ther Ex             Paloff  6# x5 each             D1 extension   6# x10 each             SLR Supine and standing             Bridge X10             Hams bridge  X10             Rows  X10 10#            DKTC   X10  X10           NS 5' lvl 6  5' RB    5' NS           LSU  4\" 3x5 each            LAQ  7.5#  2x10            SL hip abd  4# 2x10            Clamshells   SL BTB 2x10 each  Supine GTB 2x10           Heel slides              Leg press   70# 2x10            STS  15# 2x10            Ther Activity                                       Gait Training                                       Modalities             Cold pack   Cervical 10'  Cervical 10'                               "

## 2025-07-09 ENCOUNTER — OFFICE VISIT (OUTPATIENT)
Dept: PHYSICAL THERAPY | Facility: REHABILITATION | Age: 55
End: 2025-07-09
Attending: NURSE PRACTITIONER
Payer: COMMERCIAL

## 2025-07-09 DIAGNOSIS — M54.50 LUMBAR BACK PAIN: Primary | ICD-10-CM

## 2025-07-09 DIAGNOSIS — M25.562 ACUTE PAIN OF LEFT KNEE: ICD-10-CM

## 2025-07-09 DIAGNOSIS — M54.2 CERVICAL PAIN (NECK): ICD-10-CM

## 2025-07-09 PROCEDURE — 97140 MANUAL THERAPY 1/> REGIONS: CPT

## 2025-07-09 PROCEDURE — 97010 HOT OR COLD PACKS THERAPY: CPT

## 2025-07-09 PROCEDURE — 97110 THERAPEUTIC EXERCISES: CPT

## 2025-07-09 NOTE — PROGRESS NOTES
Daily Note     Today's date: 2025  Patient name: Catrachita Poole  : 1970  MRN: [id number]  Referring provider: Catrachita Leblanc CR*  Dx:   Encounter Diagnosis     ICD-10-CM    1. Lumbar back pain  M54.50       2. Cervical pain (neck)  M54.2       3. Acute pain of left knee  M25.562           Start Time: 1230  Stop Time: 1300  Total time in clinic (min): 30 minutes    Subjective: Patient states that her neck is feeling better since performing those stretches at home. Patient reports that her low back pain remains the same and pain increases with most activity.       Objective: See treatment diary below      Assessment: Tolerated treatment well. Patient exhibited good technique with therapeutic exercises and would benefit from continued PT. Patient responded well to SOR with no noted adverse effects. Patient tolerated lower level mobility well, but noted an increase in pain during hamstring focused bridges. Patient seen 1:1 with PTs for entirety of session.         Plan: Continue per plan of care.      Precautions: DM, hx of cervical fusion     POC expires Unit limit Auth Expiration date PT/OT + Visit Limit?   25 bomn  60pcy         Visit/Unit Tracking  AUTH Status:  Date 2025     callie Used 1 2 3 4      Remaining             Pertinent Findings:      Test / Measure  2025   FOTO (Predicted 70) knee 53   FOTO lumbar (predicted 68) 56           Access Code: [email or web address]  URL: [email or web address]  Date: 2025  Prepared by: Angela Yusuf    Exercises  - Supine Posterior Pelvic Tilt  - 1 x daily - 7 x weekly - 3 sets - 10 reps  - Supine Bridge  - 1 x daily - 7 x weekly - 3 sets - 10 reps  - Bridge with Heels on Swiss Ball  - 1 x daily - 7 x weekly - 3 sets - 10 reps  - Supine Active Straight Leg Raise  - 1 x daily - 7 x weekly - 3 sets - 10 reps  - Standing Hip Flexion AROM  - 1 x daily - 7 x weekly - 3 sets - 10 reps  - Seated Pelvic Tilt  - 1 x daily - 7 x  "weekly - 3 sets - 10 reps  - Standing Posterior Pelvic Tilt  - 1 x daily - 7 x weekly - 3 sets - 10 reps  - Standing Shoulder Row with Anchored Resistance  - 1 x daily - 7 x weekly - 3 sets - 10 reps  - Standing Shoulder Single Arm PNF D2 Extension with Anchored Resistance  - 1 x daily - 7 x weekly - 3 sets - 10 reps  - Supine March with Alternating Leg Lifts  - 1 x daily - 7 x weekly - 3 sets - 10 reps    Manuals 6/26 6/27 7/1 7/9          BLE distraction   SW           PA LB mobs   Gr II            SOR   [facility]                        Neuro Re-Ed             HEP/POC education              Lifting mechanics              Ergonomic education  [facility]         Postural education              LTR    X10 each          Pelvic tilts  Sitting, standing& supine x5 3\"  3\"   2x10   X10 3\"          Snag    Cervical extension and rotation                        Ther Ex             Paloff  6# x5 each             D1 extension   6# x10 each             SLR Supine and standing             Bridge X10             Hams bridge  X10    X10          Rows  X10 10#            DKTC   X10  X10  X10          NS 5' lvl 6  5' RB    5' NS  5' NS         LSU  4\" 3x5 each            LAQ  7.5#  2x10            SL hip abd  4# 2x10            Clamshells   SL BTB 2x10 each  Supine GTB 2x10           Heel slides              Leg press   70# 2x10            STS  15# 2x10            Ther Activity                                       Gait Training                                       Modalities             Cold pack   Cervical 10'  Cervical 10'  Cervical 10'                                "

## 2025-07-11 ENCOUNTER — OFFICE VISIT (OUTPATIENT)
Dept: PHYSICAL THERAPY | Facility: REHABILITATION | Age: 55
End: 2025-07-11
Attending: NURSE PRACTITIONER
Payer: COMMERCIAL

## 2025-07-11 DIAGNOSIS — M25.562 ACUTE PAIN OF LEFT KNEE: ICD-10-CM

## 2025-07-11 DIAGNOSIS — M54.50 LUMBAR BACK PAIN: Primary | ICD-10-CM

## 2025-07-11 DIAGNOSIS — M54.2 CERVICAL PAIN (NECK): ICD-10-CM

## 2025-07-11 PROCEDURE — 97112 NEUROMUSCULAR REEDUCATION: CPT

## 2025-07-11 PROCEDURE — 97110 THERAPEUTIC EXERCISES: CPT

## 2025-07-11 PROCEDURE — 97140 MANUAL THERAPY 1/> REGIONS: CPT

## 2025-07-11 NOTE — PROGRESS NOTES
Daily Note     Today's date: 2025  Patient name: Catrachita Poole  : 1970  MRN: [id number]  Referring provider: Catrachita Leblanc CR*  Dx:   Encounter Diagnosis     ICD-10-CM    1. Lumbar back pain  M54.50       2. Cervical pain (neck)  M54.2       3. Acute pain of left knee  M25.562           Start Time: 1300  Stop Time: 1338  Total time in clinic (min): 38 minutes    Subjective: Patient states that her neck is sore today, but she suspects it is due to returning back to work. Patient also states that her knee has been okay, but her back continues to be symptomatic.       Objective: See treatment diary below      Assessment: Tolerated treatment fair. Patient exhibited good technique with therapeutic exercises and would benefit from continued PT. Patient responded well to SOR with no increase in cervical sx. Patient's LBP limits exercise progressions for L knee. Patient seen 1:1 with PTs for entirety of session.         Plan: Continue per plan of care.      Precautions: DM, hx of cervical fusion     POC expires Unit limit Auth Expiration date PT/OT + Visit Limit?   25 bomn  60pcy         Visit/Unit Tracking  AUTH Status:  Date 2025    none Used 1 2 3 4 5     Remaining             Pertinent Findings:      Test / Measure  2025   FOTO (Predicted 70) knee 53   FOTO lumbar (predicted 68) 56           Access Code: [email or web address]  URL: [email or web address]  Date: 2025  Prepared by: Angela Yusuf    Exercises  - Supine Posterior Pelvic Tilt  - 1 x daily - 7 x weekly - 3 sets - 10 reps  - Supine Bridge  - 1 x daily - 7 x weekly - 3 sets - 10 reps  - Bridge with Heels on Swiss Ball  - 1 x daily - 7 x weekly - 3 sets - 10 reps  - Supine Active Straight Leg Raise  - 1 x daily - 7 x weekly - 3 sets - 10 reps  - Standing Hip Flexion AROM  - 1 x daily - 7 x weekly - 3 sets - 10 reps  - Seated Pelvic Tilt  - 1 x daily - 7 x weekly - 3 sets - 10 reps  - Standing  "Posterior Pelvic Tilt  - 1 x daily - 7 x weekly - 3 sets - 10 reps  - Standing Shoulder Row with Anchored Resistance  - 1 x daily - 7 x weekly - 3 sets - 10 reps  - Standing Shoulder Single Arm PNF D2 Extension with Anchored Resistance  - 1 x daily - 7 x weekly - 3 sets - 10 reps  - Supine March with Alternating Leg Lifts  - 1 x daily - 7 x weekly - 3 sets - 10 reps    Manuals 6/26 6/27 7/1 7/9 7/11        BLE distraction   SW           PA LB mobs   Gr II            SOR   [facility]                       Neuro Re-Ed             HEP/POC education              Lifting mechanics              Ergonomic education  [facility]         Postural education              LTR    X10 each  X10         Pelvic tilts  Sitting, standing& supine x5 3\"  3\"   2x10   X10 3\"  X10 3\"         Snag    Cervical extension and rotation           Child pose      X10 hold for 3\"         Ther Ex             Paloff  6# x5 each             D1 extension   6# x10 each             SLR Supine and standing             Bridge X10             Hams bridge  X10    X10  X10         Rows  X10 10#            DKTC   X10  X10  X10  X10         NS 5' lvl 6  5' RB    5' NS  5' NS 5' NS        LSU  4\" 3x5 each            LAQ  7.5#  2x10            SL hip abd  4# 2x10            Clamshells   SL BTB 2x10 each  Supine GTB 2x10           Heel slides              Leg press   70# 2x10     55# 2x10        STS  15# 2x10            Ther Activity                                       Gait Training                                       Modalities             Cold pack   Cervical 10'  Cervical 10'  Cervical 10'  Cervical 10'                                 " complains of pain/discomfort

## 2025-07-15 ENCOUNTER — APPOINTMENT (OUTPATIENT)
Dept: PHYSICAL THERAPY | Facility: REHABILITATION | Age: 55
End: 2025-07-15
Attending: NURSE PRACTITIONER
Payer: COMMERCIAL

## 2025-07-16 DIAGNOSIS — G47.00 INSOMNIA, UNSPECIFIED TYPE: ICD-10-CM

## 2025-07-17 RX ORDER — ZOLPIDEM TARTRATE 12.5 MG/1
12.5 TABLET, FILM COATED, EXTENDED RELEASE ORAL
Qty: 30 TABLET | Refills: 0 | Status: SHIPPED | OUTPATIENT
Start: 2025-07-17

## 2025-07-18 ENCOUNTER — APPOINTMENT (OUTPATIENT)
Dept: PHYSICAL THERAPY | Facility: REHABILITATION | Age: 55
End: 2025-07-18
Attending: NURSE PRACTITIONER
Payer: COMMERCIAL

## 2025-07-31 DIAGNOSIS — E66.811 OBESITY, CLASS I, BMI 30-34.9: ICD-10-CM

## 2025-08-18 DIAGNOSIS — G47.00 INSOMNIA, UNSPECIFIED TYPE: ICD-10-CM

## 2025-08-18 RX ORDER — ZOLPIDEM TARTRATE 12.5 MG/1
12.5 TABLET, FILM COATED, EXTENDED RELEASE ORAL
Qty: 30 TABLET | Refills: 0 | Status: SHIPPED | OUTPATIENT
Start: 2025-08-18

## (undated) DEVICE — GLOVE INDICATOR PI UNDERGLOVE SZ 8 BLUE

## (undated) DEVICE — STRL COTTON TIP APPLCTR 6IN PK: Brand: CARDINAL HEALTH

## (undated) DEVICE — STERILE 8 INCH PROCTO SWAB: Brand: CARDINAL HEALTH

## (undated) DEVICE — Device

## (undated) DEVICE — GLOVE SRG BIOGEL ECLIPSE 7.5

## (undated) DEVICE — REM POLYHESIVE ADULT PATIENT RETURN ELECTRODE: Brand: VALLEYLAB

## (undated) DEVICE — X-RAY DETECTABLE SPONGES,16 PLY: Brand: VISTEC

## (undated) DEVICE — BLADE BEAVER CERVICAL BIOPSY

## (undated) DEVICE — PVC URETHRAL CATHETER: Brand: DOVER

## (undated) DEVICE — SUT VICRYL 0 CT-1 27 IN J260H

## (undated) DEVICE — 3000CC GUARDIAN II: Brand: GUARDIAN

## (undated) DEVICE — STANDARD SURGICAL GOWN, L: Brand: CONVERTORS

## (undated) DEVICE — 2000CC GUARDIAN II: Brand: GUARDIAN

## (undated) DEVICE — MEDI-VAC YANK SUCT HNDL W/TPRD BULBOUS TIP: Brand: CARDINAL HEALTH